# Patient Record
Sex: FEMALE | Race: ASIAN | NOT HISPANIC OR LATINO | Employment: UNEMPLOYED | URBAN - METROPOLITAN AREA
[De-identification: names, ages, dates, MRNs, and addresses within clinical notes are randomized per-mention and may not be internally consistent; named-entity substitution may affect disease eponyms.]

---

## 2020-04-01 ENCOUNTER — COMMUNICATION - HEALTHEAST (OUTPATIENT)
Dept: SCHEDULING | Facility: CLINIC | Age: 60
End: 2020-04-01

## 2021-03-12 ENCOUNTER — TRANSFERRED RECORDS (OUTPATIENT)
Dept: HEALTH INFORMATION MANAGEMENT | Facility: CLINIC | Age: 61
End: 2021-03-12

## 2021-03-18 ENCOUNTER — TRANSFERRED RECORDS (OUTPATIENT)
Dept: HEALTH INFORMATION MANAGEMENT | Facility: CLINIC | Age: 61
End: 2021-03-18

## 2021-03-20 ENCOUNTER — COMMUNICATION - HEALTHEAST (OUTPATIENT)
Dept: SCHEDULING | Facility: CLINIC | Age: 61
End: 2021-03-20

## 2021-03-23 LAB
ALT SERPL-CCNC: 24 U/L (ref 0–45)
AST SERPL-CCNC: 50 U/L (ref 0–40)

## 2021-03-25 ENCOUNTER — MEDICAL CORRESPONDENCE (OUTPATIENT)
Dept: HEALTH INFORMATION MANAGEMENT | Facility: CLINIC | Age: 61
End: 2021-03-25

## 2021-03-26 ENCOUNTER — TRANSFERRED RECORDS (OUTPATIENT)
Dept: HEALTH INFORMATION MANAGEMENT | Facility: CLINIC | Age: 61
End: 2021-03-26

## 2021-03-26 ENCOUNTER — HOSPITAL ENCOUNTER (INPATIENT)
Facility: CLINIC | Age: 61
LOS: 2 days | Discharge: ANOTHER HOSPITAL | DRG: 557 | End: 2021-03-28
Attending: PHYSICAL MEDICINE & REHABILITATION | Admitting: PHYSICAL MEDICINE & REHABILITATION
Payer: COMMERCIAL

## 2021-03-26 DIAGNOSIS — A41.9 SEPSIS, DUE TO UNSPECIFIED ORGANISM, UNSPECIFIED WHETHER ACUTE ORGAN DYSFUNCTION PRESENT (H): ICD-10-CM

## 2021-03-26 DIAGNOSIS — R53.81 DEBILITY: Primary | ICD-10-CM

## 2021-03-26 LAB
CREAT SERPL-MCNC: 3.87 MG/DL (ref 0.6–1.1)
GFR SERPL CREATININE-BSD FRML MDRD: 12 ML/MIN/1.73M2
GLUCOSE BLDC GLUCOMTR-MCNC: 144 MG/DL (ref 70–99)
GLUCOSE BLDC GLUCOMTR-MCNC: 149 MG/DL (ref 70–99)
GLUCOSE BLDC GLUCOMTR-MCNC: 155 MG/DL (ref 70–99)
GLUCOSE SERPL-MCNC: 103 MG/DL (ref 70–125)
GLUCOSE SERPL-MCNC: 170 MG/DL (ref 70–139)
POTASSIUM SERPL-SCNC: 3.6 MMOL/L (ref 3.5–5)

## 2021-03-26 PROCEDURE — 250N000013 HC RX MED GY IP 250 OP 250 PS 637: Performed by: PHYSICAL MEDICINE & REHABILITATION

## 2021-03-26 PROCEDURE — 250N000012 HC RX MED GY IP 250 OP 636 PS 637: Performed by: PHYSICIAN ASSISTANT

## 2021-03-26 PROCEDURE — 250N000009 HC RX 250: Performed by: PHYSICIAN ASSISTANT

## 2021-03-26 PROCEDURE — 128N000003 HC R&B REHAB

## 2021-03-26 PROCEDURE — 93010 ELECTROCARDIOGRAM REPORT: CPT | Performed by: INTERNAL MEDICINE

## 2021-03-26 PROCEDURE — 93005 ELECTROCARDIOGRAM TRACING: CPT

## 2021-03-26 PROCEDURE — 250N000013 HC RX MED GY IP 250 OP 250 PS 637: Performed by: PHYSICIAN ASSISTANT

## 2021-03-26 PROCEDURE — 999N001017 HC STATISTIC GLUCOSE BY METER IP

## 2021-03-26 PROCEDURE — 99223 1ST HOSP IP/OBS HIGH 75: CPT | Performed by: PHYSICAL MEDICINE & REHABILITATION

## 2021-03-26 RX ORDER — SODIUM BICARBONATE 650 MG/1
650 TABLET ORAL 2 TIMES DAILY
Status: ON HOLD | COMMUNITY
End: 2021-04-07

## 2021-03-26 RX ORDER — SENNOSIDES A AND B 8.6 MG/1
1 TABLET, FILM COATED ORAL DAILY
Status: ON HOLD | COMMUNITY
End: 2021-03-28

## 2021-03-26 RX ORDER — ACETAMINOPHEN 325 MG/1
325-650 TABLET ORAL EVERY 4 HOURS PRN
Status: ON HOLD | COMMUNITY
End: 2021-03-28

## 2021-03-26 RX ORDER — POLYETHYLENE GLYCOL 3350 17 G/17G
17 POWDER, FOR SOLUTION ORAL DAILY
Status: DISCONTINUED | OUTPATIENT
Start: 2021-03-27 | End: 2021-03-28 | Stop reason: HOSPADM

## 2021-03-26 RX ORDER — SCOLOPAMINE TRANSDERMAL SYSTEM 1 MG/1
1 PATCH, EXTENDED RELEASE TRANSDERMAL
Status: DISCONTINUED | OUTPATIENT
Start: 2021-03-26 | End: 2021-03-28

## 2021-03-26 RX ORDER — LANOLIN ALCOHOL/MO/W.PET/CERES
3 CREAM (GRAM) TOPICAL
Status: DISCONTINUED | OUTPATIENT
Start: 2021-03-26 | End: 2021-03-28 | Stop reason: HOSPADM

## 2021-03-26 RX ORDER — BISACODYL 10 MG
10 SUPPOSITORY, RECTAL RECTAL DAILY PRN
Status: DISCONTINUED | OUTPATIENT
Start: 2021-03-26 | End: 2021-03-28 | Stop reason: HOSPADM

## 2021-03-26 RX ORDER — PROCHLORPERAZINE MALEATE 5 MG
5 TABLET ORAL EVERY 8 HOURS PRN
Status: DISCONTINUED | OUTPATIENT
Start: 2021-03-26 | End: 2021-03-28 | Stop reason: HOSPADM

## 2021-03-26 RX ORDER — POLYETHYLENE GLYCOL 3350 17 G/17G
17 POWDER, FOR SOLUTION ORAL DAILY PRN
Status: DISCONTINUED | OUTPATIENT
Start: 2021-03-26 | End: 2021-03-28 | Stop reason: HOSPADM

## 2021-03-26 RX ORDER — FERROUS SULFATE 325(65) MG
325 TABLET ORAL
Status: ON HOLD | COMMUNITY
End: 2021-03-28

## 2021-03-26 RX ORDER — POLYETHYLENE GLYCOL 3350 17 G/17G
1 POWDER, FOR SOLUTION ORAL DAILY
Status: ON HOLD | COMMUNITY
End: 2021-03-28

## 2021-03-26 RX ORDER — MICONAZOLE NITRATE 20 MG/G
CREAM TOPICAL 2 TIMES DAILY
Status: ON HOLD | COMMUNITY
End: 2021-03-28

## 2021-03-26 RX ORDER — CALCIUM POLYCARBOPHIL 625 MG 625 MG/1
625 TABLET ORAL 2 TIMES DAILY
Status: DISCONTINUED | OUTPATIENT
Start: 2021-03-26 | End: 2021-03-28 | Stop reason: HOSPADM

## 2021-03-26 RX ORDER — SODIUM BICARBONATE 650 MG/1
650 TABLET ORAL 2 TIMES DAILY
Status: DISCONTINUED | OUTPATIENT
Start: 2021-03-26 | End: 2021-03-28 | Stop reason: HOSPADM

## 2021-03-26 RX ORDER — CALCIUM POLYCARBOPHIL 625 MG 625 MG/1
2 TABLET ORAL DAILY
COMMUNITY

## 2021-03-26 RX ORDER — ASPIRIN 81 MG/1
81 TABLET ORAL DAILY
Status: DISCONTINUED | OUTPATIENT
Start: 2021-03-27 | End: 2021-03-28 | Stop reason: HOSPADM

## 2021-03-26 RX ORDER — DEXTROSE MONOHYDRATE 25 G/50ML
25-50 INJECTION, SOLUTION INTRAVENOUS
Status: DISCONTINUED | OUTPATIENT
Start: 2021-03-26 | End: 2021-03-28 | Stop reason: HOSPADM

## 2021-03-26 RX ORDER — FERROUS SULFATE 325(65) MG
325 TABLET ORAL DAILY
Status: DISCONTINUED | OUTPATIENT
Start: 2021-03-27 | End: 2021-03-28

## 2021-03-26 RX ORDER — MULTIVITAMIN,THERAPEUTIC
1 TABLET ORAL DAILY
Status: ON HOLD | COMMUNITY
End: 2021-03-28

## 2021-03-26 RX ORDER — ASPIRIN 81 MG/1
81 TABLET, CHEWABLE ORAL DAILY
COMMUNITY

## 2021-03-26 RX ORDER — POTASSIUM CHLORIDE 750 MG/1
10 TABLET, EXTENDED RELEASE ORAL DAILY
Status: DISCONTINUED | OUTPATIENT
Start: 2021-03-27 | End: 2021-03-26

## 2021-03-26 RX ORDER — METOPROLOL TARTRATE 25 MG/1
12.5 TABLET, FILM COATED ORAL 2 TIMES DAILY
Status: ON HOLD | COMMUNITY
End: 2021-04-07

## 2021-03-26 RX ORDER — NICOTINE POLACRILEX 4 MG
15-30 LOZENGE BUCCAL
Status: DISCONTINUED | OUTPATIENT
Start: 2021-03-26 | End: 2021-03-28 | Stop reason: HOSPADM

## 2021-03-26 RX ORDER — VIT B COMP NO.3/FOLIC/C/BIOTIN 1 MG-60 MG
1 TABLET ORAL DAILY
Status: ON HOLD | COMMUNITY
End: 2021-04-07

## 2021-03-26 RX ORDER — OMEPRAZOLE 40 MG/1
40 CAPSULE, DELAYED RELEASE ORAL 2 TIMES DAILY
Status: ON HOLD | COMMUNITY
End: 2021-04-07

## 2021-03-26 RX ORDER — MAGNESIUM OXIDE 400 MG/1
400 TABLET ORAL 2 TIMES DAILY
Status: ON HOLD | COMMUNITY
End: 2021-04-07

## 2021-03-26 RX ORDER — VIT B COMP NO.3/FOLIC/C/BIOTIN 1 MG-60 MG
1 TABLET ORAL DAILY
Status: DISCONTINUED | OUTPATIENT
Start: 2021-03-27 | End: 2021-03-28 | Stop reason: HOSPADM

## 2021-03-26 RX ORDER — ACETAMINOPHEN 325 MG/1
650 TABLET ORAL EVERY 6 HOURS PRN
Status: DISCONTINUED | OUTPATIENT
Start: 2021-03-26 | End: 2021-03-28 | Stop reason: HOSPADM

## 2021-03-26 RX ORDER — MAGNESIUM OXIDE 400 MG/1
400 TABLET ORAL 2 TIMES DAILY
Status: DISCONTINUED | OUTPATIENT
Start: 2021-03-26 | End: 2021-03-28 | Stop reason: HOSPADM

## 2021-03-26 RX ORDER — AMOXICILLIN 250 MG
1 CAPSULE ORAL AT BEDTIME
Status: DISCONTINUED | OUTPATIENT
Start: 2021-03-26 | End: 2021-03-28 | Stop reason: HOSPADM

## 2021-03-26 RX ADMIN — SCOPALAMINE 1 PATCH: 1 PATCH, EXTENDED RELEASE TRANSDERMAL at 17:06

## 2021-03-26 RX ADMIN — CALCIUM POLYCARBOPHIL 625 MG: 625 TABLET, FILM COATED ORAL at 20:53

## 2021-03-26 RX ADMIN — SODIUM BICARBONATE 650 MG TABLET 650 MG: at 20:53

## 2021-03-26 RX ADMIN — TICAGRELOR 90 MG: 90 TABLET ORAL at 20:53

## 2021-03-26 RX ADMIN — INSULIN ASPART 1 UNITS: 100 INJECTION, SOLUTION INTRAVENOUS; SUBCUTANEOUS at 17:57

## 2021-03-26 RX ADMIN — MAGNESIUM OXIDE TAB 400 MG (241.3 MG ELEMENTAL MG) 400 MG: 400 (241.3 MG) TAB at 20:53

## 2021-03-26 RX ADMIN — OMEPRAZOLE 20 MG: 20 CAPSULE, DELAYED RELEASE ORAL at 16:17

## 2021-03-26 RX ADMIN — Medication 12.5 MG: at 20:53

## 2021-03-26 ASSESSMENT — MIFFLIN-ST. JEOR: SCORE: 1025.38

## 2021-03-26 NOTE — H&P
[facility]   Acute Rehabilitation Unit  Admission History and Physical    CHIEF COMPLAINT   Generalized weakness.    HISTORY OF PRESENT ILLNESS  Breana Boudreaux is a 61 year old Hmong speaking woman with past medical history including but not limited to: diabetes mellitus type II, chronic kidney disease stage III, RLS, and coronary artery disease with PCI to LAD 1/26/21 and presented to ED 2/1 with NSTEMI s/p coronary angiogram with 3 stents placed on 2/3/21. She was discharged home  2/4/21 on aspirin, brilinta and crestor 40 mg daily.      Patient presented to [facility] on 2/21/21 with dyspnea. Workup revealed acute kidney injury, lactic acidosis, transaminitis and rhabdomyolysis. There was initial concern for septic shock and patient was initially on empiric antibiotics but this was ruled out and antibiotics were stopped. Patient's acute kidney injury felt to be secondary to rhabdomyolysis that appeared to have been caused by statin. Patient was emergently started on hemodialysis for acute kidney injury and severe hyperkalemia. Patient remained on  hemodialysis during hospital stay. Patient's lactic acidosis felt to be secondary to acute kidney injury and metformin.     Patient also was found to have gastrointestinal bleeding. EGD 2/22/21 was unremarkable but patient did have blood and clot seen on flexible sigmoidoscopy 2/22/21, underwent tagged RBC scan however,  no site of bleeding was identified. May received PRBC and   platelets for thrombocytopenia; felt to be secondary to  critical illness, antibiotics, and improved.  Patient was transferred to LTAC on 3/12/21     During LTAC stay patient  completed a course of fluconazole for fungal urinary tract infection. Patient continued to have nausea and vomiting felt to be multifactorial. She last underwent dialysis 3/13/21.  Creatinine remains elevated but is slowly falling. Rhabdomyolysis has resolved. Blood  pressure medications were titrated due to hypotension.  Both magnesium and potassium were replaced, with recommendation for follow up BMP and mag levels on 3/29/21.     During acute hospitalization, patient was seen and evaluated by PT and OT,  who collectively recommended that patient would benefit from ongoing therapies in the acute inpatient rehabilitation setting.  Noted to have impaired strength, impaired balance, and impaired activity tolerance.   In review of the therapy notes she is currently set up assist with grooming, mod-max assist with dressing, cga-min assist with transfers, ambulating up to 40 feet with cga.       She was seen resting in bed visit completed with [facility] [place] .  Reports generalized weakness, and ongoing nausea/ vomiting.  Reports this is ongoing since this hospitalization, felt to be related to renal failure though this is improved she feels nausea/vomiting has not improved.  No ab pain, no fevers, no dysuria, no sob, also denies dizziness.  She is hopeful intake and appetite will improve is motivated to improve independence and return home.        PAST MEDICAL HISTORY   Reviewed and updated in Epic.  No past medical history on file.  NSTEMI  CAD  Chronic back pain  RLS- previously on requip  DM type 2  HTN.    SURGICAL HISTORY  Reviewed and updated in Epic.  No past surgical history on file.    SOCIAL HISTORY  Reviewed and updated in Epic.  Marital Status: marred  Living situation: lives with spouse 1 cathy  Family support: supportive spouse and children  Vocational History: not working  Tobacco use: none  Alcohol use: none  Illicit drug use: none  Social History     Socioeconomic History     Marital status: Not on file     Spouse name: Not on file     Number of children: Not on file     Years of education: Not on file     Highest education level: Not on file   Occupational History     Not on file   Social Needs     Financial resource strain: Not on file     Food insecurity      "Worry: Not on file     Inability: Not on file     Transportation needs     Medical: Not on file     Non-medical: Not on file   Tobacco Use     Smoking status: Not on file   Substance and Sexual Activity     Alcohol use: Not on file     Drug use: Not on file     Sexual activity: Not on file   Lifestyle     Physical activity     Days per week: Not on file     Minutes per session: Not on file     Stress: Not on file   Relationships     Social connections     Talks on phone: Not on file     Gets together: Not on file     Attends Scientologist service: Not on file     Active member of club or organization: Not on file     Attends meetings of clubs or organizations: Not on file     Relationship status: Not on file     Intimate partner violence     Fear of current or ex partner: Not on file     Emotionally abused: Not on file     Physically abused: Not on file     Forced sexual activity: Not on file   Other Topics Concern     Not on file   Social History Narrative     Not on file       FAMILY HISTORY  Reviewed and updated in Epic.  No family history on file.      PRIOR FUNCTIONAL HISTORY   Pt was independent with all ADLs/IADLs, transfers, mobility and gait.      MEDICATIONS  Scheduled meds  No medications prior to admission.       ALLERGIES   Not on File      REVIEW OF SYSTEMS  A 10 point ROS was performed and negative unless otherwise noted in HPI.       PHYSICAL EXAM  VITAL SIGNS:  BP 98/62   Pulse 81   Temp 96.5  F (35.8  C) (Oral)   Resp 18   Ht 1.499 m (4' 11\")   Wt 55.5 kg (122 lb 4.8 oz)   SpO2 98%   BMI 24.70 kg/m    BMI:  There is no height or weight on file to calculate BMI.     General: awake alert   HEENT: mmm eomi  Pulmonary: non labored clear on room air  Cardiovascular: rrr  Abdominal: soft non tender non distended positive bowel sounds   Extremities: warm, well perfused, no edema in bilateral lower extremities, no tenderness in calves   MSK/neuro:   Mental Status:  alert and oriented   Cranial Nerves: " grossly normal    Sensory: Normal to light touch in bilateral upper and lower extremities    Strength: 4/5 in all muscle groups of the upper and lower extremities  Weakest proximally hf 4-/5   Reflexes: Present and symmetrical    Babinski reflex: downgoing bilaterally    Abnormal movements: None   Speech:per - clear coherent   Cognition:overall linear logical- some repetition needed for commands/ clarification   Gait: not tested  Skin: visualized skin intact.       LABS              Hgb A1C 9.2 2/21.     IMPRESSION/PLAN:  Breana Boudreaux is a 61 year old woman with CAD s/p recent stenting 2/3/21, DM type 2, HTN, RLS, who presented 2/21/21 with shortness of breath found to be in SHYANNE with lactic acidosis, rhabdomyolysis, with other electrolyte abnormalities was started on dialysis, hospital stay complicated by GI bleed, UTI, thrombocytopenia,  discharged to LTAC 3/12 last dialysis 3/13. Admitted to acute rehab 3/26/21.       Admission to acute inpatient rehab rhabdomyolysis  Impairment group code: 03.8      1. PT, OT 90 minutes of each on a daily basis, in addition to rehab nursing and close management of physiatrist.      2. Impairment of ADL's: Noted to have impaired strength, impaired activity tolerance, leading to decreased ability to independently complete ADL's.  Will benefit from ongoing OT with goal for MOD I with basic ADLs.     3. Impairment of mobility:  Noted to have impaired strength, impaired activity tolerance, and impaired balance leading to decreased mobility.  Will benefit from ongoing PT with goal for CHERYL with basic mobility.       4. Medical Conditions  Acute Kidney Injury  Lactic acidosis-   Rhabdomyolysis  Presented 2/23/21 with SHYANNE, lactic acidosis, rhabdo CK 80,000 requiring HD felt to be secondary to statin. Last HD 3/13  -trend weights, intake/output  -trend BMP 3/27, 3/29- restart potassium supplementation as indicated this was discontinued per LTAC provider.   -continue to  hold statin  -continue to hold metformin  -started on sodium bicarb 650 bid 3/26 per discharging facility    CAD- s/p PCI 2/3/21 on brilinta and asa.  -continue dual antiplatelet therapy  -metoprolol 12.5 mg bid dose reduced 3/26    HTN- with hypotension during LTAC stay with metoprolol dose reduced 3/26.   -monitor BP  -continue metoprolol 12.5 mg bid    Nausea/vomiting- reports ongoing since hospitalization 2/21 told was in setting of renal failure though has not improved per her report. Last QTc 3/16 486.    -ppi as above  -encourage intake  -prn compazine    QTc- mildly prolonged 486 3/16  -routine EKG eval Qtc-     Hypomagnesemia- continue mag oxide  -repeat Mag level 3/29    Recent GI bleed- with edg and flex sig 2/22/21 with resolution.   -monitor  -continue ppi bid    Anemia- in setting of critical illness, renal failure.  Hgb 8.4 3/25  -continue iron supplement daily  -trend hgb    Leucocytosis-  WBC 12.2 3/23  -trend 3/29  -monitor for signs/symptoms of infection    DM type 2- Hgb A1c 2/21/21 9.2% previously on metformin, lantus, metformin held in setting of recent lactic acidosis. bg overall stable on ssi, though intake reported as poor due to nausea/ vomiting  -check bg qid  -ssi- will taper off or add long acting pending bg levels during rehab stay.       5. Adjustment to disability:  Clinical psychology to eval and treat as indicated  6. FEN: regular- cardiac- though appetite poor and n/v  7. Bowel: reports loose- monitor hold bowel meds  8. Bladder: monitor as recovering renal failure  9. DVT Prophylaxis: mechanical  10. GI Prophylaxis: ppi bid  11. Code: full  12. Disposition: home  13. ELOS:  ~10 days.  14. Rehab prognosis:  good  15. Follow up Appointments on Discharge: pcp, cardiology,      discussed with Dr. Nieves , PM&R staff physician     Ivett Constantino PA-C  Rehab Service

## 2021-03-26 NOTE — PLAN OF CARE
FOCUS/GOAL  Medical management    ASSESSMENT, INTERVENTIONS AND CONTINUING PLAN FOR GOAL:  Patient arrived around 13:00 with EMTs. She transferred from  to bed  with CGA of 1 using gait belt only per report.  Patient is a Hmong speaker,  services are not available on the Ipad. Need to call them via phone. Per language services.  Patient  will  Need help to order meals. Today's lunch and dinner and tomorrow breakfast ordered. ordered.  Diabetic, BG was 149.  sliding scale not received, insulin not available and  Patient ate. Vitals taken, BP was 98/52. Skin is intact, blanchable redness noted on coccyx area.T/R every 2h, patient is able to independently change position ( reminder may be needed).  Picc line double lumen on R upper chest. Room orientation done, patient stated her goals. Reported  LBM 3/26.   Patient c/o of nausea at arrival, admitting MD informed. Will continue with POC.

## 2021-03-27 ENCOUNTER — APPOINTMENT (OUTPATIENT)
Dept: GENERAL RADIOLOGY | Facility: CLINIC | Age: 61
DRG: 557 | End: 2021-03-27
Attending: PHYSICAL MEDICINE & REHABILITATION
Payer: COMMERCIAL

## 2021-03-27 LAB
ANION GAP SERPL CALCULATED.3IONS-SCNC: 11 MMOL/L (ref 3–14)
BASOPHILS # BLD AUTO: 0.1 10E9/L (ref 0–0.2)
BASOPHILS NFR BLD AUTO: 0.9 %
BUN SERPL-MCNC: 28 MG/DL (ref 7–30)
CALCIUM SERPL-MCNC: 8.2 MG/DL (ref 8.5–10.1)
CHLORIDE SERPL-SCNC: 107 MMOL/L (ref 94–109)
CO2 SERPL-SCNC: 21 MMOL/L (ref 20–32)
CREAT SERPL-MCNC: 3.67 MG/DL (ref 0.52–1.04)
DIFFERENTIAL METHOD BLD: ABNORMAL
EOSINOPHIL # BLD AUTO: 0.4 10E9/L (ref 0–0.7)
EOSINOPHIL NFR BLD AUTO: 4.2 %
ERYTHROCYTE [DISTWIDTH] IN BLOOD BY AUTOMATED COUNT: 19.5 % (ref 10–15)
GFR SERPL CREATININE-BSD FRML MDRD: 13 ML/MIN/{1.73_M2}
GLUCOSE BLDC GLUCOMTR-MCNC: 103 MG/DL (ref 70–99)
GLUCOSE BLDC GLUCOMTR-MCNC: 126 MG/DL (ref 70–99)
GLUCOSE BLDC GLUCOMTR-MCNC: 146 MG/DL (ref 70–99)
GLUCOSE BLDC GLUCOMTR-MCNC: 149 MG/DL (ref 70–99)
GLUCOSE SERPL-MCNC: 108 MG/DL (ref 70–99)
HCT VFR BLD AUTO: 30.5 % (ref 35–47)
HGB BLD-MCNC: 9.6 G/DL (ref 11.7–15.7)
IMM GRANULOCYTES # BLD: 0 10E9/L (ref 0–0.4)
IMM GRANULOCYTES NFR BLD: 0.3 %
LYMPHOCYTES # BLD AUTO: 3.8 10E9/L (ref 0.8–5.3)
LYMPHOCYTES NFR BLD AUTO: 37.2 %
MAGNESIUM SERPL-MCNC: 2 MG/DL (ref 1.6–2.3)
MCH RBC QN AUTO: 30.8 PG (ref 26.5–33)
MCHC RBC AUTO-ENTMCNC: 31.5 G/DL (ref 31.5–36.5)
MCV RBC AUTO: 98 FL (ref 78–100)
MONOCYTES # BLD AUTO: 0.9 10E9/L (ref 0–1.3)
MONOCYTES NFR BLD AUTO: 8.9 %
NEUTROPHILS # BLD AUTO: 5 10E9/L (ref 1.6–8.3)
NEUTROPHILS NFR BLD AUTO: 48.5 %
NRBC # BLD AUTO: 0 10*3/UL
NRBC BLD AUTO-RTO: 0 /100
PLATELET # BLD AUTO: 382 10E9/L (ref 150–450)
POTASSIUM SERPL-SCNC: 3.6 MMOL/L (ref 3.4–5.3)
RBC # BLD AUTO: 3.12 10E12/L (ref 3.8–5.2)
SODIUM SERPL-SCNC: 139 MMOL/L (ref 133–144)
WBC # BLD AUTO: 10.2 10E9/L (ref 4–11)

## 2021-03-27 PROCEDURE — 97116 GAIT TRAINING THERAPY: CPT | Mod: GP

## 2021-03-27 PROCEDURE — 97165 OT EVAL LOW COMPLEX 30 MIN: CPT | Mod: GO | Performed by: STUDENT IN AN ORGANIZED HEALTH CARE EDUCATION/TRAINING PROGRAM

## 2021-03-27 PROCEDURE — 250N000013 HC RX MED GY IP 250 OP 250 PS 637: Performed by: PHYSICAL MEDICINE & REHABILITATION

## 2021-03-27 PROCEDURE — 85025 COMPLETE CBC W/AUTO DIFF WBC: CPT | Performed by: PHYSICIAN ASSISTANT

## 2021-03-27 PROCEDURE — 97162 PT EVAL MOD COMPLEX 30 MIN: CPT | Mod: GP

## 2021-03-27 PROCEDURE — 36415 COLL VENOUS BLD VENIPUNCTURE: CPT | Performed by: PHYSICIAN ASSISTANT

## 2021-03-27 PROCEDURE — 97535 SELF CARE MNGMENT TRAINING: CPT | Mod: GO | Performed by: STUDENT IN AN ORGANIZED HEALTH CARE EDUCATION/TRAINING PROGRAM

## 2021-03-27 PROCEDURE — 94640 AIRWAY INHALATION TREATMENT: CPT

## 2021-03-27 PROCEDURE — 83735 ASSAY OF MAGNESIUM: CPT | Performed by: PHYSICIAN ASSISTANT

## 2021-03-27 PROCEDURE — 999N000157 HC STATISTIC RCP TIME EA 10 MIN

## 2021-03-27 PROCEDURE — 71046 X-RAY EXAM CHEST 2 VIEWS: CPT | Mod: 26 | Performed by: RADIOLOGY

## 2021-03-27 PROCEDURE — 97530 THERAPEUTIC ACTIVITIES: CPT | Mod: GP

## 2021-03-27 PROCEDURE — 250N000011 HC RX IP 250 OP 636: Performed by: STUDENT IN AN ORGANIZED HEALTH CARE EDUCATION/TRAINING PROGRAM

## 2021-03-27 PROCEDURE — 999N001017 HC STATISTIC GLUCOSE BY METER IP

## 2021-03-27 PROCEDURE — 71046 X-RAY EXAM CHEST 2 VIEWS: CPT

## 2021-03-27 PROCEDURE — 250N000009 HC RX 250: Performed by: PHYSICAL MEDICINE & REHABILITATION

## 2021-03-27 PROCEDURE — 250N000013 HC RX MED GY IP 250 OP 250 PS 637: Performed by: PHYSICIAN ASSISTANT

## 2021-03-27 PROCEDURE — 80048 BASIC METABOLIC PNL TOTAL CA: CPT | Performed by: PHYSICIAN ASSISTANT

## 2021-03-27 PROCEDURE — 99233 SBSQ HOSP IP/OBS HIGH 50: CPT | Mod: GC | Performed by: PHYSICAL MEDICINE & REHABILITATION

## 2021-03-27 PROCEDURE — 128N000003 HC R&B REHAB

## 2021-03-27 PROCEDURE — 97530 THERAPEUTIC ACTIVITIES: CPT | Mod: GO | Performed by: STUDENT IN AN ORGANIZED HEALTH CARE EDUCATION/TRAINING PROGRAM

## 2021-03-27 RX ORDER — HEPARIN SODIUM,PORCINE 10 UNIT/ML
5-10 VIAL (ML) INTRAVENOUS
Status: DISCONTINUED | OUTPATIENT
Start: 2021-03-27 | End: 2021-03-28 | Stop reason: HOSPADM

## 2021-03-27 RX ORDER — HEPARIN SODIUM,PORCINE 10 UNIT/ML
5-10 VIAL (ML) INTRAVENOUS EVERY 24 HOURS
Status: DISCONTINUED | OUTPATIENT
Start: 2021-03-27 | End: 2021-03-28 | Stop reason: HOSPADM

## 2021-03-27 RX ORDER — IPRATROPIUM BROMIDE AND ALBUTEROL SULFATE 2.5; .5 MG/3ML; MG/3ML
3 SOLUTION RESPIRATORY (INHALATION) 2 TIMES DAILY
Status: DISCONTINUED | OUTPATIENT
Start: 2021-03-27 | End: 2021-03-28

## 2021-03-27 RX ADMIN — Medication 12.5 MG: at 21:39

## 2021-03-27 RX ADMIN — TICAGRELOR 90 MG: 90 TABLET ORAL at 08:20

## 2021-03-27 RX ADMIN — PROCHLORPERAZINE MALEATE 5 MG: 5 TABLET ORAL at 15:31

## 2021-03-27 RX ADMIN — IPRATROPIUM BROMIDE AND ALBUTEROL SULFATE 3 ML: .5; 3 SOLUTION RESPIRATORY (INHALATION) at 12:53

## 2021-03-27 RX ADMIN — POLYETHYLENE GLYCOL 3350 17 G: 17 POWDER, FOR SOLUTION ORAL at 08:20

## 2021-03-27 RX ADMIN — HEPARIN, PORCINE (PF) 10 UNIT/ML INTRAVENOUS SYRINGE 10 ML: at 15:32

## 2021-03-27 RX ADMIN — TICAGRELOR 90 MG: 90 TABLET ORAL at 21:38

## 2021-03-27 RX ADMIN — Medication 1 TABLET: at 08:20

## 2021-03-27 RX ADMIN — OMEPRAZOLE 20 MG: 20 CAPSULE, DELAYED RELEASE ORAL at 06:35

## 2021-03-27 RX ADMIN — CALCIUM POLYCARBOPHIL 625 MG: 625 TABLET, FILM COATED ORAL at 08:19

## 2021-03-27 RX ADMIN — Medication 12.5 MG: at 08:19

## 2021-03-27 RX ADMIN — OMEPRAZOLE 20 MG: 20 CAPSULE, DELAYED RELEASE ORAL at 16:34

## 2021-03-27 RX ADMIN — MAGNESIUM OXIDE TAB 400 MG (241.3 MG ELEMENTAL MG) 400 MG: 400 (241.3 MG) TAB at 21:39

## 2021-03-27 RX ADMIN — DOCUSATE SODIUM 50 MG AND SENNOSIDES 8.6 MG 1 TABLET: 8.6; 5 TABLET, FILM COATED ORAL at 21:38

## 2021-03-27 RX ADMIN — CALCIUM POLYCARBOPHIL 625 MG: 625 TABLET, FILM COATED ORAL at 21:39

## 2021-03-27 RX ADMIN — SODIUM BICARBONATE 650 MG TABLET 650 MG: at 21:39

## 2021-03-27 RX ADMIN — ASPIRIN 81 MG: 81 TABLET, DELAYED RELEASE ORAL at 08:20

## 2021-03-27 RX ADMIN — SODIUM BICARBONATE 650 MG TABLET 650 MG: at 08:20

## 2021-03-27 RX ADMIN — MAGNESIUM OXIDE TAB 400 MG (241.3 MG ELEMENTAL MG) 400 MG: 400 (241.3 MG) TAB at 08:19

## 2021-03-27 RX ADMIN — FERROUS SULFATE TAB 325 MG (65 MG ELEMENTAL FE) 325 MG: 325 (65 FE) TAB at 08:20

## 2021-03-27 ASSESSMENT — MIFFLIN-ST. JEOR: SCORE: 1017.21

## 2021-03-27 NOTE — PLAN OF CARE
FOCUS/GOAL  Medical management    ASSESSMENT, INTERVENTIONS AND CONTINUING PLAN FOR GOAL:    Pt is alert and oriented. Hmong speaking but able to understand and speak simple english. Denied pain, sob and numbness/tingling. A1 with the walker and gait belt in transfers. Min A1 in bed mobility in turning side to side. Continent of bladder. PVR at 34ml. Had an inc. episode of bowel in the evening shift. LBM 3/26. Ambulates to the bathroom for toileting. Has a PICC line on the right upper chest with Cdi dresisng. Did not remove PICC line for pt has abnormal lab results. Will await further instructions. Appears sleeping during rounds.

## 2021-03-27 NOTE — PLAN OF CARE
FOCUS/GOAL  Medical management    ASSESSMENT, INTERVENTIONS AND CONTINUING PLAN FOR GOAL:  VSS. Patient c/o of SOB and cough. Vitals were still within normal range. Lungs sound clear, weight slightly down. Provider updated. New order received. CXR, labs and nebulizer done.  Received 2L of O2 for few hours per her request and doctor's verbal orders for comfort. ( O2 sat was 99%). Declined lunch, BG was 146 sliding scale not administered.  Patient stated feeling better after neb administration.  Denied pain.  Orthostatic BP done, Provider informed in person. No new order received. Received compazine at the end ;of the shift per her request,result TBD. Currently on 2L o2 per her request via NC, Provider informed. Will continue to monitor.

## 2021-03-27 NOTE — PROGRESS NOTES
03/27/21 1000   Quick Adds   Type of Visit Initial PT Evaluation   Living Environment   People in home spouse;child(daron), adult   Current Living Arrangements house   Living Environment Comments [address], one level living. Son works from home-available 24/7   Self-Care   Usual Activity Tolerance good   Current Activity Tolerance fair   Regular Exercise Yes   Activity/Exercise Type strength training;walking   Exercise Amount/Frequency 3-5 times/wk   Activity/Exercise/Self-Care Comment PLOF: I with functional mobility; SPC.    Disability/Function   Patient's preferred means of communication ;verbal   Fall history within last six months no   General Information   Onset of Illness/Injury or Date of Surgery 02/21/21   Referring Physician Shelia Nieves MD   Patient/Family Therapy Goals Statement (PT) To go home   Pertinent History of Current Problem (include personal factors and/or comorbidities that impact the POC) NSTEMI 2/1; now s/p angio with 3 stents placed and pt Dc'd home, presented back 2/21 with dyspnea; Workup revealed acute kidney injury, lactic acidosis, transaminitis and rhabdomyolysis-appeared to be caused by statin; complicated hospital stay, see H&P; LTACH stay prior to admit here.    Existing Precautions/Restrictions fall   Cognition   Orientation Status (Cognition) oriented x 4   Pain Assessment   Patient Currently in Pain No   Posture    Posture Forward head position;Protracted shoulders   Range of Motion (ROM)   ROM Comment B LE ROM WFL   Strength   Strength Comments B LE strength at least 3+/5 based on observation of functional movement, Definite functional weakness and premature fatigue.    ARC Assessment Only   Acute Rehab Functional Assessment See IP Rehab Daily Documentation Flowsheet for Functional Mobility/ADL Assessment   Balance   Balance Comments Not formally assessed, increased postural sway   Sensory Examination   Sensory Perception patient reports no sensory changes    Muscle Tone   Muscle Tone no deficits were identified   Clinical Impression   Criteria for Skilled Therapeutic Intervention yes, treatment indicated   PT Diagnosis (PT) Decreased I with funcitonal mobility   Influenced by the following impairments Decreased strength, balance and activity tolerance, possible motion sensitivity and nausea   Functional limitations due to impairments difficulty with bed mobility, transfers and gait.    Clinical Presentation Evolving/Changing   Clinical Presentation Rationale strength, balance, nausea; complicated hospital stay   Clinical Decision Making (Complexity) moderate complexity   Therapy Frequency (PT) Daily   Predicted Duration of Therapy Intervention (days/wks) 2 weeks   Planned Therapy Interventions (PT) balance training;bed mobility training;gait training;home exercise program;motor coordination training;neuromuscular re-education;patient/family education;ROM (range of motion);stair training;strengthening;transfer training;stretching   Clinical Impression Comments PT is a 61 year old female who presents to PT with decreased I with functional mobility after a prolong hospitalization related to SHYANNE and rhabdo after recent NSTEMI. Prior to admit pt was I with intermittent use of SPC. Pt now requires up to modA for functional mobility. Pt will benefit from skilled PT intervention in order to improve listed deficits.    Total Evaluation Time   Total Evaluation Time (Minutes) 20

## 2021-03-27 NOTE — PROGRESS NOTES
"  [facility]   Acute Rehabilitation Unit  Daily progress note    INTERVAL HISTORY  Breana Boudreaux was seen and examined at bedside with help of . Patient reporting symptoms of SOB especially with deep inspiration. Denies CP. No fever. No abdominal pain. Vitals stable. LBM 3/26. PVRs under 100 x2. Curbsided IM about SOB symptoms. Patient does not appear to be in distress. CXR obtained (no baldemar pulmonary edema, but re-demonstrated cardiomegaly as reported on previous CXR 3/3). Ordered DuoNebs. Labs acutally improving in terms of Cr, Hgb, and WBC. Appreciate SLP assistance with swallow eval/upper airway assessment.    Current Status:  ADLs: MAX A for LB dressing, mod A for toileting, Set up for UB cares seated. CGA for transfers using walker  IADLs:not tested  Vision/Cognition: WFL  Pt now requires up to modA for functional mobility.     MEDICATIONS  Scheduled meds    aspirin  81 mg Oral Daily     calcium polycarbophil  625 mg Oral BID     ferrous sulfate  325 mg Oral Daily     heparin lock flush  5-10 mL Intracatheter Q24H     insulin aspart  1-7 Units Subcutaneous TID AC     insulin aspart  1-5 Units Subcutaneous At Bedtime     ipratropium - albuterol 0.5 mg/2.5 mg/3 mL  3 mL Nebulization BID     magnesium oxide  400 mg Oral BID     metoprolol tartrate  12.5 mg Oral BID     multivitamin RENAL  1 tablet Oral Daily     omeprazole  20 mg Oral BID AC     polyethylene glycol  17 g Oral Daily     scopolamine  1 patch Transdermal Q72H    And     scopolamine   Transdermal Q8H     senna-docusate  1 tablet Oral At Bedtime     sodium bicarbonate  650 mg Oral BID     ticagrelor  90 mg Oral BID       PRN meds:  acetaminophen, bisacodyl, glucose **OR** dextrose **OR** glucagon, heparin lock flush, melatonin, polyethylene glycol, prochlorperazine, sodium chloride (PF)      PHYSICAL EXAM  /79   Pulse 106   Temp 97.1  F (36.2  C) (Oral)   Resp 18   Ht 1.499 m (4' 11\")   " Wt 54.7 kg (120 lb 8 oz)   SpO2 99%   BMI 24.34 kg/m    Gen: NAD, pleasant and cooperative   HEENT: NCAT, EOMI, no nystagmus, AURORA  Cardio: 2+ radail pulse, well perfused  Pulm: Dry cough, lungs clear to auscultation. Saturating well on RA.  Abd: benign  Ext: WWP, no edema in BLE, no tenderness in calves  Neuro/MSK: 4/5 in c5-t1 and l2-s1 myotomes, CN 2-12 intact, AAOx3.      LABS  Recent Results (from the past 240 hour(s))   COVID-19 VIRUS (CORONAVIRUS) BY PCR - EXTERNAL RESULT    Collection Time: 03/19/21  6:43 PM   Result Value Ref Range    COVID-19 Virus by PCR (External Result) Negative Negative, Invalid   Glucose by meter    Collection Time: 03/26/21  1:13 PM   Result Value Ref Range    Glucose 149 (H) 70 - 99 mg/dL   EKG 12-lead, complete    Collection Time: 03/26/21  3:37 PM   Result Value Ref Range    Interpretation ECG Click View Image link to view waveform and result    Glucose by meter    Collection Time: 03/26/21  4:50 PM   Result Value Ref Range    Glucose 144 (H) 70 - 99 mg/dL   Glucose by meter    Collection Time: 03/26/21  8:50 PM   Result Value Ref Range    Glucose 155 (H) 70 - 99 mg/dL   Basic metabolic panel    Collection Time: 03/27/21  5:43 AM   Result Value Ref Range    Sodium 139 133 - 144 mmol/L    Potassium 3.6 3.4 - 5.3 mmol/L    Chloride 107 94 - 109 mmol/L    Carbon Dioxide 21 20 - 32 mmol/L    Anion Gap 11 3 - 14 mmol/L    Glucose 108 (H) 70 - 99 mg/dL    Urea Nitrogen 28 7 - 30 mg/dL    Creatinine 3.67 (H) 0.52 - 1.04 mg/dL    GFR Estimate 13 (L) >60 mL/min/[1.73_m2]    GFR Estimate If Black 15 (L) >60 mL/min/[1.73_m2]    Calcium 8.2 (L) 8.5 - 10.1 mg/dL   Magnesium    Collection Time: 03/27/21  5:43 AM   Result Value Ref Range    Magnesium 2.0 1.6 - 2.3 mg/dL   CBC with platelets differential    Collection Time: 03/27/21  5:43 AM   Result Value Ref Range    WBC 10.2 4.0 - 11.0 10e9/L    RBC Count 3.12 (L) 3.8 - 5.2 10e12/L    Hemoglobin 9.6 (L) 11.7 - 15.7 g/dL    Hematocrit 30.5  (L) 35.0 - 47.0 %    MCV 98 78 - 100 fl    MCH 30.8 26.5 - 33.0 pg    MCHC 31.5 31.5 - 36.5 g/dL    RDW 19.5 (H) 10.0 - 15.0 %    Platelet Count 382 150 - 450 10e9/L    Diff Method Automated Method     % Neutrophils 48.5 %    % Lymphocytes 37.2 %    % Monocytes 8.9 %    % Eosinophils 4.2 %    % Basophils 0.9 %    % Immature Granulocytes 0.3 %    Nucleated RBCs 0 0 /100    Absolute Neutrophil 5.0 1.6 - 8.3 10e9/L    Absolute Lymphocytes 3.8 0.8 - 5.3 10e9/L    Absolute Monocytes 0.9 0.0 - 1.3 10e9/L    Absolute Eosinophils 0.4 0.0 - 0.7 10e9/L    Absolute Basophils 0.1 0.0 - 0.2 10e9/L    Abs Immature Granulocytes 0.0 0 - 0.4 10e9/L    Absolute Nucleated RBC 0.0    Glucose by meter    Collection Time: 03/27/21  7:29 AM   Result Value Ref Range    Glucose 103 (H) 70 - 99 mg/dL   Glucose by meter    Collection Time: 03/27/21 12:35 PM   Result Value Ref Range    Glucose 146 (H) 70 - 99 mg/dL   Glucose by meter    Collection Time: 03/27/21  4:57 PM   Result Value Ref Range    Glucose 149 (H) 70 - 99 mg/dL   Glucose by meter    Collection Time: 03/27/21  9:29 PM   Result Value Ref Range    Glucose 126 (H) 70 - 99 mg/dL       CXR 3/27/2021  Findings:   Right IJ central venous catheter tip in the high right atrium. Heart  enlarged. Pulmonary vasculature not engorged. Minimal bibasilar  streaky opacity suggesting atelectasis.                                                                 Impression: Cardiomegaly without overt edema. Minimal subsegmental  atelectasis both lower lobes.    CXR 3/3/2021  IMPRESSION: Right dual-lumen central venous catheter and right IJ central venous catheter tip near the atrial caval junction. Cardiac enlargement again seen. Pulmonary vascularity within normal limits. Lungs clear. No change.    ASSESSMENT AND PLAN  Breana Boudreaux is a 61 year old woman with CAD s/p recent stenting 2/3/21, DM type 2, HTN, RLS, who presented 2/21/21 with shortness of breath found to be in SHYANNE with lactic  acidosis, rhabdomyolysis, with other electrolyte abnormalities was started on dialysis, hospital stay complicated by GI bleed, UTI, thrombocytopenia,  discharged to LTAC 3/12 last dialysis 3/13. Admitted to acute rehab 3/26/21.         Admission to acute inpatient rehab rhabdomyolysis  Impairment group code: 03.8       --Vitals stable. O2 sats good on RA. Unclear etiology of SOB. CXR unrevealing except for atelectasis. IS ordered. Ordered orthostatics (negative). Lungs sound clear. Afebrile.  --Weight is down today, so in light of all other findings, will hold off on diuresis consideration. But given SHYANNE history will monitor fluid status and symptoms. May formally involve IM if breathing difficulty persists.  --DuoNeb ordered.  --SLP to evaluate swallow.  --Labs improving (Cr down, WBC down, Hgb up). Mag and K wnl.  --No nausea reported this AM.  --Continue ongoing medical management.  --Continue therapies and plan of care.    Patient seen and discussed with Dr. oMntoya, staff physician.  Silvio Gonzalez MD  PGY-4, PM&R  [facility]        I, Joslyn Montoya, saw this patient with my resident Dr. Gonzalez and agree with his findings and plan of care as documented in his note.     I personally reviewed the chart (vitals signs, medications, and labs).     My key findings and negron manegement decisions made by me:   Saw her two times with the complaint of SOB and cough. Reviewed the chart carefully and discussed her case with Dr. Khanna. Work-up has been unremarkable and the etiology is unclear. Likely upper airway vs volume overload due to CKD. Will monitor and consult hospitalist team if her symptoms remain unchanged by tomorrow.     Eval completed and ELOS is 10-14 days.       I spent a total of 35 minutes face-to-face and managing the care of the patient. Over 50% of my time on the unit was spent counseling the patient and coordinating care. See note for details.

## 2021-03-27 NOTE — PLAN OF CARE
FOCUS/GOAL  Bowel management, Bladder management, and Safety management    ASSESSMENT, INTERVENTIONS AND CONTINUING PLAN FOR GOAL:  Pt was A/O, able to use call light and make needs known to staff, needs an  at times, speaks basic English. Denies pain, SOB, chest pain nor dizziness. A1 walker with transfers. On regular, low fat diet, thin liquids, takes medicines whole. Cont of BL, incontinent of BM x 1, LBM-3/26/21, PVR-74. PICC line in place, dressing CDI. BGs monitored, VSS. Will continue with current POC.

## 2021-03-27 NOTE — PROGRESS NOTES
"   21 1024   Living Arrangements   People in home child(daron), adult;spouse   CM/SW Discharge Planning   Anticipated Discharge Disposition (CM/SW) Home;Home Care   Final Resources   Equipment Currently Used at Home grab bar, tub/shower;shower chair    Social Work: Initial Assessment with Discharge Plan    Patient Name: Breana Mota \"May\" Janusz  : 1960  Age: 61 year old  MRN: [id number]  Completed assessment with: patient,   Admitted to ARU: 3/26/21    Presenting Information   Date of SW assessment: 3/27/21  Health Care Directive: none  Primary Health Care Agent: next-of-kin would be surrogate decision-maker if necessary  Secondary Health Care Agent: n/a  Living Situation: lives with  and 2 adult sons (Amos & Chris) in house  Previous Functional Status: independent prior to surgery/hospitalization in January   Patient and family understanding of hospitalization: yes  Cultural/Language/Spiritual Considerations: Hmong-speaking (), from [facility]  Reason for admission: 61-year-old Hmong speaking woman who presents to the acute inpatient rehabilitation unit for muscle weakness due to rhabdomyolysis and SHYANNE due to statins.  She has a history of NSTEMI in January with stent placements at which time she was placed on the statin medication.  Hospital course has been complicated by GI bleeding without an acute source identified, thrombocytopenia, and the need for temporary dialysis due to her SHYANNE.  She was discharged to an LTACH on 3/12/2021 and now subsequently transferred to the acute inpatient rehabilitation unit.      Provider Information   Primary Care Physician: Edwardo Garcia    Mental Health/Chemical Dependency:   Diagnosis: no diagnosis  Alcohol/Tobacco/Narcotics: no problems  Support/Services in Place: none  Services Needed/Recommended: health psychologist available if interested during stay  Sexuality/Intimacy: hnot discussed; history of conflict in marriage " ( sees other women)    Support System  Marital Status: ; -Briana (works full-time)  Family support: son-Chris (works full-time)  Son-Amos (works from home)  Other support available: daughter-Ara    Community Resources  Current in home services: [facility]-home nurse & PT (saw several visits in Feb 2021)  Previous services: none    Financial/Employment/Education  Employment Status: disabled; unable to work most of 2020 but has been on short-term disability  Income Source: 's wages  Education: some school  Financial Concerns:  supports her financially  Insurance: [employer] Open Access    Discharge Plan   Patient and family discharge goal: return home with family & home care/therapy services  Provided education on discharge plan: familiar with home care services  Patient agreeable to discharge plan: to be determined  Provided education and attained signature for Medicare IM and IRF Patient Rights and Privacy Information provided to patient : n/a  Provided patient with [facility] Resources: n/a  Barriers to discharge: none identified    Discharge Recommendations   Disposition: return home with family & home care/therapy services  Transportation Needs: family can provide  Name of Transportation Company and Phone: n/a    Additional comments   D:  See H&P for full medical background.  I:  Met with patient to complete assessment and social work consult.  A:  Patient is doing okay, feels tired.  Supportive family who is involved.    P:  SW will follow and assist as needed.    Please invite to Care Conference:  Chris (son)  Amos (son)  Briana ()      SERGEI Garrett  Weekend   Phone: [phone]  Pager: [phone]

## 2021-03-27 NOTE — PLAN OF CARE
Problem: Rehabilitation (IRF) Plan of Care  Goal: Optimal Comfort and Wellbeing  Outcome:  Patent c/o of poor sleep, Provider informed . Expecting change in medication.  He also c/o of constipation. LBM 3/26 Patient is receiving Miralax daily. Provider informed no order received at this point.

## 2021-03-27 NOTE — PLAN OF CARE
Discharge Planner Post-Acute Rehab OT:     Discharge Plan: Home with assist and HH OT    Precautions: fall    Current Status:  ADLs: MAX A for LB dressing, mod A for toileting, Set up for UB cares seated. CGA for transfers using walker  IADLs:not tested  Vision/Cognition: WFL    Assessment: Eval completed, pt is below baseline and will benefit from skilled OT. Use commode with pt in the bathroom since the regular toilet is too high for her. Pt has poor standing tolerance due to fatigue and back pain. ELOS is 10-14 days pending progress. Pt has assist at home as needed.     Other Barriers to Discharge (DME, Family Training, etc): level of assist, DME and family training to be done before discharge.

## 2021-03-27 NOTE — PROGRESS NOTES
"   03/27/21 0635   Quick Adds   Type of Visit Initial Occupational Therapy Evaluation       Present yes   Language Hmong   Living Environment   People in home spouse;child(daron), adult   Current Living Arrangements house   Living Environment Comments 1 DAYLIN, tub with Gb and shower chair,   Self-Care   Usual Activity Tolerance good   Equipment Currently Used at Home shower chair;grab bar, tub/shower   Activity/Exercise/Self-Care Comment PLOF was IND with self cares and IADL.  Pt was in ST disability due to back pain. . Pt likes to cook and garden   Disability/Function   Change in Functional Status Since Onset of Current Illness/Injury yes   General Information   Onset of Illness/Injury or Date of Surgery 03/26/21   Referring Physician Ivett BLAIR   Patient/Family Therapy Goal Statement (OT) to usually keeps very busy and would like to keep herself busy again   Additional Occupational Profile Info/Pertinent History of Current Problem Per chart \" 61-year-old Hmong speaking woman who presents to the acute inpatient rehabilitation unit for muscle weakness due to rhabdomyolysis and SHYANNE due to statins.  She has a history of NSTEMI in January with stent placements at which time she was placed on the statin medication.  Hospital course has been complicated by GI bleeding without an acute source identified, thrombocytopenia, and the need for temporary dialysis due to her SHYANNE.\"   Existing Precautions/Restrictions fall   Limitations/Impairments other (see comments)  (endurance, strength)   Cognitive Status Examination   Cognitive Status Comments WFL   Visual Perception   Impact of Vision Impairment on Function (Vision) WFL   Pain Assessment   Patient Currently in Pain No   Range of Motion Comprehensive   Comment, General Range of Motion UE WFL   Strength Comprehensive (MMT)   Comment, General Manual Muscle Testing (MMT) Assessment Deconditioned overall    Coordination   Coordination Comments WFL   ARC " Assessment Only   Acute Rehab Functional Assessment See IP Rehab Daily Documentation Flowsheet for Functional Mobility/ADL Assessment   Clinical Impression   Criteria for Skilled Therapeutic Interventions Met (OT) yes   OT Diagnosis ADL and mobility deficit   OT Problem List-Impairments impacting ADL activity tolerance impaired;balance;mobility;strength   ADL comments/analysis ADL and mobility deficit   Assessment of Occupational Performance 5 or more Performance Deficits   Identified Performance Deficits ADL and mobility deficit   Planned Therapy Interventions (OT) ADL retraining;IADL retraining;strengthening;transfer training;progressive activity/exercise   Clinical Decision Making Complexity (OT) low complexity   Therapy Frequency (OT) Daily   Predicted Duration of Therapy 10 days   Anticipated Equipment Needs Upon Discharge (OT)   (TBD)   Risk & Benefits of therapy have been explained evaluation/treatment results reviewed;care plan/treatment goals reviewed   Comment-Clinical Impression Pt is below baseline due to weakness and decreased endurance. Pt will benefit from skille dOT to regain indepenence.    Total Evaluation Time (Minutes)   Total Evaluation Time (Minutes) 15

## 2021-03-27 NOTE — PROGRESS NOTES
Discharge Planner Post-Acute Rehab PT:     Dx: NSTEMI, SHYANNE with rhabdo    Discharge Plan: home with family assist (pt reports son [facility] and would be available 24/7)    Precautions: Fall  Hmong interpretor      Current Status:  Bed Mobility: modA supine to sit; SBA sit to supine, maxA to reposition in bed  Transfer: CGA from std height, Alona from low surface  Gait: 80' with FWW, increased trunk sway, proximal weakness, WC follow.   Stairs: 2 with close CGA, significant fatigue, B rails.   Balance: not formally assess, fall risk due to slow gait speed and proximal weakness.     Assessment:  Pt is a 61 year old female who presents to PT with decreased I with functional mobility after a prolong hospitalization related to SHYANNE and rhabdo after recent NSTEMI. Prior to admit pt was I with intermittent use of SPC. Pt now requires up to modA for functional mobility. Pt will benefit from skilled PT intervention in order to improve listed deficits.       Other Barriers to Discharge (DME, Family Training, etc): likely need for FWW vs 4ww, family training

## 2021-03-28 ENCOUNTER — APPOINTMENT (OUTPATIENT)
Dept: GENERAL RADIOLOGY | Facility: CLINIC | Age: 61
DRG: 557 | End: 2021-03-28
Attending: NURSE PRACTITIONER
Payer: COMMERCIAL

## 2021-03-28 ENCOUNTER — HOSPITAL ENCOUNTER (INPATIENT)
Facility: CLINIC | Age: 61
LOS: 2 days | Discharge: NURSING, REHAB OR LONG-TERM CARE | DRG: 872 | End: 2021-03-30
Attending: EMERGENCY MEDICINE | Admitting: INTERNAL MEDICINE
Payer: COMMERCIAL

## 2021-03-28 ENCOUNTER — APPOINTMENT (OUTPATIENT)
Dept: GENERAL RADIOLOGY | Facility: CLINIC | Age: 61
DRG: 872 | End: 2021-03-28
Attending: EMERGENCY MEDICINE
Payer: COMMERCIAL

## 2021-03-28 VITALS
BODY MASS INDEX: 24.39 KG/M2 | OXYGEN SATURATION: 98 % | HEIGHT: 59 IN | SYSTOLIC BLOOD PRESSURE: 124 MMHG | WEIGHT: 121 LBS | HEART RATE: 115 BPM | TEMPERATURE: 98.7 F | DIASTOLIC BLOOD PRESSURE: 75 MMHG | RESPIRATION RATE: 34 BRPM

## 2021-03-28 DIAGNOSIS — R06.02 SHORTNESS OF BREATH: ICD-10-CM

## 2021-03-28 DIAGNOSIS — Z11.52 ENCOUNTER FOR SCREENING LABORATORY TESTING FOR SEVERE ACUTE RESPIRATORY SYNDROME CORONAVIRUS 2 (SARS-COV-2): ICD-10-CM

## 2021-03-28 DIAGNOSIS — N30.00 ACUTE CYSTITIS WITHOUT HEMATURIA: Primary | ICD-10-CM

## 2021-03-28 PROBLEM — E87.20 LACTIC ACIDEMIA: Status: ACTIVE | Noted: 2021-03-28

## 2021-03-28 LAB
ALBUMIN SERPL-MCNC: 2.6 G/DL (ref 3.4–5)
ALBUMIN UR-MCNC: 30 MG/DL
ALP SERPL-CCNC: 127 U/L (ref 40–150)
ALT SERPL W P-5'-P-CCNC: 25 U/L (ref 0–50)
ANION GAP SERPL CALCULATED.3IONS-SCNC: 16 MMOL/L (ref 3–14)
APPEARANCE UR: ABNORMAL
AST SERPL W P-5'-P-CCNC: 45 U/L (ref 0–45)
BACTERIA #/AREA URNS HPF: ABNORMAL /HPF
BILIRUB DIRECT SERPL-MCNC: 0.2 MG/DL (ref 0–0.2)
BILIRUB SERPL-MCNC: 0.5 MG/DL (ref 0.2–1.3)
BILIRUB UR QL STRIP: NEGATIVE
BUN SERPL-MCNC: 28 MG/DL (ref 7–30)
CALCIUM SERPL-MCNC: 8.2 MG/DL (ref 8.5–10.1)
CHLORIDE SERPL-SCNC: 102 MMOL/L (ref 94–109)
CO2 SERPL-SCNC: 18 MMOL/L (ref 20–32)
COLOR UR AUTO: YELLOW
CREAT SERPL-MCNC: 3.55 MG/DL (ref 0.52–1.04)
ERYTHROCYTE [DISTWIDTH] IN BLOOD BY AUTOMATED COUNT: 19.9 % (ref 10–15)
FLUAV RNA RESP QL NAA+PROBE: NEGATIVE
FLUBV RNA RESP QL NAA+PROBE: NEGATIVE
GFR SERPL CREATININE-BSD FRML MDRD: 13 ML/MIN/{1.73_M2}
GLUCOSE BLDC GLUCOMTR-MCNC: 107 MG/DL (ref 70–99)
GLUCOSE BLDC GLUCOMTR-MCNC: 116 MG/DL (ref 70–99)
GLUCOSE BLDC GLUCOMTR-MCNC: 170 MG/DL (ref 70–99)
GLUCOSE SERPL-MCNC: 172 MG/DL (ref 70–99)
GLUCOSE UR STRIP-MCNC: NEGATIVE MG/DL
HCT VFR BLD AUTO: 29.9 % (ref 35–47)
HGB BLD-MCNC: 9.2 G/DL (ref 11.7–15.7)
HGB UR QL STRIP: ABNORMAL
INTERPRETATION ECG - MUSE: NORMAL
KETONES UR STRIP-MCNC: NEGATIVE MG/DL
LABORATORY COMMENT REPORT: NORMAL
LACTATE BLD-SCNC: 4.5 MMOL/L (ref 0.7–2)
LACTATE BLD-SCNC: 5 MMOL/L (ref 0.7–2)
LACTATE BLD-SCNC: 5.4 MMOL/L (ref 0.7–2)
LEUKOCYTE ESTERASE UR QL STRIP: ABNORMAL
MCH RBC QN AUTO: 30.6 PG (ref 26.5–33)
MCHC RBC AUTO-ENTMCNC: 30.8 G/DL (ref 31.5–36.5)
MCV RBC AUTO: 99 FL (ref 78–100)
MUCOUS THREADS #/AREA URNS LPF: PRESENT /LPF
NITRATE UR QL: NEGATIVE
PH UR STRIP: 6 PH (ref 5–7)
PLATELET # BLD AUTO: 388 10E9/L (ref 150–450)
POTASSIUM SERPL-SCNC: 3.7 MMOL/L (ref 3.4–5.3)
PROCALCITONIN SERPL-MCNC: 0.06 NG/ML
PROT SERPL-MCNC: 7.3 G/DL (ref 6.8–8.8)
RBC # BLD AUTO: 3.01 10E12/L (ref 3.8–5.2)
RBC #/AREA URNS AUTO: 1 /HPF (ref 0–2)
RSV RNA SPEC QL NAA+PROBE: NEGATIVE
SARS-COV-2 RNA RESP QL NAA+PROBE: NEGATIVE
SODIUM SERPL-SCNC: 136 MMOL/L (ref 133–144)
SOURCE: ABNORMAL
SP GR UR STRIP: 1.01 (ref 1–1.03)
SPECIMEN SOURCE: NORMAL
SQUAMOUS #/AREA URNS AUTO: 6 /HPF (ref 0–1)
TRANS CELLS #/AREA URNS HPF: <1 /HPF (ref 0–1)
TROPONIN I SERPL-MCNC: <0.015 UG/L (ref 0–0.04)
UROBILINOGEN UR STRIP-MCNC: NORMAL MG/DL (ref 0–2)
WBC # BLD AUTO: 12.4 10E9/L (ref 4–11)
WBC #/AREA URNS AUTO: 70 /HPF (ref 0–5)
WBC CLUMPS #/AREA URNS HPF: PRESENT /HPF

## 2021-03-28 PROCEDURE — 97535 SELF CARE MNGMENT TRAINING: CPT | Mod: GO | Performed by: STUDENT IN AN ORGANIZED HEALTH CARE EDUCATION/TRAINING PROGRAM

## 2021-03-28 PROCEDURE — 81001 URINALYSIS AUTO W/SCOPE: CPT | Performed by: NURSE PRACTITIONER

## 2021-03-28 PROCEDURE — 87186 SC STD MICRODIL/AGAR DIL: CPT | Performed by: PHYSICAL MEDICINE & REHABILITATION

## 2021-03-28 PROCEDURE — 71045 X-RAY EXAM CHEST 1 VIEW: CPT | Mod: 26 | Performed by: RADIOLOGY

## 2021-03-28 PROCEDURE — 36415 COLL VENOUS BLD VENIPUNCTURE: CPT | Performed by: NURSE PRACTITIONER

## 2021-03-28 PROCEDURE — 120N000002 HC R&B MED SURG/OB UMMC

## 2021-03-28 PROCEDURE — 83605 ASSAY OF LACTIC ACID: CPT | Performed by: EMERGENCY MEDICINE

## 2021-03-28 PROCEDURE — 84145 PROCALCITONIN (PCT): CPT | Performed by: NURSE PRACTITIONER

## 2021-03-28 PROCEDURE — 97116 GAIT TRAINING THERAPY: CPT | Mod: GP

## 2021-03-28 PROCEDURE — 999N001017 HC STATISTIC GLUCOSE BY METER IP

## 2021-03-28 PROCEDURE — 258N000003 HC RX IP 258 OP 636: Performed by: NURSE PRACTITIONER

## 2021-03-28 PROCEDURE — 99233 SBSQ HOSP IP/OBS HIGH 50: CPT | Mod: GC | Performed by: PHYSICAL MEDICINE & REHABILITATION

## 2021-03-28 PROCEDURE — 83605 ASSAY OF LACTIC ACID: CPT | Performed by: NURSE PRACTITIONER

## 2021-03-28 PROCEDURE — 87086 URINE CULTURE/COLONY COUNT: CPT | Performed by: PHYSICAL MEDICINE & REHABILITATION

## 2021-03-28 PROCEDURE — 99285 EMERGENCY DEPT VISIT HI MDM: CPT | Mod: 25 | Performed by: EMERGENCY MEDICINE

## 2021-03-28 PROCEDURE — 94640 AIRWAY INHALATION TREATMENT: CPT | Mod: 76

## 2021-03-28 PROCEDURE — 250N000013 HC RX MED GY IP 250 OP 250 PS 637: Performed by: PHYSICIAN ASSISTANT

## 2021-03-28 PROCEDURE — 97110 THERAPEUTIC EXERCISES: CPT | Mod: GP

## 2021-03-28 PROCEDURE — 82248 BILIRUBIN DIRECT: CPT | Performed by: NURSE PRACTITIONER

## 2021-03-28 PROCEDURE — 93010 ELECTROCARDIOGRAM REPORT: CPT | Performed by: EMERGENCY MEDICINE

## 2021-03-28 PROCEDURE — 87636 SARSCOV2 & INF A&B AMP PRB: CPT | Performed by: EMERGENCY MEDICINE

## 2021-03-28 PROCEDURE — 258N000003 HC RX IP 258 OP 636: Performed by: EMERGENCY MEDICINE

## 2021-03-28 PROCEDURE — 96365 THER/PROPH/DIAG IV INF INIT: CPT | Performed by: EMERGENCY MEDICINE

## 2021-03-28 PROCEDURE — 999N000157 HC STATISTIC RCP TIME EA 10 MIN

## 2021-03-28 PROCEDURE — 94640 AIRWAY INHALATION TREATMENT: CPT

## 2021-03-28 PROCEDURE — 80053 COMPREHEN METABOLIC PANEL: CPT | Performed by: NURSE PRACTITIONER

## 2021-03-28 PROCEDURE — 99207 PR CONSULT E&M CHANGED TO INITIAL LEVEL: CPT | Performed by: INTERNAL MEDICINE

## 2021-03-28 PROCEDURE — 96375 TX/PRO/DX INJ NEW DRUG ADDON: CPT | Performed by: EMERGENCY MEDICINE

## 2021-03-28 PROCEDURE — 97530 THERAPEUTIC ACTIVITIES: CPT | Mod: GP

## 2021-03-28 PROCEDURE — 250N000009 HC RX 250: Performed by: INTERNAL MEDICINE

## 2021-03-28 PROCEDURE — 93005 ELECTROCARDIOGRAM TRACING: CPT | Performed by: EMERGENCY MEDICINE

## 2021-03-28 PROCEDURE — 250N000013 HC RX MED GY IP 250 OP 250 PS 637: Performed by: INTERNAL MEDICINE

## 2021-03-28 PROCEDURE — 83605 ASSAY OF LACTIC ACID: CPT | Performed by: PHYSICAL MEDICINE & REHABILITATION

## 2021-03-28 PROCEDURE — 84484 ASSAY OF TROPONIN QUANT: CPT | Performed by: NURSE PRACTITIONER

## 2021-03-28 PROCEDURE — 96361 HYDRATE IV INFUSION ADD-ON: CPT | Performed by: EMERGENCY MEDICINE

## 2021-03-28 PROCEDURE — 96368 THER/DIAG CONCURRENT INF: CPT | Performed by: EMERGENCY MEDICINE

## 2021-03-28 PROCEDURE — 71045 X-RAY EXAM CHEST 1 VIEW: CPT

## 2021-03-28 PROCEDURE — 250N000011 HC RX IP 250 OP 636: Performed by: EMERGENCY MEDICINE

## 2021-03-28 PROCEDURE — 87088 URINE BACTERIA CULTURE: CPT | Performed by: PHYSICAL MEDICINE & REHABILITATION

## 2021-03-28 PROCEDURE — 250N000011 HC RX IP 250 OP 636: Performed by: STUDENT IN AN ORGANIZED HEALTH CARE EDUCATION/TRAINING PROGRAM

## 2021-03-28 PROCEDURE — 87040 BLOOD CULTURE FOR BACTERIA: CPT | Performed by: NURSE PRACTITIONER

## 2021-03-28 PROCEDURE — 71045 X-RAY EXAM CHEST 1 VIEW: CPT | Mod: 77

## 2021-03-28 PROCEDURE — 36415 COLL VENOUS BLD VENIPUNCTURE: CPT | Performed by: PHYSICAL MEDICINE & REHABILITATION

## 2021-03-28 PROCEDURE — 250N000013 HC RX MED GY IP 250 OP 250 PS 637: Performed by: PHYSICAL MEDICINE & REHABILITATION

## 2021-03-28 PROCEDURE — 85027 COMPLETE CBC AUTOMATED: CPT | Performed by: NURSE PRACTITIONER

## 2021-03-28 PROCEDURE — 99223 1ST HOSP IP/OBS HIGH 75: CPT | Performed by: INTERNAL MEDICINE

## 2021-03-28 PROCEDURE — C9803 HOPD COVID-19 SPEC COLLECT: HCPCS | Performed by: EMERGENCY MEDICINE

## 2021-03-28 PROCEDURE — 250N000009 HC RX 250: Performed by: PHYSICAL MEDICINE & REHABILITATION

## 2021-03-28 PROCEDURE — 92610 EVALUATE SWALLOWING FUNCTION: CPT | Mod: GN | Performed by: SPEECH-LANGUAGE PATHOLOGIST

## 2021-03-28 RX ORDER — BISACODYL 10 MG
10 SUPPOSITORY, RECTAL RECTAL DAILY PRN
Status: ON HOLD | DISCHARGE
Start: 2021-03-28 | End: 2021-04-07

## 2021-03-28 RX ORDER — SODIUM CHLORIDE, SODIUM LACTATE, POTASSIUM CHLORIDE, CALCIUM CHLORIDE 600; 310; 30; 20 MG/100ML; MG/100ML; MG/100ML; MG/100ML
INJECTION, SOLUTION INTRAVENOUS CONTINUOUS
Status: DISCONTINUED | OUTPATIENT
Start: 2021-03-28 | End: 2021-03-28 | Stop reason: HOSPADM

## 2021-03-28 RX ORDER — SODIUM CHLORIDE, SODIUM LACTATE, POTASSIUM CHLORIDE, CALCIUM CHLORIDE 600; 310; 30; 20 MG/100ML; MG/100ML; MG/100ML; MG/100ML
INJECTION, SOLUTION INTRAVENOUS ONCE
Status: COMPLETED | OUTPATIENT
Start: 2021-03-28 | End: 2021-03-28

## 2021-03-28 RX ORDER — CEFEPIME HYDROCHLORIDE 1 G/1
1 INJECTION, POWDER, FOR SOLUTION INTRAMUSCULAR; INTRAVENOUS EVERY 24 HOURS
Status: ON HOLD | DISCHARGE
Start: 2021-03-28 | End: 2021-03-29

## 2021-03-28 RX ORDER — PROCHLORPERAZINE MALEATE 5 MG
5 TABLET ORAL EVERY 8 HOURS PRN
Status: ON HOLD | DISCHARGE
Start: 2021-03-28 | End: 2021-04-07

## 2021-03-28 RX ORDER — SODIUM CHLORIDE, SODIUM LACTATE, POTASSIUM CHLORIDE, CALCIUM CHLORIDE 600; 310; 30; 20 MG/100ML; MG/100ML; MG/100ML; MG/100ML
INJECTION, SOLUTION INTRAVENOUS
Status: DISCONTINUED
Start: 2021-03-28 | End: 2021-03-28 | Stop reason: HOSPADM

## 2021-03-28 RX ORDER — GUAIFENESIN/DEXTROMETHORPHAN 100-10MG/5
5 SYRUP ORAL EVERY 4 HOURS PRN
Status: DISCONTINUED | OUTPATIENT
Start: 2021-03-28 | End: 2021-03-28 | Stop reason: HOSPADM

## 2021-03-28 RX ORDER — AMOXICILLIN 250 MG
1 CAPSULE ORAL AT BEDTIME
Status: ON HOLD | DISCHARGE
Start: 2021-03-28 | End: 2021-04-07

## 2021-03-28 RX ORDER — POLYETHYLENE GLYCOL 3350 17 G/17G
1 POWDER, FOR SOLUTION ORAL DAILY PRN
DISCHARGE
Start: 2021-03-28

## 2021-03-28 RX ORDER — VANCOMYCIN HYDROCHLORIDE 1 G/200ML
1000 INJECTION, SOLUTION INTRAVENOUS ONCE
Status: DISCONTINUED | OUTPATIENT
Start: 2021-03-28 | End: 2021-03-28 | Stop reason: HOSPADM

## 2021-03-28 RX ORDER — IPRATROPIUM BROMIDE AND ALBUTEROL SULFATE 2.5; .5 MG/3ML; MG/3ML
3 SOLUTION RESPIRATORY (INHALATION)
Status: DISCONTINUED | OUTPATIENT
Start: 2021-03-28 | End: 2021-03-28 | Stop reason: HOSPADM

## 2021-03-28 RX ORDER — ACETAMINOPHEN 325 MG/1
650 TABLET ORAL EVERY 6 HOURS PRN
DISCHARGE
Start: 2021-03-28

## 2021-03-28 RX ORDER — CEFEPIME HYDROCHLORIDE 1 G/1
1 INJECTION, POWDER, FOR SOLUTION INTRAMUSCULAR; INTRAVENOUS EVERY 24 HOURS
Status: DISCONTINUED | OUTPATIENT
Start: 2021-03-28 | End: 2021-03-28 | Stop reason: HOSPADM

## 2021-03-28 RX ORDER — IPRATROPIUM BROMIDE AND ALBUTEROL SULFATE 2.5; .5 MG/3ML; MG/3ML
3 SOLUTION RESPIRATORY (INHALATION) 4 TIMES DAILY
Status: ON HOLD | DISCHARGE
Start: 2021-03-28 | End: 2021-04-07

## 2021-03-28 RX ORDER — VANCOMYCIN HYDROCHLORIDE 1 G/200ML
1000 INJECTION, SOLUTION INTRAVENOUS ONCE
Qty: 200 ML | Refills: 0 | Status: ON HOLD | DISCHARGE
Start: 2021-03-28 | End: 2021-03-29

## 2021-03-28 RX ORDER — ONDANSETRON 2 MG/ML
4 INJECTION INTRAMUSCULAR; INTRAVENOUS EVERY 30 MIN PRN
Status: DISCONTINUED | OUTPATIENT
Start: 2021-03-28 | End: 2021-03-29

## 2021-03-28 RX ORDER — LANOLIN ALCOHOL/MO/W.PET/CERES
3 CREAM (GRAM) TOPICAL
DISCHARGE
Start: 2021-03-28

## 2021-03-28 RX ADMIN — GUAIFENESIN AND DEXTROMETHORPHAN 5 ML: 100; 10 SYRUP ORAL at 15:28

## 2021-03-28 RX ADMIN — MAGNESIUM OXIDE TAB 400 MG (241.3 MG ELEMENTAL MG) 400 MG: 400 (241.3 MG) TAB at 09:53

## 2021-03-28 RX ADMIN — IPRATROPIUM BROMIDE AND ALBUTEROL SULFATE 3 ML: .5; 3 SOLUTION RESPIRATORY (INHALATION) at 13:26

## 2021-03-28 RX ADMIN — OMEPRAZOLE 40 MG: 20 CAPSULE, DELAYED RELEASE ORAL at 16:44

## 2021-03-28 RX ADMIN — FERROUS SULFATE TAB 325 MG (65 MG ELEMENTAL FE) 325 MG: 325 (65 FE) TAB at 09:53

## 2021-03-28 RX ADMIN — Medication 1 TABLET: at 09:53

## 2021-03-28 RX ADMIN — SODIUM CHLORIDE, POTASSIUM CHLORIDE, SODIUM LACTATE AND CALCIUM CHLORIDE: 600; 310; 30; 20 INJECTION, SOLUTION INTRAVENOUS at 21:35

## 2021-03-28 RX ADMIN — ONDANSETRON 4 MG: 2 INJECTION INTRAMUSCULAR; INTRAVENOUS at 22:12

## 2021-03-28 RX ADMIN — SODIUM CHLORIDE, POTASSIUM CHLORIDE, SODIUM LACTATE AND CALCIUM CHLORIDE 500 ML: 600; 310; 30; 20 INJECTION, SOLUTION INTRAVENOUS at 17:59

## 2021-03-28 RX ADMIN — CALCIUM POLYCARBOPHIL 625 MG: 625 TABLET, FILM COATED ORAL at 09:53

## 2021-03-28 RX ADMIN — IPRATROPIUM BROMIDE AND ALBUTEROL SULFATE 3 ML: .5; 3 SOLUTION RESPIRATORY (INHALATION) at 09:21

## 2021-03-28 RX ADMIN — IPRATROPIUM BROMIDE AND ALBUTEROL SULFATE 3 ML: .5; 3 SOLUTION RESPIRATORY (INHALATION) at 17:26

## 2021-03-28 RX ADMIN — SODIUM CHLORIDE, POTASSIUM CHLORIDE, SODIUM LACTATE AND CALCIUM CHLORIDE 1000 ML: 600; 310; 30; 20 INJECTION, SOLUTION INTRAVENOUS at 20:21

## 2021-03-28 RX ADMIN — POLYETHYLENE GLYCOL 3350 17 G: 17 POWDER, FOR SOLUTION ORAL at 09:54

## 2021-03-28 RX ADMIN — Medication 12.5 MG: at 09:52

## 2021-03-28 RX ADMIN — TICAGRELOR 90 MG: 90 TABLET ORAL at 09:53

## 2021-03-28 RX ADMIN — SODIUM BICARBONATE 650 MG TABLET 650 MG: at 09:53

## 2021-03-28 RX ADMIN — OMEPRAZOLE 20 MG: 20 CAPSULE, DELAYED RELEASE ORAL at 06:37

## 2021-03-28 RX ADMIN — INSULIN ASPART 1 UNITS: 100 INJECTION, SOLUTION INTRAVENOUS; SUBCUTANEOUS at 18:26

## 2021-03-28 RX ADMIN — HEPARIN, PORCINE (PF) 10 UNIT/ML INTRAVENOUS SYRINGE 10 ML: at 13:49

## 2021-03-28 RX ADMIN — THIAMINE HYDROCHLORIDE 500 MG: 100 INJECTION, SOLUTION INTRAMUSCULAR; INTRAVENOUS at 21:27

## 2021-03-28 RX ADMIN — ASPIRIN 81 MG: 81 TABLET, DELAYED RELEASE ORAL at 09:52

## 2021-03-28 SDOH — HEALTH STABILITY: MENTAL HEALTH: HOW OFTEN DO YOU HAVE A DRINK CONTAINING ALCOHOL?: NEVER

## 2021-03-28 ASSESSMENT — MIFFLIN-ST. JEOR
SCORE: 1019.48
SCORE: 1019.48

## 2021-03-28 NOTE — LETTER
Transition Communication Hand-off for Care Transitions to Next Level of Care Provider    Name: Breana Boudreaux  : 1960  MRN #: [id number]  Primary Care Provider: Edwardo Garcia     Primary Clinic: [facility] [address] [facility] [address]     Reason for Hospitalization:  Lactic acidemia [E87.2]  Admit Date/Time: 3/28/2021  7:54 PM  Discharge Date: 3/30/21         Reason for Communication Hand-off Referral: Care Transitions    Discharge Plan: Return to [address]  [place]  [address]  P: [phone]  F: [phone]    Discharge Needs Assessment:  Needs      Most Recent Value   Equipment Currently Used at Home  walker, rolling   # of Referrals Placed by [facility]  Post Acute Facilities        Follow-up specialty is recommended: Yes      Follow-up plan:  No future appointments.    BENSON Ness  7D Hematology/Oncology Social Worker  Phone: [phone]  Pager: [phone]  [email or web address]

## 2021-03-28 NOTE — CONSULTS
Consult Date:  03/28/2021      INTERNAL MEDICINE CONSULTATION      ASSESSMENT:     1.  Two-day history of worsening shortness of breath, frequent cough superimposed upon intermittent dyspnea.  This has occurred in the setting of recent coronary artery interventions in late 01/2021 and early 02/2021, as well as a prolonged recent hospitalization for acute kidney injury and lactic acidosis related to rhabdomyolysis.  The patient's current symptoms are clearly associated with frequent cough, symptoms suggestive of reflux and possible superimposed dysphagia.  She has had transient benefit from DuoNeb therapy.  In spite of her complaints of coughing and shortness of breath, O2 saturations have been maintained in the upper 90s on room air at rest and with activity.  Chest x-ray obtained yesterday revealed no evidence of congestive heart failure.  There were bibasilar streaky opacities, suggestive of atelectasis.  The patient did not have a history of asthma or COPD.  Per her account and per review of the record, there is not a clear history of dysphagia.  It appears that she has never been formally evaluated for esophageal disease, though she did have an EGD 2/22/21 that was stated to be nl.  I think it is highly unlikely that the patient's shortness of breath is related to congestive heart failure or coronary artery ischemia.  A pulmonary embolism appeared to be clinically unlikely as well in view of excellent O2 sats at rest, with activity and in the absence of chest pain. Phrenic nerve irritation (? Related to reflux or other reasons) could also cause the same clinical picture.  Chest CT in 01/2021 revealed no mediastinal abnormalities.  V/Q scan obtained to evaluate shortness of breath, in 02/02/2021 was normal.   2.  Acute kidney injury related to rhabdomyolysis with temporary need for hemodialysis, now stable.   3.  Coronary artery disease, status post coronary angiogram with stent placement 01/2021 and 02/2021.    3.  History of normal EGD 02/22/2021.  I am unable to find the formal report.  GI bleeding was felt to be from the lower GI tract.   4.  Fungal UTI, status post course of fluconazole.   5.  Diabetes mellitus type 2, previously on metformin and Lantus with recent discontinuation of metformin in the setting of lactic acidosis.  Blood glucoses have been controlled with sliding scale insulin coverage only for now.        RECOMMENDATIONS:  The patient's respiratory status will be monitored.  Speech Pathology evaluation is pending for later today.  I will review this assessment to determine the possible need for a video swallow with or without an esophagram.  Would also consider a CT scan of the chest and possibly neck  without contrast to rule out any neck or upper chest pathology.  ENT evaluation may also be helpful if the patient continues to have a cough as a primary symptom.  She will be started on more aggressive therapy for GERD including b.i.d. proton pump inhibitor therapy, elevation of the head of the bed.  Robitussin will be scheduled.  DuoNebs will be scheduled 4 times a day as they seemed to be helpful.  At this point, I do not think there is a need for further cardiac or pulmonary evaluation otherwise in the absence of chest pain, evidence of congestive heart failure, and with excellent O2 saturations on room air at rest and with activity.      The patient's other acute and chronic medical concerns appear stable on her current medication regimen.      Internal Medicine will follow this patient, primarily for her acute respiratory symptoms.      Thank you for having me see this patient.      DISCUSSION:  Breana Boudreaux is a very pleasant 61-year-old female who currently is hospitalized on the acute rehab unit under the care of Dr. Nieves.  I have been asked to see her by Dr. Nieves's service to assess patient's complaints of shortness of breath.  The patient's recent complex hospital courses are reviewed by me.  " She was transferred here from an LTAC following most recent hospitalization at [facility] in 02/2021 for acute kidney injury, lactic acidosis, and rhabdomyolysis, felt secondary to statin therapy.  She did require hemodialysis initially but has had stable renal function.  Her course was also complicated by GI bleeding with a reported normal EGD but with a blood clot found on flexible sigmoidoscopy.  This hospitalization comes on the heels of treatment for coronary artery disease at the end of 01/2021 and early 02/2021.  The patient had staged multiple coronary artery stents placed, most recently on 02/03/2021.      The patient's main physical complaint since admission to the acute rehab unit on 03/26/2021 has been that of coughing and shortness of breath.  The patient states and review of her chart confirmed that she has had intermittent shortness of breath even preceding her coronary artery intervention.  She has had evaluations as noted above including a CT of the chest and V/Q scans in 01/2021 and in 02/2021, which were unremarkable.  Most recent echocardiogram was performed on 02/22/2021 and revealed normal LV size with hyperdynamic systolic function.  A large pleural effusion was noted at that time as well.  The patient states that her breathing has worsened in the setting of increased coughing over the last 2-3 days.  The coughing occurs throughout the day and at night but seems to be more intense after she eats or drinks food or fluids.  At times she feels like food is \"not making its way down.\"  She does have frequent burping after eating and drinking and also notes hiccups at times after eating and drinking.  She has had infrequent episodes of emesis of what she describes as digested food.  She denies chest pain other than occasional chest tightness after a coughing spell.  She states that she has never had similar coughing issues in the past.  She denies a history of asthma, COPD, and gas never " smoked cigarettes.  She denies any recent episodes of a sensation of food or pills getting stuck in her throat or chest area.      PAST MEDICAL HISTORY:  As outlined above and is primarily remarkable for her recent cardiac concerns, as well as complications of acute renal failure, felt secondary to rhabdomyolysis related to statin therapy.  As above, her recent hospitalizations were also complicated by GI bleeding, felt secondary to lower GI source, transient thrombocytopenia, fungal UTI and diabetes mellitus.      MEDICATIONS:   1.  Aspirin enteric-coated 81 mg daily.   2.  FiberCon 625 mg twice daily.     3.  Medium insulin resistance sliding scale.     4.  DuoNeb twice daily scheduled.     5.  Magnesium oxide 400 mg twice daily.   6.  Metoprolol 12.5 mg twice daily.     7.  Aisha-Christina 1 tablet daily.   8.  Omeprazole 20 mg twice daily.   9.  MiraLax 17 grams daily.   10.  Scopolamine patch.     11.  Brilinta 90 mg twice daily.   12.  Sodium bicarbonate 650 mg twice daily.   13.  Tylenol on a p.r.n. basis.      ALLERGIES:  ACE INHIBITORS, WHICH CAUSED A COUGH.      FAMILY HISTORY:  Reviewed by me and is noncontributory.      SOCIAL HISTORY:  Reviewed by me in Epic.  The patient lives with her spouse.  As above, there is not a history of cigarette use.      PHYSICAL EXAMINATION:   GENERAL:  She is a pleasant female who is sitting in her room.  She was interviewed with an iPad .  She is fully oriented.  She appears to be in no acute distress, though she does have frequent coughing episodes, which are clearly quite anxiety producing for her.   VITAL SIGNS:  O2 saturations throughout the interview and examination were % on room air at rest.  The patient did receive nebulizer DuoNeb treatment during the course of my interview and examination and did have noticeable improvement though not complete resolution of coughing.  Respiratory rate is 14 and unlabored.   HEENT:  Pharynx is benign without exudate.    NECK:  Supple.  There is no adenopathy or thyromegaly.  There is no stridor.   LUNGS:  Clear with good breath sounds throughout.   CARDIAC:  Regular rhythm without murmurs.   ABDOMEN:  Soft, nontender, nondistended.   EXTREMITIES:  No edema.   NEUROLOGIC:  Nonfocal.      LABORATORY DATA:  On admission yesterday remarkable for creatinine 3.67.  Hemoglobin 9.6.  Blood glucoses since admission have been in the 100s.  Chest x-ray performed yesterday, , revealed cardiomegaly without pulmonary edema.  There was mild segmental atelectasis in both lower lobes.         REJI JENNINGS MD             D: 2021   T: 2021   MT:       Name:     YONATHAN UNDERWOOD   MRN:      [id number]        Account:       [id number]   :      1960           Consult Date:  2021      Document: [id number]       cc: Shelia Nieves MD

## 2021-03-28 NOTE — PROGRESS NOTES
Rapid Response Team Note    Assessment    Breana Boudreaux is a 61-year-old female who was been admitted to the acute rehab unit here at [facility] after a protracted course through February for acute kidney injury, lactic acidosis, rhabdomyolysis secondary to likely statin therapy. At that time, she did require hemodialysis initially but had recovered renally.  She now triggered the sepsis protocol secondary to mild tachycardia and increased respiratory rate.  Her lactic acid came back at 4.5 triggering a rapid response.    Of note she has documented lactic acidosis as a diagnosis however, she has no lactic acid resulted in her current chart nor in Care Everywhere.  Her current baseline lactic acid on file.  She does have documented LDH on file which were as high at ~700's and decreased into the low 500's    Upon exam and history, she has had ongoing shortness of breath and cough, but this has been present since January.  She has had an extensive work-up for her shortness of breath and cough.  She currently states that this has not changed recently and she complains of shortness of breath with eating and excessive talking.  She is otherwise in no current distress.  She does not appear dyspneic and her respiratory rate is now normalized.  She remains hemodynamically stable with the exception of a mild tachycardia which she has a known history of for which she takes metoprolol.    A rapid response was called on Breana Boudreaux due to SIRS/Sepsis trigger. This presentation is likely due to her SHYANNE and ongoing recovery of her rhabdomyolysis. She has no current signs of infection nor compartment syndrome    Plan      Addendum: -Repeat lactic acid at 5.4. WBC 12.4 (increased) UA grossly abnormal will large LE and increased WBC, nitrites neg. BC pending. UC pending. C/o N/V  -Will give additional 1 liter of fluid now (1.5 liters total) and start gentle continuous MIVF. Judious use of fluids given her SHYANNE   -Start  Vanco and cefepime  - Hospitalists paged regarding update, pt to transfer to inpatient bed. Hospitalist to facilitate transfer.      - Trend serial lactates.   - STAT BMP with hepatic panel   - STAT CBC   - Total CK  - 500 CC LR bolus now  - Strict I&O   - Chest XR   - Blood cultures and UA; lactic acidosis unlikely infectious in nature, although she was recently treated for a fungal UTI.   -  The Internal Medicine primary team was able to be reached and they are in agreement with the above plan.  -  Disposition: The patient will remain on the current unit. We will continue to monitor this patient closely.  -  Reassessment and plan follow-up will be performed by the primary team        Total Critical Care time spent by me, excluding procedures, was 45 minutes.  Jose Parry, JOSEPH  [facility] AMCOM Job Code [employer] #[id number]    Hospital Course   Brief Summary of events leading to rapid response:   Breana Boudreaux is a 61-year-old female who was been admitted to the acute rehab unit here at [facility] after a protracted course through February for acute kidney injury, lactic acidosis, rhabdomyolysis secondary to likely statin therapy. At that time, she did require hemodialysis initially but had recovered renally.  She now triggered the sepsis protocol secondary to mild tachycardia and increased respiratory rate.  Her lactic acid came back at 4.5 triggering a rapid response.    Admission Diagnosis:   Deconditioning   Lactic acidosis   SHYANNE   Rhabdomyolysis  GERD   Dyspnea        Physical Exam   Temp: 99  F (37.2  C) Temp  Min: 96.5  F (35.8  C)  Max: 99  F (37.2  C)  Resp: 30 Resp  Min: 18  Max: 30  SpO2: 97 % SpO2  Min: 97 %  Max: 100 %  Pulse: 104 Pulse  Min: 104  Max: 106    No data recorded  BP: 112/52 Systolic (24hrs), Av , Min:103 , Max:126   Diastolic (24hrs), Av, Min:52, Max:83     I/Os: I/O last 3 completed shifts:  In: 240 [P.O.:240]  Out: 200 [Urine:200]     Exam:   General: in no acute  distress  Mental Status: AAOx4.  No abnormal findings on exam     Significant Results and Procedures   Lactic Acid:   Recent Labs   Lab Test 03/28/21  1617   LACTS 4.5*     CBC:   Recent Labs   Lab Test 03/27/21  0543   WBC 10.2   HGB 9.6*   HCT 30.5*           Sepsis Evaluation   The patient is not known to have an infection.  NO EVIDENCE OF SEPSIS at this time.  Vital sign, physical exam, and lab findings are likely due to SHYANNE and rhabdomylosis .

## 2021-03-28 NOTE — PROGRESS NOTES
Discharge Planner Post-Acute Rehab PT:     Dx: NSTEMI, SHYANNE with rhabdo    Discharge Plan: home with family assist (pt reports son [facility] and would be available 24/7)    Precautions: Fall  Hmong interpretor-pt declines need 3/28/2021; may need for more complex education; however does well with conversation and basic functional instruction.       Current Status:  Bed Mobility: modA supine to sit; SBA sit to supine, maxA to reposition in bed  Transfer: CGA from std height, Alona from low surface  Gait: 60-80' with FWW, increased trunk sway, proximal weakness, WC follow.   Stairs: 2 with close CGA, significant fatigue, B rails.   Balance: not formally assess, fall risk due to slow gait speed and proximal weakness.     Assessment:  Pt continues being tachy, 105 bpm at rest today, to to 124 with activity, slow HR recovery. C/o fatigue and increased cough today.     Other Barriers to Discharge (DME, Family Training, etc): likely need for FWW vs 4ww, family training

## 2021-03-28 NOTE — PLAN OF CARE
Discharge Planner Post-Acute Rehab SLP:     Discharge Plan: TBD    Precautions: cardiac    Current Status:  Communication: language skills appear intact- speaks Hmong-  used  Cognition: not formally assessed but appears intact   Swallow: regular diet with thin liquids- appears to tolerate but ongoing dypsnea sensation - unclear etiology- had EGD 2/22/21- normal ; pt not points to further down behind chest wall/sternum sensation of fullness but not necessarily while she eats    Assessment: Completed clinical swallow eval per MD orders- pt presents with mostly functional oral pharyngeal swallow- for most of the meal ( and pt did  not eat whole meal- limited amounts)- pt was tolerating regular solids and sips of thin liquids by cup rim and by straw without overt aspiration s/s. O2 sats were maintained at % on room air. However 1x at the start of the meal- pt did have a coughing episode-- she stated it was not related to any sensation of  something being stuck in her throat but pointed again to lower down in the sternum. When pt coughed her O2 sats did dip to 95% but then recovered. Further pt had just had a neb treatment prior to the swallow eval. Discussed the findings with MD- and agreed  in order to r/o possible aspiration or esophageal dysphagia issues that a VFSS should be completed with an esophagram. Will plan to complete this to further assess. In there interim- recommend continue with current regular diet and thin liquids. Pt should be upright for all po, small single bites/sips, alternate solids and liquids, pace self- eat slowly. SLP to follow-up again at a meal     Other Barriers to Discharge (Family Training, etc): TBD

## 2021-03-28 NOTE — PROGRESS NOTES
"  [facility]   Acute Rehabilitation Unit  Daily progress note    INTERVAL HISTORY  Breana Boudreaux was seen and examined at bedside with help of . SOB continues. Mostly with deep breaths and after meals. No baldemar aspiration aspiration/choking, but SLP planning on seeing. Complains of chest tightness, but no chest pain or pressure. No HA, no vision change, no ABD pain, no SP pain, no new focal weakness. Im saw patient, and feels like symptoms less likely pure cardiac or pulmonary. Seems like could be related to GERD, less likely aspiration. SLP to see. BID PPI started. DuoNebs increased BID to QID as they are somewhat helpful.    Current Status:  ADLs: MAX A for LB dressing, mod A for toileting, Set up for UB cares seated. CGA for transfers using walker  IADLs:not tested  Vision/Cognition: WFL    MEDICATIONS  Scheduled meds    aspirin  81 mg Oral Daily     calcium polycarbophil  625 mg Oral BID     heparin lock flush  5-10 mL Intracatheter Q24H     insulin aspart  1-7 Units Subcutaneous TID AC     insulin aspart  1-5 Units Subcutaneous At Bedtime     ipratropium - albuterol 0.5 mg/2.5 mg/3 mL  3 mL Nebulization 4x daily     magnesium oxide  400 mg Oral BID     metoprolol tartrate  12.5 mg Oral BID     multivitamin RENAL  1 tablet Oral Daily     omeprazole  40 mg Oral BID AC     polyethylene glycol  17 g Oral Daily     scopolamine  1 patch Transdermal Q72H    And     scopolamine   Transdermal Q8H     senna-docusate  1 tablet Oral At Bedtime     sodium bicarbonate  650 mg Oral BID     ticagrelor  90 mg Oral BID       PRN meds:  acetaminophen, bisacodyl, glucose **OR** dextrose **OR** glucagon, guaiFENesin-dextromethorphan, heparin lock flush, melatonin, polyethylene glycol, prochlorperazine, sodium chloride (PF)      PHYSICAL EXAM  /80 (BP Location: Left arm)   Pulse 104   Temp 96.5  F (35.8  C) (Oral)   Resp 18   Ht 1.499 m (4' 11\")   Wt 54.9 kg (121 lb)   " SpO2 100%   BMI 24.44 kg/m    Gen: Seated in wheelchair, mildly uncomfortable due to SOB  HEENT: NCAT, EOMI, no nystagmus, AURORA  Cardio: 2+ radail pulse, DP, well perfused, tachycardia.  Pulm: Dry cough, lungs clear to auscultation. Saturating well on RA.  Abd: benign, no SP tenderness.  Ext: WWP, no edema in BLE, no tenderness in calves  Neuro/MSK: 4/5 in c5-t1 and l2-s1 myotomes, CN 2-12 intact, AAOx3.    LABS  Recent Results (from the past 240 hour(s))   COVID-19 VIRUS (CORONAVIRUS) BY PCR - EXTERNAL RESULT    Collection Time: 03/19/21  6:43 PM   Result Value Ref Range    COVID-19 Virus by PCR (External Result) Negative Negative, Invalid   Glucose by meter    Collection Time: 03/26/21  1:13 PM   Result Value Ref Range    Glucose 149 (H) 70 - 99 mg/dL   EKG 12-lead, complete    Collection Time: 03/26/21  3:37 PM   Result Value Ref Range    Interpretation ECG Click View Image link to view waveform and result    Glucose by meter    Collection Time: 03/26/21  4:50 PM   Result Value Ref Range    Glucose 144 (H) 70 - 99 mg/dL   Glucose by meter    Collection Time: 03/26/21  8:50 PM   Result Value Ref Range    Glucose 155 (H) 70 - 99 mg/dL   Basic metabolic panel    Collection Time: 03/27/21  5:43 AM   Result Value Ref Range    Sodium 139 133 - 144 mmol/L    Potassium 3.6 3.4 - 5.3 mmol/L    Chloride 107 94 - 109 mmol/L    Carbon Dioxide 21 20 - 32 mmol/L    Anion Gap 11 3 - 14 mmol/L    Glucose 108 (H) 70 - 99 mg/dL    Urea Nitrogen 28 7 - 30 mg/dL    Creatinine 3.67 (H) 0.52 - 1.04 mg/dL    GFR Estimate 13 (L) >60 mL/min/[1.73_m2]    GFR Estimate If Black 15 (L) >60 mL/min/[1.73_m2]    Calcium 8.2 (L) 8.5 - 10.1 mg/dL   Magnesium    Collection Time: 03/27/21  5:43 AM   Result Value Ref Range    Magnesium 2.0 1.6 - 2.3 mg/dL   CBC with platelets differential    Collection Time: 03/27/21  5:43 AM   Result Value Ref Range    WBC 10.2 4.0 - 11.0 10e9/L    RBC Count 3.12 (L) 3.8 - 5.2 10e12/L    Hemoglobin 9.6 (L) 11.7 -  15.7 g/dL    Hematocrit 30.5 (L) 35.0 - 47.0 %    MCV 98 78 - 100 fl    MCH 30.8 26.5 - 33.0 pg    MCHC 31.5 31.5 - 36.5 g/dL    RDW 19.5 (H) 10.0 - 15.0 %    Platelet Count 382 150 - 450 10e9/L    Diff Method Automated Method     % Neutrophils 48.5 %    % Lymphocytes 37.2 %    % Monocytes 8.9 %    % Eosinophils 4.2 %    % Basophils 0.9 %    % Immature Granulocytes 0.3 %    Nucleated RBCs 0 0 /100    Absolute Neutrophil 5.0 1.6 - 8.3 10e9/L    Absolute Lymphocytes 3.8 0.8 - 5.3 10e9/L    Absolute Monocytes 0.9 0.0 - 1.3 10e9/L    Absolute Eosinophils 0.4 0.0 - 0.7 10e9/L    Absolute Basophils 0.1 0.0 - 0.2 10e9/L    Abs Immature Granulocytes 0.0 0 - 0.4 10e9/L    Absolute Nucleated RBC 0.0    Glucose by meter    Collection Time: 03/27/21  7:29 AM   Result Value Ref Range    Glucose 103 (H) 70 - 99 mg/dL   Glucose by meter    Collection Time: 03/27/21 12:35 PM   Result Value Ref Range    Glucose 146 (H) 70 - 99 mg/dL   Glucose by meter    Collection Time: 03/27/21  4:57 PM   Result Value Ref Range    Glucose 149 (H) 70 - 99 mg/dL   Glucose by meter    Collection Time: 03/27/21  9:29 PM   Result Value Ref Range    Glucose 126 (H) 70 - 99 mg/dL   Glucose by meter    Collection Time: 03/28/21  7:14 AM   Result Value Ref Range    Glucose 107 (H) 70 - 99 mg/dL       CXR 3/27/2021  Findings:   Right IJ central venous catheter tip in the high right atrium. Heart  enlarged. Pulmonary vasculature not engorged. Minimal bibasilar  streaky opacity suggesting atelectasis.                                                                 Impression: Cardiomegaly without overt edema. Minimal subsegmental  atelectasis both lower lobes.    CXR 3/3/2021  IMPRESSION: Right dual-lumen central venous catheter and right IJ central venous catheter tip near the atrial caval junction. Cardiac enlargement again seen. Pulmonary vascularity within normal limits. Lungs clear. No change.    ASSESSMENT AND PLAN  Mayblia Xayangmellissa Boudreaux is a 61 year old  woman with CAD s/p recent stenting 2/3/21, DM type 2, HTN, RLS, who presented 2/21/21 with shortness of breath found to be in SHYANNE with lactic acidosis, rhabdomyolysis, with other electrolyte abnormalities was started on dialysis, hospital stay complicated by GI bleed, UTI, thrombocytopenia,  discharged to LTAC 3/12 last dialysis 3/13. Admitted to acute rehab 3/26/21.         Admission to acute inpatient rehab rhabdomyolysis  Impairment group code: 03.8       --Vitals stable. O2 sats good on RA. Unclear etiology of SOB. CXR unrevealing except for atelectasis. IS ordered. Ordered orthostatics (negative). Lungs sound clear. Afebrile.   --Weight stable. No edema in legs.  --DuoNeb increased to QID. Patient felt better after AM DuoNeb.  --SLP to evaluate swallow.  --No nausea reported this AM.  --IM saw patient. Cough possibly related to GERD. BID PPI started. SLP to see patient today to help evaluate for dysphagia. IM feels less likely purely cardiac or pulmonary cause of cough in light of work-up thus far, but will monitor.  --SLP to do VFSS and esophagram.  --Later in shift she triggered sepsis protocol and lactate 4.5. Rapid called. Eval completed by RRT, and felt patient is stable (stayed on unit). However, sepsis work-up initiated. Lactate to be trended. Felt that in setting of known history of rhabdomyolysis, h/o lactate elevation, current lactate elevation likely more related to known problems than current SOB and dry cough.  --Repeat CXR done. UA with +LE, -Nitrite. No UTI s/s. UC pending. CBC, CK, and CMP sent by RRT. Blood cultures x2. WBC count 12.4 (10.2 on 3/27 and was 12.2 on 3/23). Of note, patient has PICC/CVC in place from OSH (was getting HD). May consider removal and culture tip if clinical picture becomes more infectious.  --Upon further review of meds, scopolamine started for nausea on admission. Given her CAD history, dry cough, and mild tachycardia will remove patch for now and monitor. Patient  has PRN compazine for nausea.  --RRT gave 500 ml bolus NS.   -F/U lactate 5.4. RRT and hospitalist evaluated and felt patient should be transferred to acute medical floor for work-up of SOB, lactate elevation in setting of h/o SHYANNE and rhabdo with new leukocytosis and question of sepsis. Additional 1L NS running and vancomycin and cefepime ordered by evaluating RRT.    Patient seen and discussed with Dr. Montoya, staff physician.  Silvio Gonzalez MD  PGY-4, PM&R  [facility]          IJoslyn, saw this patient with my resident Dr. Gonzalez and agree with his findings and plan of care as documented in his note.     I personally reviewed the chart (vitals signs, medications, and labs).     My key findings and negron manegement decisions made by me:   See my attestation in her discharge summary.     I spent a total of 40 minutes face-to-face and managing the care of the patient. Over 50% of my time on the unit was spent counseling the patient and coordinating care. See note for details.

## 2021-03-28 NOTE — PLAN OF CARE
Discharge Planner Post-Acute Rehab OT:      Discharge Plan: Home with assist and HH OT     Precautions: fall     Current Status:  ADLs: MAX A for LB dressing, mod A for toileting, Set up for UB cares seated. CGA for transfers using walker  IADLs:not tested  Vision/Cognition: WFL     Assessment: Shower assessment  completed, MIN A for transfer to tub bench. MIN A for bathing seated. MAX A for LB dressing. Pt sat for sinkside ADL due to poor standing tolerance and back pain. Pt c/o back pain with sitting as well. Pt also still coughing and with dyspnea on exertion. MD aware. -10      Other Barriers to Discharge (DME, Family Training, etc): level of assist, DME and family training to be done before discharge.

## 2021-03-28 NOTE — PLAN OF CARE
FOCUS/GOAL  Bowel management, Bladder management, and Safety management    ASSESSMENT, INTERVENTIONS AND CONTINUING PLAN FOR GOAL  Pt was A/O, able to use call light and make needs known to staff, needs an  at times, speaks basic English. On O2 inhalation at 1L/m at HS via NC for SOB. Denies chest pain, slight dizziness noted with prolonged standing, relieved with sitting or lying down per report. A1 walker with transfers. On regular, low fat diet, thin liquids, takes medicines whole, ate less than 25% of meal, sliding scale insulin not given, encouraged to eat. Cont of BL, no BM this shift, LBM-3/26/21. PICC line in place, dressing CDI. BGs monitored, VSS. Chg bathe completed. Will continue with current POC.

## 2021-03-28 NOTE — PLAN OF CARE
FOCUS/GOAL  Medical management    ASSESSMENT, INTERVENTIONS AND CONTINUING PLAN FOR GOAL:    Pt is alert and oriented. Hmong speaking but understands simple english. Able to communicate needs. Received with O2 at 1LPM via nc satting at 100%. Dyspnea on exertion noted. Mod A1 in transfers with the gait belt and walker. Pt seems to be weaker tonight with transfers, slowed movement, no changes in neuros. Needing cues with transfers. Denies dizziness during transfer and pain. Continent of bowel and bladder. Pt at one time called and told this writer that she just had a bm on the pad, however upon inspection pt did not have any bm at all. Was able to call again for assistance to the bathroom and had a BM after an hour. Will continue POC.

## 2021-03-28 NOTE — DISCHARGE SUMMARY
[facility]   Acute Rehabilitation Unit  Discharge summary     Date of Admission: 3/26/2021  Date of Discharge: 3/28/2021  Disposition: [place] [facility]  Primary Care Physician: Edwardo Garcia  Attending physician: Joslyn Montoya MD  Other significant physician provider(s): Dr. Salgado, Dr. Khanna, Dr. Nieves    discharge diagnosis    Admission to acute inpatient rehab rhabdomyolysis  Impairment group code: 03.8    1. Impaired functional mobility and ADLs  2. Lactic acidosis / sepsis  3. Tachypnea, tachycardia and dyspnea   4. Leukocytosis   5. Nausea/vomiting  6. Acute Kidney Injury  7. Rhabdomyolysis  8. CAD  9. HTN  10. Prolonged QTc  11. Hypomagnesemia  12. Recent GI bleed  13. Anemia  14. DM type 2        brief summary  Breana Boudreaux is a 61 year old woman with CAD s/p recent stenting 2/3/21, DM type 2, HTN, RLS, who presented 2/21/21 with shortness of breath found to be in SHYANNE with lactic acidosis, rhabdomyolysis, with other electrolyte abnormalities was started on dialysis, hospital stay complicated by GI bleed, UTI, thrombocytopenia,  discharged to LTAC 3/12 last dialysis 3/13. Admitted to acute rehab 3/26/21.       rehabilitaiton course  -ADLs: MAX A for LB dressing, mod A for toileting, Set up for UB cares seated. CGA for transfers using walker  -Bed Mobility: modA supine to sit; SBA sit to supine, maxA to reposition in bed  -Transfer: CGA from std height, Alona from low surface  -Gait: 60-80' with FWW, increased trunk sway, proximal weakness, WC follow.   -Stairs: 2 with close CGA, significant fatigue, B rails.   -Balance: not formally assess, fall risk due to slow gait speed and proximal weakness.   -Vision/Cognition: WFL  -Swallow: regular diet with thin liquids- appears to tolerate but ongoing dypsnea sensation - unclear etiology- had EGD 2/22/21- normal ; pt not points to further down behind chest wall/sternum sensation of fullness but not necessarily while she eats.  Planned for VFSS and esophagram for frequent coughing on 3/29/2021 prior to transfer off unit.      mEDICAL COURSE  Acute Kidney Injury  Lactic acidosis  Rhabdomyolysis  Concern for sepsis on 3/28  Presented 2/23/21 with SHYANNE, lactic acidosis, rhabdo CK 80,000 requiring HD felt to be secondary to statin. Last HD 3/13.   Interval events:   The patient's weight was stable during the hospital stay.  However on March 27 the patient developed worsening dry cough, and shortness of breath.  There was no sputum production or hemoptysis.  The patient never had a true fever.  The highest temperature was 99  F.  She requested oxygen via nasal cannula for comfort.  However she would saturate well on room air in the high 90s.  The patient's heart rate got increasingly tachycardic from Saturday, March 27 into Sunday, March 28.  A scopolamine patch was stopped to see if this was causing the tachycardia, but this persisted.  The patient had increasing respiratory rate in the evening of Sunday, March 28.  She triggered the sepsis protocol with an elevated heart rate and tachypnea.  Lactate at that time was 4.5 and repeat lactate 2 hours was 5.4.  A full infectious work-up was initiated at that time with a CBC, CMP, CXR, CK, blood cultures x2, urinalysis with reflex to urine culture.  Significant findings were a leukocytosis of 12.4 (which was elevated from the previous day at 10.2), positive UA and increasing lactate with increasing patient shortness of breath and tachypnea/tachycardia with the absence of other signs of infection.  Cultures are still pending.  A CK was not drawn yet.  Discussion with the rapid response team, internal medicine hospitalist and attending physiatrist decided that transfer to the [facility] emergency department for ongoing evaluation of lactic acidosis in the setting of shortness of breath, tachycardia and tachypnea was the best course of management.  The patient came from [facility]  to the ARU, thus transfer back to [facility] would be ideal.  Additionally the patient received 1.5 L of normal saline and plan to start IV vancomycin and cefepime prior to transfer.  The hospitalist involved in the case signed out the clinical situation to the [place] emergency department, and nursing staff on the ARU will arrange for ambulance transportation to the emergency department and give signout to the charge nurse there.  -trend weights, intake/output  -trend BMP   -trend CBC  -trend lactate   -Blood cultures x2 pending. Consider removing PICC/CVC to culture tip.  -Urine culture pending.  -continue to hold statin  -continue to hold metformin  -started on sodium bicarb 650 bid 3/26 per discharging facility  -Vanco/cefepime started 3/28  -CXR unrevelaing  -s/p 1.5 L NS.  -Consider EKG/Echo/CT chest in light of known CAD and setting of SOB. No CP currently, but h/o CAD. Not hypoxic but tachycardic so PE unlikely but should be considered.     CAD- s/p PCI 2/3/21 on brilinta and asa.  -continue dual antiplatelet therapy  -metoprolol 12.5 mg bid dose reduced 3/26     HTN- with hypotension during LTAC stay with metoprolol dose reduced 3/26.   -monitor BP  -continue metoprolol 12.5 mg bid     Nausea/vomiting- reports ongoing since hospitalization 2/21 told was in setting of renal failure though has not improved per her report.    -ppi as above  -encourage intake  -prn compazine  -Stopped scopolamine due to dry cough, tachycardia. Was not helping N/V. Scopolamine came off 3/28.     QTc- mildly prolonged 486 3/16  -Still prolonged. Avoid QT prolonging meds.     Hypomagnesemia- continue mag oxide  -Mag normal 3/28 at 2.     Recent GI bleed- with edg and flex sig 2/22/21 with resolution.   -monitor. Hgb stable.  -continue ppi bid     Anemia- in setting of critical illness, renal failure.  Hgb 9.2 on 3/28 (9.6 on 3/27).     Leucocytosis-  WBC 12.4 3/28. Elevated from 10.2 on 3/27.  -Blood cultures x2 drawn and  pending.  -UA +LE, Many bacteria, WBCs, -Nitrite. No s/s UTI. Trace blood.  -N/V  -CMP showed Cr. Of 3.55 (down from Cr 3.67 on 3/27). Low albumin. Anion gap 16. Lactate 4.5-->5.4 over 2 hours.   -Hgb 9.2 (9.6 3/27).  -Afebrile. Normotensive. RR in 30s-40s. Patient uncomfortable. Tachycardic.  -CXR     DM type 2- Hgb A1c 2/21/21 9.2% previously on metformin, lantus, metformin held in setting of recent lactic acidosis. bg overall stable on ssi, though intake reported as poor due to nausea/ vomiting  -bg qid  -ssi     FEN: regular- cardiac- though appetite poor and n/v.   Bowel: Continent  Bladder: Continent  DVT Prophylaxis: mechanical  GI Prophylaxis: ppi bid  Code: Full Code  Disposition: [facility] ED. Ideally transfer to [facility] after evaluation in ED as she was admitted to ARU from [facility].    dISCHARGE MEDICATIONS  Cannot display discharge medications since this patient is expected but has not yet arrived.        DISCHARGE INSTRUCTIONS AND FOLLOW UP  Discharge Procedure Orders   Reason for your hospital stay   Order Comments: Sepsis   Tachypnea, dyspnea and cough   SHYANNE   Recent rhabdomyolysis     Intake and output   Order Comments: Every shift     Activity - Up with nursing assistance     Order Specific Question Answer Comments   Is discharge order? Yes      Fall precautions     Advance Diet as Tolerated   Order Comments: Follow this diet upon discharge:       Snacks/Supplements Adult: Nepro Oral Supplement; Between Meals      Room Service      Combination Diet Regular Diet Adult; Low Fat Diet     Order Specific Question Answer Comments   Is discharge order? Yes           physical examination    Most recent Vital Signs:   Vitals:    03/28/21 1728 03/28/21 1820 03/28/21 1843 03/28/21 1904   BP:  (!) 143/88 123/84 124/75   BP Location:  Left arm Left arm Left arm   Pulse:  126 117 115   Resp:  (!) 40 (!) 32 (!) 34   Temp:  96.6  F (35.9  C) 97.7  F (36.5  C) 98.7  F (37.1  C)   TempSrc:  Oral Oral Oral    SpO2: 97% 99% 98% 98%   Weight:       Height:           Gen: Seated in bed, uncomfortable due to SOB/tachypnea, anxious.  HEENT: NCAT, EOMI, no nystagmus, AURORA  Cardio: 2+ radail pulse, DP, well perfused, tachycardia.  Pulm: Dry cough, tachypnic  Abd: benign, no SP tenderness.  Ext: WWP, no edema in BLE, no tenderness in calves  Neuro/MSK: 4/5 in c5-t1 and l2-s1 myotomes, CN 2-12 intact, AAOx3.      Patient discussed with Dr. Montoya, staff physician.  Silvio Gonzalez MD  PGY-4, PM&R  [facility]    Physician Attestation   I, Joslyn Montoya, saw and evaluated this patient prior to discharge.  I discussed the patient with the resident/fellow and agree with plan of care as documented in the note.      I personally reviewed vital signs, medications, labs, imaging and notes in the chart .    May was seen on Saturday and complained of cough and SOB. Her vitals were stable but she asked for supplemental O2 for comfort. BMP showed unchanged and abnormal renal function. CBC showed stable anemia. Her case was discussed with hospitalist team; ordered DUONEB and CXR which was unremarkable. Her symptoms continued on Sunday and were slightly worse. Her vitals remained stable and O2 sat wnl on room air. Placed hospitalist consult and reviewed her case with Dr. Khanna. Please see his note and the above note for details of the plan.     Later in the evening she triggered sepsis protocol and LA was 4.5. RRT was called; please see their note for more details. Repeat labs showed higher LA and leukocytosis. RRT and hospitalist team recommended to transfer patient to ED for more evaluation and management of possible sepsis. Reviewed the plan with Dr. Salgado and he called ED team for sign out. Patient was discharged from ARU, transferred to ED vial ambulance and will eventually be placed in the acute care hospital.     Her rehab needs should be evaluated again after she is medically stable. She only completed evaluation day on  ARU before she was discharged.     Joslyn Montoya MD  Date of Service (when I saw the patient): 03/28/21

## 2021-03-28 NOTE — PHARMACY-MEDICATION REGIMEN REVIEW
Pharmacy Medication Regimen Review  Breana Boudreaux is a 61 year old female who is currently in the Acute Rehab Unit.    Assessment: All medications have an appropriate indications, durations and no unnecessary use was found    Plan:   Continue current medications.    Attending provider will be sent this note for review.  If there are any emergent issues noted above, pharmacist will contact provider directly by phone.      Pharmacy will periodically review the resident's medication regimen for any PRN medications not administered in > 72 hours and discontinue them. The pharmacist will discuss gradual dose reductions of psychopharmacologic medications with interdisciplinary team on a regular basis.    Please contact pharmacy if the above does not answer specific medication questions/concerns.    Background:  A pharmacist has reviewed all medications and pertinent medical history today.  Medications were reviewed for appropriate use and any irregularities found are listed with recommendations.      Current Facility-Administered Medications:      acetaminophen (TYLENOL) tablet 650 mg, 650 mg, Oral, Q6H PRN, Ivett Constantino PA     aspirin EC tablet 81 mg, 81 mg, Oral, Daily, Ivett Constantino PA, 81 mg at 03/27/21 0820     bisacodyl (DULCOLAX) Suppository 10 mg, 10 mg, Rectal, Daily PRN, Ivett Constantino PA     calcium polycarbophil (FIBERCON) tablet 625 mg, 625 mg, Oral, BID, Ivett Constantino PA, 625 mg at 03/27/21 2139     glucose gel 15-30 g, 15-30 g, Oral, Q15 Min PRN **OR** dextrose 50 % injection 25-50 mL, 25-50 mL, Intravenous, Q15 Min PRN **OR** glucagon injection 1 mg, 1 mg, Subcutaneous, Q15 Min PRN, Ivett Constantino PA     ferrous sulfate (FEROSUL) tablet 325 mg, 325 mg, Oral, Daily, Ivett Constantino PA, 325 mg at 03/27/21 0820     heparin lock flush 10 UNIT/ML injection 5-10 mL, 5-10 mL, Intracatheter, Q24H, Jose L Gonzalez MD, 10 mL at 03/27/21 1532     heparin lock flush 10  UNIT/ML injection 5-10 mL, 5-10 mL, Intracatheter, Q1H PRN, Jose L Gonzalez MD     insulin aspart (NovoLOG) injection (RAPID ACTING), 1-7 Units, Subcutaneous, TID Dougie HAYES Lindsay B, PA, 1 Units at 03/26/21 1757     insulin aspart (NovoLOG) injection (RAPID ACTING), 1-5 Units, Subcutaneous, At Bedtime, Ivett Constantino PA     ipratropium - albuterol 0.5 mg/2.5 mg/3 mL (DUONEB) neb solution 3 mL, 3 mL, Nebulization, BID, Joslyn Montoya MD, 3 mL at 03/28/21 0921     magnesium oxide (MAG-OX) tablet 400 mg, 400 mg, Oral, BID, Ivett Constantino PA, 400 mg at 03/27/21 2139     melatonin tablet 3 mg, 3 mg, Oral, At Bedtime PRN, Ivett Constantino PA     metoprolol tartrate (LOPRESSOR) half-tab 12.5 mg, 12.5 mg, Oral, BID, Shelia Nieves MD, 12.5 mg at 03/27/21 2139     multivitamin RENAL (RENAVITE RX/NEPHROVITE) tablet 1 tablet, 1 tablet, Oral, Daily, Ivett Constantino PA, 1 tablet at 03/27/21 0820     omeprazole (priLOSEC) CR capsule 20 mg, 20 mg, Oral, BID AC, Ivett Constantino PA, 20 mg at 03/28/21 0637     polyethylene glycol (MIRALAX) Packet 17 g, 17 g, Oral, Daily, Ivett Constantino PA, 17 g at 03/27/21 0820     polyethylene glycol (MIRALAX) Packet 17 g, 17 g, Oral, Daily PRN, Ivett Constantino PA     prochlorperazine (COMPAZINE) tablet 5 mg, 5 mg, Oral, Q8H PRN, Ivett Constantino PA, 5 mg at 03/27/21 1531     scopolamine (TRANSDERM) 72 hr patch 1 patch, 1 patch, Transdermal, Q72H, 1 patch at 03/26/21 1706 **AND** scopolamine (TRANSDERM-SCOP) Patch in Place, , Transdermal, Q8H, Ivett Constantino PA     senna-docusate (SENOKOT-S/PERICOLACE) 8.6-50 MG per tablet 1 tablet, 1 tablet, Oral, At Bedtime, Ivett Constantino PA, 1 tablet at 03/27/21 2138     sodium bicarbonate tablet 650 mg, 650 mg, Oral, BID, Ivett Constantino PA, 650 mg at 03/27/21 2139     sodium chloride (PF) 0.9% PF flush 10-20 mL, 10-20 mL, Intracatheter, q1 min prn, Jose L Gonzalez MD     ticagrelor  (BRILINTA) tablet 90 mg, 90 mg, Oral, BID, Ivett Constantino PA, 90 mg at 03/27/21 2132  No current outpatient prescriptions on file.   PMH: diabetes mellitus type II, chronic kidney disease stage III, RLS, and coronary artery disease with PCI to LAD 1/26/21 and presented to ED 2/1 with NSTEMI s/p coronary angiogram with 3 stents placed on 2/3/21    Darian Jensen PharmD, BCPS  Ascom *[id number]

## 2021-03-28 NOTE — PROGRESS NOTES
03/28/21 1410   General Information   Onset of Illness/Injury or Date of Surgery 02/21/21   Referring Physician Dr Joslyn Montoya MD   Pertinent History of Current Problem Breana Boudreaux is a 61 year old Hmong speaking woman with past medical history including but not limited to: diabetes mellitus type II, chronic kidney disease stage III, RLS, and coronary artery disease with PCI to LAD 1/26/21 and presented to ED 2/1 with NSTEMI s/p coronary angiogram with 3 stents placed on 2/3/21. She was discharged home  2/4/21 on aspirin, brilinta and crestor 40 mg daily. . Patient presented to [facility] on 2/21/21 with dyspnea. Workup revealed acute kidney injury, lactic acidosis, transaminitis and rhabdomyolysis. There was initial concern for septic shock and patient was initially on empiric antibiotics but this was ruled out and antibiotics were stopped. Patient's acute kidney injury felt to be secondary to rhabdomyolysis that appeared to have been caused by statin. Patient was emergently started on hemodialysis for acute kidney injury and severe hyperkalemia. Patient remained on  hemodialysis during hospital stay. Patient's lactic acidosis felt to be secondary to acute kidney injury and metformin. Pt is referred for swallow eval as has had dyspnea over past couple of days- some s/s consistent with possible GERD but MD wants to r/o aspiration as possible cause   General Observations pleasant and cooperative- speaks Hmong-- uses    Past History of Dysphagia ? some esophageal dysphagai    Disability/Function   Patient's preferred means of communication ;oral    Type of Evaluation   Type of Evaluation Swallow Evaluation   Oral Motor   Oral Musculature anomalies present  (mild left labial droop)   Dentition (Oral Motor)   Dentition (Oral Motor) natural dentition;adequate dentition   Facial Symmetry (Oral Motor)   Facial Symmetry (Oral Motor) left side impairment  (mild left sided  droop)   Lip Function (Oral Motor)   Lip Range of Motion (Oral Motor) retraction impairment  (mild left side)   Tongue Function (Oral Motor)   Tongue ROM (Oral Motor) WNL   Jaw Function (Oral Motor)   Jaw Function (Oral Motor) WNL   Cough/Swallow/Gag Reflex (Oral Motor)   Volitional Throat Clear/Cough (Oral Motor) WNL   Volitional Swallow (Oral Motor) WNL   Vocal Quality/Secretion Management (Oral Motor)   Vocal Quality (Oral Motor) WNL   General Swallowing Observations   Current Diet/Method of Nutritional Intake (General Swallowing Observations, NIS) thin liquids;regular diet   Swallowing Evaluation Clinical swallow evaluation   Clinical Swallow Evaluation   Feeding Assistance set up only required   Additional evaluation(s) completed today No   Clinical Swallow Evaluation Textures Trialed Thin Liquids;Puree Textures;Solid Foods   Clinical Swallow Eval: Thin Liquid Texture Trial   Mode of Presentation, Thin Liquids cup;straw;self-fed   Volume of Liquid or Food Presented small sips by cup   Oral Phase of Swallow WFL   Pharyngeal Phase of Swallow intact   Diagnostic Statement No s/s of aspiration with thin liquids by cup ri or straw   Clinical Swallow Evaluation: Puree Solid Texture Trial   Mode of Presentation, Puree spoon;self-fed   Volume of Puree Presented small bites of puree   Oral Phase, Puree WFL   Pharyngeal Phase, Puree intact   Diagnostic Statement No s/s of aspiration or pharyngeal retention with pureed solids   Clinical Swallow Evaluation: Solid Food Texture Trial   Mode of Presentation, Solid self-fed   Volume of Solid Food Presented small bites of regular solids   Oral Phase, Solid WFL   Pharyngeal Phase, Solid coughing/choking   Successful Strategies Trialed During Procedure, Solid hard swallow;other (see comments)  (alternate solids and liquids)   Diagnostic Statement Pt generally tolerated regular solids throughout short meal and did not report pharyngeal retention but pt did state that it feels  behindher chest wall and sternum that there is a fullness. She did have a coughin epsidode early on in the meal but it did not appear to be related to po intake but difficult to fully r/o. symptoms more consisten with esophageal issues but pt had a normal EGD in Feb 2021   Esophageal Phase of Swallow   Patient reports or presents with symptoms of esophageal dysphagia Yes   Esophageal comments Some symptoms pointing to possible esophageal issues- but had normal EGD 2/22/21   Swallowing Recommendations   Diet Consistency Recommendations thin liquids;regular diet   Supervision Level for Intake patient independent   Mode of Delivery Recommendations bolus size, small   Swallowing Maneuver Recommendations alternate food and liquid intake;effortful (hard) swallow   Recommended Feeding/Eating Techniques (Swallow Eval) maintain upright sitting position for eating   Medication Administration Recommendations, Swallowing (SLP) take whole with water as tolerated   Comment, Swallowing Recommendations functional oral pharyngeal swallow but ? of esophageal dysphagia    General Therapy Interventions   Planned Therapy Interventions Dysphagia Treatment   Dysphagia treatment Instruction of safe swallow strategies   SLP Therapy Assessment/Plan   Criteria for Skilled Therapeutic Interventions Met (SLP Eval) yes;treatment indicated   SLP Diagnosis functional oral pharyngeal dysphagia but ? of esophageal dysphagia   Rehab Potential (SLP Eval) good, to achieve stated therapy goals   Therapy Frequency (SLP Eval) daily   Predicted Duration of Therapy Intervention (SLP Eval) 5 days or less   Comment, Therapy Assessment/Plan (SLP) Completed clinical swallow eval per MD orders- pt presents with mostly functional oral pharyngeal swallow- for most of the meal ( and pt did  not eat whole meal- limited amounts)- pt was tolerating regular solids and sips of thin liquids by cup rim and by straw without overt aspiration s/s. O2 sats were maintained at  % on room air. However 1x at the start of the meal- pt did have a coughing episode-- she stated it was not related to any sensation of  something being stuck in her throat but pointed again to lower down in the sternum. When pt coughed her O2 sats did dip to 95% but then recovered. Further pt had just had a neb treatment prior to the swallow eval. Discussed the findings with MD- and agreed  in order to r/o possible aspiration or esophageal dysphagia issues that a VFSS should be completed with an esophagram. Will plan to complete this to further assess. In there interim- recommend continue with current regular diet and thin liquids. Pt should be upright for all po, small single bites/sips, alternate solids and liquids, pace self- eat slowly. SLP to follow-up again at a meal    Therapy Plan Review/Discharge Plan (SLP)   Therapy Plan Review (SLP) evaluation/treatment results reviewed;care plan/treatment goals reviewed;risks/benefits reviewed;current/potential barriers reviewed;participants voiced agreement with care plan;participants included;patient   Demonstrates Need for Referral to Another Service (SLP) occupational therapist;physical therapist    Total Evaluation Time   Total Evaluation Time (Minutes) 30

## 2021-03-28 NOTE — PROGRESS NOTES
Cross cover note:    Rapid response called on this patient for lactic acidosis 4.5.  Patient seen and evaluated briefly.  Eventually taken over by ICU NP Jose.    See Jose Parry, JOSEPH for details of the rapid response.     Patient follow-up lactic acid 5.4, leukocytosis increased to 12.4, UA concerning for UTI, also reportedly patient with nausea and vomiting, increased tachypnea, tachycardia.  Noted detailed infectious work-up initiated.  Ordered total 1.5 L fluid bolus by the ICU NP along with empiric IV vancomycin and cefepime for possible severe sepsis.    Discussed with Dr. Khanna, hospitalist following the patient and also ARU provider Dr. Montoya.  Decision was made to discharge patient preferably to the [place] ED, given complex medical history, recent hospital course.  Called patient placement, Dr. Banuelos from [place] ED, brief hand off given.     ARU team to complete discharge orders, discharge summary.  Charge nurse updated about the plan.      Agapito Salgado MD  [facility]  Contact information available via [facility] Paging/Directory

## 2021-03-28 NOTE — PLAN OF CARE
FOCUS/GOAL  Medical management    ASSESSMENT, INTERVENTIONS AND CONTINUING PLAN FOR GOAL:  BG before breakfast 107 and 126 before lunch. No sliding scale given. Patient ate twice within 2 hours. She ate home lunch and then her meal from the here. She was educated regarding possible correction needed when eating. Diabetic education will be beneficial for her. YOANA consult requested. She was intermittently  on 1L o2 this shift o2 sat range of  %. C/o of SOB and dyspnea on exertion noted. Provider aware of the situation. She is coughing, new order received for guaifenesin. Med given 1x this shift. Denied pain. Will continue with POC.

## 2021-03-29 LAB
ANION GAP SERPL CALCULATED.3IONS-SCNC: 10 MMOL/L (ref 3–14)
BASE DEFICIT BLDV-SCNC: 1.5 MMOL/L
BASOPHILS # BLD AUTO: 0.1 10E9/L (ref 0–0.2)
BASOPHILS NFR BLD AUTO: 0.9 %
BUN SERPL-MCNC: 25 MG/DL (ref 7–30)
CALCIUM SERPL-MCNC: 8 MG/DL (ref 8.5–10.1)
CHLORIDE SERPL-SCNC: 105 MMOL/L (ref 94–109)
CO2 SERPL-SCNC: 21 MMOL/L (ref 20–32)
CREAT SERPL-MCNC: 3.35 MG/DL (ref 0.52–1.04)
DIFFERENTIAL METHOD BLD: ABNORMAL
EOSINOPHIL # BLD AUTO: 0.5 10E9/L (ref 0–0.7)
EOSINOPHIL NFR BLD AUTO: 4.9 %
ERYTHROCYTE [DISTWIDTH] IN BLOOD BY AUTOMATED COUNT: 19.5 % (ref 10–15)
GFR SERPL CREATININE-BSD FRML MDRD: 14 ML/MIN/{1.73_M2}
GLUCOSE BLDC GLUCOMTR-MCNC: 102 MG/DL (ref 70–99)
GLUCOSE BLDC GLUCOMTR-MCNC: 109 MG/DL (ref 70–99)
GLUCOSE BLDC GLUCOMTR-MCNC: 128 MG/DL (ref 70–99)
GLUCOSE BLDC GLUCOMTR-MCNC: 149 MG/DL (ref 70–99)
GLUCOSE BLDC GLUCOMTR-MCNC: 90 MG/DL (ref 70–99)
GLUCOSE BLDC GLUCOMTR-MCNC: 94 MG/DL (ref 70–99)
GLUCOSE SERPL-MCNC: 94 MG/DL (ref 70–99)
HBA1C MFR BLD: 5.5 % (ref 0–5.6)
HCO3 BLDV-SCNC: 23 MMOL/L (ref 21–28)
HCT VFR BLD AUTO: 24.3 % (ref 35–47)
HGB BLD-MCNC: 7.5 G/DL (ref 11.7–15.7)
IMM GRANULOCYTES # BLD: 0 10E9/L (ref 0–0.4)
IMM GRANULOCYTES NFR BLD: 0.3 %
INTERPRETATION ECG - MUSE: NORMAL
LACTATE BLD-SCNC: 2.8 MMOL/L (ref 0.7–2)
LYMPHOCYTES # BLD AUTO: 3.1 10E9/L (ref 0.8–5.3)
LYMPHOCYTES NFR BLD AUTO: 31.6 %
MAGNESIUM SERPL-MCNC: 2.1 MG/DL (ref 1.6–2.3)
MCH RBC QN AUTO: 31.4 PG (ref 26.5–33)
MCHC RBC AUTO-ENTMCNC: 30.9 G/DL (ref 31.5–36.5)
MCV RBC AUTO: 102 FL (ref 78–100)
MONOCYTES # BLD AUTO: 0.9 10E9/L (ref 0–1.3)
MONOCYTES NFR BLD AUTO: 8.9 %
NEUTROPHILS # BLD AUTO: 5.2 10E9/L (ref 1.6–8.3)
NEUTROPHILS NFR BLD AUTO: 53.4 %
NRBC # BLD AUTO: 0 10*3/UL
NRBC BLD AUTO-RTO: 0 /100
O2/TOTAL GAS SETTING VFR VENT: ABNORMAL %
PCO2 BLDV: 35 MM HG (ref 40–50)
PH BLDV: 7.42 PH (ref 7.32–7.43)
PLATELET # BLD AUTO: 335 10E9/L (ref 150–450)
PO2 BLDV: 32 MM HG (ref 25–47)
POTASSIUM SERPL-SCNC: 3.5 MMOL/L (ref 3.4–5.3)
RBC # BLD AUTO: 2.39 10E12/L (ref 3.8–5.2)
SODIUM SERPL-SCNC: 137 MMOL/L (ref 133–144)
WBC # BLD AUTO: 9.7 10E9/L (ref 4–11)

## 2021-03-29 PROCEDURE — 82803 BLOOD GASES ANY COMBINATION: CPT | Performed by: STUDENT IN AN ORGANIZED HEALTH CARE EDUCATION/TRAINING PROGRAM

## 2021-03-29 PROCEDURE — 83036 HEMOGLOBIN GLYCOSYLATED A1C: CPT | Performed by: STUDENT IN AN ORGANIZED HEALTH CARE EDUCATION/TRAINING PROGRAM

## 2021-03-29 PROCEDURE — 83735 ASSAY OF MAGNESIUM: CPT | Performed by: STUDENT IN AN ORGANIZED HEALTH CARE EDUCATION/TRAINING PROGRAM

## 2021-03-29 PROCEDURE — 120N000002 HC R&B MED SURG/OB UMMC

## 2021-03-29 PROCEDURE — 94640 AIRWAY INHALATION TREATMENT: CPT | Mod: 76

## 2021-03-29 PROCEDURE — 250N000011 HC RX IP 250 OP 636: Performed by: STUDENT IN AN ORGANIZED HEALTH CARE EDUCATION/TRAINING PROGRAM

## 2021-03-29 PROCEDURE — 250N000013 HC RX MED GY IP 250 OP 250 PS 637: Performed by: STUDENT IN AN ORGANIZED HEALTH CARE EDUCATION/TRAINING PROGRAM

## 2021-03-29 PROCEDURE — 36415 COLL VENOUS BLD VENIPUNCTURE: CPT | Performed by: STUDENT IN AN ORGANIZED HEALTH CARE EDUCATION/TRAINING PROGRAM

## 2021-03-29 PROCEDURE — 94640 AIRWAY INHALATION TREATMENT: CPT

## 2021-03-29 PROCEDURE — 250N000012 HC RX MED GY IP 250 OP 636 PS 637: Performed by: STUDENT IN AN ORGANIZED HEALTH CARE EDUCATION/TRAINING PROGRAM

## 2021-03-29 PROCEDURE — 250N000009 HC RX 250: Performed by: STUDENT IN AN ORGANIZED HEALTH CARE EDUCATION/TRAINING PROGRAM

## 2021-03-29 PROCEDURE — 85025 COMPLETE CBC W/AUTO DIFF WBC: CPT | Performed by: STUDENT IN AN ORGANIZED HEALTH CARE EDUCATION/TRAINING PROGRAM

## 2021-03-29 PROCEDURE — 83605 ASSAY OF LACTIC ACID: CPT | Performed by: STUDENT IN AN ORGANIZED HEALTH CARE EDUCATION/TRAINING PROGRAM

## 2021-03-29 PROCEDURE — 999N001017 HC STATISTIC GLUCOSE BY METER IP

## 2021-03-29 PROCEDURE — 80048 BASIC METABOLIC PNL TOTAL CA: CPT | Performed by: STUDENT IN AN ORGANIZED HEALTH CARE EDUCATION/TRAINING PROGRAM

## 2021-03-29 PROCEDURE — 999N000157 HC STATISTIC RCP TIME EA 10 MIN

## 2021-03-29 PROCEDURE — 99223 1ST HOSP IP/OBS HIGH 75: CPT | Mod: AI | Performed by: INTERNAL MEDICINE

## 2021-03-29 RX ORDER — POLYETHYLENE GLYCOL 3350 17 G/17G
17 POWDER, FOR SOLUTION ORAL DAILY PRN
Status: DISCONTINUED | OUTPATIENT
Start: 2021-03-29 | End: 2021-03-30 | Stop reason: HOSPADM

## 2021-03-29 RX ORDER — ONDANSETRON 2 MG/ML
4 INJECTION INTRAMUSCULAR; INTRAVENOUS EVERY 6 HOURS PRN
Status: DISCONTINUED | OUTPATIENT
Start: 2021-03-29 | End: 2021-03-29

## 2021-03-29 RX ORDER — ASPIRIN 81 MG/1
81 TABLET, CHEWABLE ORAL DAILY
Status: DISCONTINUED | OUTPATIENT
Start: 2021-03-29 | End: 2021-03-30 | Stop reason: HOSPADM

## 2021-03-29 RX ORDER — LIDOCAINE 40 MG/G
CREAM TOPICAL
Status: DISCONTINUED | OUTPATIENT
Start: 2021-03-29 | End: 2021-03-30 | Stop reason: HOSPADM

## 2021-03-29 RX ORDER — PIPERACILLIN SODIUM, TAZOBACTAM SODIUM 2; .25 G/10ML; G/10ML
2.25 INJECTION, POWDER, LYOPHILIZED, FOR SOLUTION INTRAVENOUS EVERY 6 HOURS
Status: DISCONTINUED | OUTPATIENT
Start: 2021-03-29 | End: 2021-03-30 | Stop reason: HOSPADM

## 2021-03-29 RX ORDER — GUAIFENESIN/DEXTROMETHORPHAN 100-10MG/5
5 SYRUP ORAL EVERY 4 HOURS PRN
Status: DISCONTINUED | OUTPATIENT
Start: 2021-03-29 | End: 2021-03-30 | Stop reason: HOSPADM

## 2021-03-29 RX ORDER — PROCHLORPERAZINE MALEATE 10 MG
10 TABLET ORAL EVERY 6 HOURS PRN
Status: DISCONTINUED | OUTPATIENT
Start: 2021-03-29 | End: 2021-03-30 | Stop reason: HOSPADM

## 2021-03-29 RX ORDER — CALCIUM POLYCARBOPHIL 625 MG 625 MG/1
1250 TABLET ORAL DAILY
Status: DISCONTINUED | OUTPATIENT
Start: 2021-03-29 | End: 2021-03-30 | Stop reason: HOSPADM

## 2021-03-29 RX ORDER — PROCHLORPERAZINE 25 MG
25 SUPPOSITORY, RECTAL RECTAL EVERY 12 HOURS PRN
Status: DISCONTINUED | OUTPATIENT
Start: 2021-03-29 | End: 2021-03-30 | Stop reason: HOSPADM

## 2021-03-29 RX ORDER — VIT B COMP NO.3/FOLIC/C/BIOTIN 1 MG-60 MG
1 TABLET ORAL DAILY
Status: DISCONTINUED | OUTPATIENT
Start: 2021-03-29 | End: 2021-03-30 | Stop reason: HOSPADM

## 2021-03-29 RX ORDER — FERROUS SULFATE 325(65) MG
325 TABLET ORAL
Status: ON HOLD | COMMUNITY
End: 2021-04-07

## 2021-03-29 RX ORDER — IPRATROPIUM BROMIDE AND ALBUTEROL SULFATE 2.5; .5 MG/3ML; MG/3ML
3 SOLUTION RESPIRATORY (INHALATION) 4 TIMES DAILY
Status: DISCONTINUED | OUTPATIENT
Start: 2021-03-29 | End: 2021-03-30

## 2021-03-29 RX ORDER — FERROUS SULFATE 325(65) MG
325 TABLET ORAL
Status: DISCONTINUED | OUTPATIENT
Start: 2021-03-30 | End: 2021-03-30 | Stop reason: HOSPADM

## 2021-03-29 RX ORDER — ONDANSETRON 4 MG/1
4 TABLET, ORALLY DISINTEGRATING ORAL EVERY 6 HOURS PRN
Status: DISCONTINUED | OUTPATIENT
Start: 2021-03-29 | End: 2021-03-29

## 2021-03-29 RX ORDER — AMOXICILLIN 250 MG
1 CAPSULE ORAL 2 TIMES DAILY PRN
Status: DISCONTINUED | OUTPATIENT
Start: 2021-03-29 | End: 2021-03-30 | Stop reason: HOSPADM

## 2021-03-29 RX ORDER — NICOTINE POLACRILEX 4 MG
15-30 LOZENGE BUCCAL
Status: DISCONTINUED | OUTPATIENT
Start: 2021-03-29 | End: 2021-03-30 | Stop reason: HOSPADM

## 2021-03-29 RX ORDER — GUAIFENESIN/DEXTROMETHORPHAN 100-10MG/5
5 SYRUP ORAL EVERY 4 HOURS PRN
Status: ON HOLD | COMMUNITY
End: 2021-04-07

## 2021-03-29 RX ORDER — MAGNESIUM OXIDE 400 MG/1
400 TABLET ORAL 2 TIMES DAILY
Status: DISCONTINUED | OUTPATIENT
Start: 2021-03-29 | End: 2021-03-30 | Stop reason: HOSPADM

## 2021-03-29 RX ORDER — ACETAMINOPHEN 325 MG/1
650 TABLET ORAL EVERY 4 HOURS PRN
Status: DISCONTINUED | OUTPATIENT
Start: 2021-03-29 | End: 2021-03-30 | Stop reason: HOSPADM

## 2021-03-29 RX ORDER — DEXTROSE MONOHYDRATE 25 G/50ML
25-50 INJECTION, SOLUTION INTRAVENOUS
Status: DISCONTINUED | OUTPATIENT
Start: 2021-03-29 | End: 2021-03-30 | Stop reason: HOSPADM

## 2021-03-29 RX ORDER — AMOXICILLIN 250 MG
2 CAPSULE ORAL 2 TIMES DAILY PRN
Status: DISCONTINUED | OUTPATIENT
Start: 2021-03-29 | End: 2021-03-30 | Stop reason: HOSPADM

## 2021-03-29 RX ORDER — SODIUM BICARBONATE 325 MG/1
650 TABLET ORAL 2 TIMES DAILY
Status: DISCONTINUED | OUTPATIENT
Start: 2021-03-29 | End: 2021-03-30 | Stop reason: HOSPADM

## 2021-03-29 RX ADMIN — ASPIRIN 81 MG CHEWABLE TABLET 81 MG: 81 TABLET CHEWABLE at 09:00

## 2021-03-29 RX ADMIN — OMEPRAZOLE 20 MG: 20 CAPSULE, DELAYED RELEASE ORAL at 16:27

## 2021-03-29 RX ADMIN — SODIUM BICARBONATE 650 MG TABLET 650 MG: at 20:56

## 2021-03-29 RX ADMIN — OMEPRAZOLE 20 MG: 20 CAPSULE, DELAYED RELEASE ORAL at 09:00

## 2021-03-29 RX ADMIN — Medication 12.5 MG: at 09:00

## 2021-03-29 RX ADMIN — SODIUM BICARBONATE 650 MG TABLET 650 MG: at 08:59

## 2021-03-29 RX ADMIN — INSULIN ASPART 1 UNITS: 100 INJECTION, SOLUTION INTRAVENOUS; SUBCUTANEOUS at 16:26

## 2021-03-29 RX ADMIN — IPRATROPIUM BROMIDE AND ALBUTEROL SULFATE 3 ML: .5; 3 SOLUTION RESPIRATORY (INHALATION) at 15:50

## 2021-03-29 RX ADMIN — CALCIUM POLYCARBOPHIL 1250 MG: 625 TABLET, FILM COATED ORAL at 09:47

## 2021-03-29 RX ADMIN — PIPERACILLIN SODIUM AND TAZOBACTAM SODIUM 2.25 G: 2; .25 INJECTION, POWDER, LYOPHILIZED, FOR SOLUTION INTRAVENOUS at 02:44

## 2021-03-29 RX ADMIN — Medication 1 MG: at 20:56

## 2021-03-29 RX ADMIN — PIPERACILLIN SODIUM AND TAZOBACTAM SODIUM 2.25 G: 2; .25 INJECTION, POWDER, LYOPHILIZED, FOR SOLUTION INTRAVENOUS at 09:03

## 2021-03-29 RX ADMIN — TICAGRELOR 90 MG: 90 TABLET ORAL at 08:59

## 2021-03-29 RX ADMIN — PIPERACILLIN SODIUM AND TAZOBACTAM SODIUM 2.25 G: 2; .25 INJECTION, POWDER, LYOPHILIZED, FOR SOLUTION INTRAVENOUS at 20:58

## 2021-03-29 RX ADMIN — PIPERACILLIN SODIUM AND TAZOBACTAM SODIUM 2.25 G: 2; .25 INJECTION, POWDER, LYOPHILIZED, FOR SOLUTION INTRAVENOUS at 14:18

## 2021-03-29 RX ADMIN — IPRATROPIUM BROMIDE AND ALBUTEROL SULFATE 3 ML: .5; 3 SOLUTION RESPIRATORY (INHALATION) at 08:46

## 2021-03-29 RX ADMIN — Medication 12.5 MG: at 20:56

## 2021-03-29 RX ADMIN — PROCHLORPERAZINE MALEATE 10 MG: 10 TABLET ORAL at 17:37

## 2021-03-29 RX ADMIN — B-COMPLEX W/ C & FOLIC ACID TAB 1 MG 1 TABLET: 1 TAB at 09:00

## 2021-03-29 RX ADMIN — MAGNESIUM OXIDE 400 MG: 400 TABLET ORAL at 20:56

## 2021-03-29 RX ADMIN — IPRATROPIUM BROMIDE AND ALBUTEROL SULFATE 3 ML: .5; 3 SOLUTION RESPIRATORY (INHALATION) at 12:19

## 2021-03-29 RX ADMIN — MAGNESIUM OXIDE 400 MG: 400 TABLET ORAL at 08:59

## 2021-03-29 RX ADMIN — TICAGRELOR 90 MG: 90 TABLET ORAL at 20:56

## 2021-03-29 ASSESSMENT — ACTIVITIES OF DAILY LIVING (ADL)
ADLS_ACUITY_SCORE: 18
ADLS_ACUITY_SCORE: 20
ADLS_ACUITY_SCORE: 21

## 2021-03-29 ASSESSMENT — MIFFLIN-ST. JEOR
SCORE: 1038.63
SCORE: 1031.63

## 2021-03-29 NOTE — PLAN OF CARE
Neuro: A&Ox4. Hmong speaking.   Cardiac: ST, 100-110s. VSS.   Respiratory: Sating >90 on RA. 1L for comfort. Frequent cough. Tachypnea, 26-30.     GI/: Adequate urine output. No reported BM.   Diet/appetite: Tolerating regular diet.   Activity:  Assist of 1, up to bedside commode.   Pain: At acceptable level on current regimen.   Skin: No new deficits noted. Blanchable redness on sacrum.   LDA's: PICC x1 SL, x1 TKO + abx.     Plan: Continue with POC. Notify primary team with changes.

## 2021-03-29 NOTE — PROGRESS NOTES
At start of shift, pt was lying in bed, denies pain, SOB, chest pain nor dizziness. At  Around 1553, pt triggered lactic acid protocol of which the result was 4.5, hospital team updated and pt was examined by hospitalist and CNP. Pt claimed that she is SOB, thus, O2 inhalation was resatrted at 2L/min. New orders were received, UA and lab exams were done and providers updated of results. No orders made for UA result, just waiting for UC result to come. Lactated ringers 500ml started as bolus. VS monitored and respirations were increased and lactic acid increased to 5.4 upon recheck. Pt was nauseated and vomited once with saliva. Hospital team again updated by Millie QUAN. An order for transfer was obtained. Pt and family were informed for the reason for transfer.

## 2021-03-29 NOTE — ED NOTES
Bed: ED21  Expected date:   Expected time:   Means of arrival:   Comments:  [facility] pt  Being sent here for triggering sepsis protocol  Lactic acid 4.5, and 5.4  Double lumen PICC in place  Last COVID 3/19 neg

## 2021-03-29 NOTE — ED TRIAGE NOTES
Patient BIBA from [place]. Patient states not feeling well at facility and complaina of cough, shortness of breath, and chest pain. Patient had a lactic acid drawn at other facility which wwas 5.4

## 2021-03-29 NOTE — PLAN OF CARE
Speech Language Therapy Discharge Summary    Reason for therapy discharge:    Change in medical status.    Progress towards therapy goal(s). See goals on Care Plan in [facility] electronic health record for goal details.  Goals partially met.  Barriers to achieving goals:   discharge from facility.    Therapy recommendation(s):    SLP: pt discharged to hospital, pt seen for swallowing evaluation (MD ordered VFSS/esophagram this date, but cancelled 2/2 discharge to hospital) further swallowing evaluation/tx pending medical status

## 2021-03-29 NOTE — PLAN OF CARE
Admission          3/29/2021  2:00AM  -----------------------------------------------------------  Reason for admission: Lactic acidemia   Primary team notified of pt arrival.  Admitted from: Acute rehab  Via: stretcher  Accompanied by: ED RN  Belongings: Placed in closet  Admission Profile: complete  Teaching: orientation to unit and call light- call light within reach, call don't fall, use of console, meal times, when to call for the RN, and enforced importance of safety   Access: PICC  Telemetry:Placed on pt  Ht./Wt.: complete  Code Status verified on armband: yes  2 RN Skin Assessment Completed By: Darya HANNA  Med Rec completed: in progress  Bed surface reassessed with algorithm and charted: yes  New bed surface ordered: no    Pt status:  VSS.   Temp:  [96.5  F (35.8  C)-99  F (37.2  C)] 98  F (36.7  C)  Pulse:  [101-126] 101  Resp:  [18-40] 28  BP: (107-143)/(52-92) 127/78  SpO2:  [97 %-100 %] 100 %

## 2021-03-29 NOTE — PHARMACY-ADMISSION MEDICATION HISTORY
Admission medication history interview status for the 3/28/2021 admission is complete. See Epic admission navigator for allergy information, pharmacy, prior to admission medications and immunization status.     Medication history interview sources:  [facility]    Changes made to PTA medication list (reason)  Added: ferrous sulfate, robitussin DM  Deleted: vanco/cefepime  Changed: added sliding scale to insulin orders    Additional medication history information (including reliability of information, actions taken by pharmacist):None      Prior to Admission medications    Medication Sig Last Dose Taking? Auth Provider   aspirin (ASA) 81 MG chewable tablet Take 81 mg by mouth daily 3/28/2021 at 0952 Yes Reported, Patient   calcium polycarbophil (FIBERCON) 625 MG tablet Take 2 tablets by mouth daily 3/28/2021 at 0953 Yes Reported, Patient   ferrous sulfate (FEROSUL) 325 (65 Fe) MG tablet Take 325 mg by mouth daily (with breakfast) 3/28/2021 at 0953 Yes Unknown, Entered By History   guaiFENesin-dextromethorphan (ROBITUSSIN DM) 100-10 MG/5ML syrup Take 5 mLs by mouth every 4 hours as needed for cough 3/28/2021 at 1528 Yes Unknown, Entered By History   insulin aspart (NOVOLOG PEN) 100 UNIT/ML pen Inject 1-7 Units Subcutaneous 3 times daily (before meals)  Patient taking differently: Inject 1-7 Units Subcutaneous 3 times daily (before meals) Do Not give Correction Insulin if Pre-Meal BG less than 140.   For Pre-Meal  - 189 give 1 unit.   For Pre-Meal  - 239 give 2 units.   For Pre-Meal  - 289 give 3 units.   For Pre-Meal  - 339 give 4 units.   For Pre-Meal - 399 give 5 units.   For Pre-Meal -449 give 6 units  For Pre-Meal BG greater than or equal to 450 give 7 units.   To be given with prandial insulin, and based on pre-meal blood glucose.    Notify provider if glucose greater than or equal to 350 mg/dL after administration of correction dose.  If given at mealtime, administer  within 30 minutes of start of meal 3/28/2021 at 1826 Yes Joslyn Montoya MD   ipratropium - albuterol 0.5 mg/2.5 mg/3 mL (DUONEB) 0.5-2.5 (3) MG/3ML neb solution Take 1 vial (3 mLs) by nebulization 4 times daily 3/28/2021 at 1726 Yes Joslyn Montoya MD   magnesium oxide (MAG-OX) 400 MG tablet Take 400 mg by mouth 2 times daily 3/28/2021 at 0953 Yes Reported, Patient   metoprolol tartrate (LOPRESSOR) 25 MG tablet Take 12.5 mg by mouth 2 times daily 3/28/2021 at 0952 Yes Reported, Patient   multivitamin RENAL (RENAVITE RX/NEPHROVITE) 1 MG tablet Take 1 tablet by mouth daily 3/28/2021 at 0953 Yes Reported, Patient   omeprazole (PRILOSEC) 40 MG DR capsule Take 40 mg by mouth 2 times daily  3/28/2021 at 1644 Yes Reported, Patient   polyethylene glycol (MIRALAX) 17 g packet Take 17 g by mouth daily as needed for constipation 3/28/2021 at 0954 Yes Joslyn Montoya MD   prochlorperazine (COMPAZINE) 5 MG tablet Take 1 tablet (5 mg) by mouth every 8 hours as needed for nausea or vomiting Past Week at Unknown time Yes Joslny Montoya MD   senna-docusate (SENOKOT-S/PERICOLACE) 8.6-50 MG tablet Take 1 tablet by mouth At Bedtime Past Week at Unknown time Yes Joslyn Montoya MD   sodium bicarbonate 650 MG tablet Take 650 mg by mouth 2 times daily 3/28/2021 at 0953 Yes Reported, Patient   ticagrelor (BRILINTA) 90 MG tablet Take 90 mg by mouth 2 times daily 3/28/2021 at 0953 Yes Reported, Patient   acetaminophen (TYLENOL) 325 MG tablet Take 2 tablets (650 mg) by mouth every 6 hours as needed for mild pain Unknown at Unknown time  Joslyn Montoya MD   bisacodyl (DULCOLAX) 10 MG suppository Place 1 suppository (10 mg) rectally daily as needed for constipation Unknown at Unknown time  Joslyn Montoya MD   insulin aspart (NOVOLOG PEN) 100 UNIT/ML pen Inject 1-5 Units Subcutaneous At Bedtime  Patient taking differently: Inject 1-5 Units Subcutaneous At Bedtime Do Not give Bedtime Correction Insulin if BG less than  200.   For  - 249  give 1 units.   For  - 299 give 2 units.   For  - 349 give 3 units.   For  -399 give 4 units.   For BG greater than or equal to 400 give 5 units.  Notify provider if glucose greater than or equal to 350 mg/dL after administration of correction dose. Unknown at Unknown time  Joslyn Montoya MD   melatonin 3 MG tablet Take 1 tablet (3 mg) by mouth nightly as needed for sleep Unknown at Unknown time  Joslyn Montoya MD         Medication history completed by: Kelton Livingston RP on 3/29/2021 at 8:20 AM

## 2021-03-29 NOTE — ED NOTES
requests to be contacted if there are any changes with patient. His name is Briana Boudreaux and phone number: [phone]

## 2021-03-29 NOTE — PROGRESS NOTES
At 1935, pt left for ED at [facility] with EMS team and , IVF was disconnected prior to transfer.

## 2021-03-29 NOTE — H&P
[facility]    History and Physical - [employer] Night Service        Date of Admission:  3/28/2021    Assessment & Plan   61 year old female with history of recent NSTEMI s/p PCI with stent placement 2/3/2021, recent hospitalization 2/21-3/12/2021 for shock in setting of acute renal failure lactic acidosis, rhabdomyolysis, and electrolyte abnormalities, as well as CKDIII, DMII, HTN, HLD who was transferred from [facility] ARU for elevated lactate, tachypnea, tachycardia with concern for sepsis.    #Lactic Acidosis, acute on chronic  #Severe sepsis  #SHYANNE on CKD  #Leukocytosis  #Anion-gap Metabolic Acidosis  #Concern for healthcare-acquired pneumonia  Patient presenting from ARU with acute progressive cough productive of yellow sputum. Lactate of ~5.0 on admission (though is chronically elevated PTA with unclear baseline). Also with new/worsening tachycardia to 110s, tachypnea to upper 20s, new leukocytosis, CXR with increased interstitial and airspace opacities concerning for infection vs. Edema. Oxygen requirements appear to be at baseline, though patient is wearing O2 for comfort. Overall high clinical suspicion for pneumonia as driving etiology. Urinary source could also be playing a role given presence of WBC clumps, +LE on UA. Difficult to assess whether volume status is playing a role in clinical picture as well, however patient appears euvolemic. Hesitant to give additional fluids given history of volume overload. Favor treating primarily as HAP, trending labs/physical exam to better gauge if/when to give fluids. Patient is now s/p 1.5L NS at ARU. Patient was supposed to start Vanc/Zosyn at ARU, but it appears that this did not happen  - Zosyn 2.25mg Q6H (renally dosed)   - No IV fluids ordered, low threshold to start  - VBG, Lactate, BMP, CBC ordered for AM  - Sputum cx, blood cx, urine cx pending  - Continue sodium bicarb 650mg BID    ---------------------------Chronic  Problems---------------------------    #DMII  BGs trending 100s-170s, A1c 2/21 of 9.2%. Previously on metformin, now held in setting of lactic acidosis above.  - Low-dose sliding scale insulin  - Hgb A1c    #CAD  #HTN  S/p PCI 02/03/2021  - Continue PTA Brilinta 90mg BID, ASA81 daily  - Continue PTA metoprolol tartrate 12.5mg BID    #Recent GI bleed  S/p EGD and flex sig 2/22 now with resolution  - PTA omeprazole 20mg BID    #Hx of Prolonged QTc  Avoiding QT-prolonging meds as able        Diet: Low Saturated Fat Na <2400 mg  Fluids: PO, boluses PRN  DVT Prophylaxis: Pneumatic Compression Devices  De Catheter: not present  Code Status: Full Code         Disposition Plan   Expected discharge: 4 - 7 days, recommended to transitional care unit once adequate pain management/ tolerating PO medications, antibiotic plan established and SIRS/Sepsis treated.  Entered: Chip Fabian MD 03/29/2021, 5:32 AM       The patient's care was discussed with the Attending Physician, Dr. Dara Soni.    Chip Fabian MD  [facility]  Contact information available via [facility] Paging/Directory  Please see sign in/sign out for up to date coverage information  ______________________________________________________________________    Chief Complaint   Cough    History is obtained from the patient    History of Present Illness   Breana Boudreaux is a 61 year old female with history of recent NSTEMI s/p PCI with stent placement 2/3/2021, recent hospitalization 2/21-3/12/2021 for shock in setting of acute renal failure lactic acidosis, rhabdomyolysis, and electrolyte abnormalities, as well as CKDIII, DMII, HTN, HLD who was transferred from [facility] ARU for elevated lactate, tachypnea, tachycardia with concern for sepsis.    In brief, Ms. Boudreaux experienced progressive tachycardia and tachypnea while she was at the ARU, eventually triggering sepsis protocol 3/28. Lactate drawn,  elevated at 4.5. Further lab workup demonstrated new leukocytosis, positive UA, concern for sepsis. Decision was made to transfer patient to [place] for further workup. See excellent discharge summary dated 3/28/2021 for more details.    Today, Ms. Boudreaux's primary concern is persistent and progressive cough. She notes it is productive of yellow sputum. She endorses some chest pain associated with the cough. She also feels her breathing is somewhat worse as a result. She denies fevers/chills, abdominal pain.     Review of Systems    The 10 point Review of Systems is negative other than noted in the HPI or here.     Past Medical History    I have reviewed this patient's medical history and updated it with pertinent information if needed.   Past Medical History:   Diagnosis Date     Diabetes (H)      Hypertension       Past Surgical History   I have reviewed this patient's surgical history and updated it with pertinent information if needed.  Past Surgical History:   Procedure Laterality Date     CARDIAC SURGERY       Social History   I have reviewed this patient's social history and updated it with pertinent information if needed. Breana Boudreaux  reports that she has never smoked. She has never used smokeless tobacco. She reports that she does not drink alcohol or use drugs.    Family History   No significant family history    Prior to Admission Medications   Prior to Admission Medications   Prescriptions Last Dose Informant Patient Reported? Taking?   acetaminophen (TYLENOL) 325 MG tablet   No No   Sig: Take 2 tablets (650 mg) by mouth every 6 hours as needed for mild pain   aspirin (ASA) 81 MG chewable tablet   Yes No   Sig: Take 81 mg by mouth daily   bisacodyl (DULCOLAX) 10 MG suppository   No No   Sig: Place 1 suppository (10 mg) rectally daily as needed for constipation   calcium polycarbophil (FIBERCON) 625 MG tablet   Yes No   Sig: Take 2 tablets by mouth daily   ceFEPIme (MAXIPIME) 1 g SOLR vial   No No   Sig:  "Inject 1 g into the vein every 24 hours   insulin aspart (NOVOLOG PEN) 100 UNIT/ML pen   No No   Sig: Inject 1-7 Units Subcutaneous 3 times daily (before meals)   insulin aspart (NOVOLOG PEN) 100 UNIT/ML pen   No No   Sig: Inject 1-5 Units Subcutaneous At Bedtime   ipratropium - albuterol 0.5 mg/2.5 mg/3 mL (DUONEB) 0.5-2.5 (3) MG/3ML neb solution   No No   Sig: Take 1 vial (3 mLs) by nebulization 4 times daily   lactated ringers SOLN   No No   Sig: Inject 1,000 mLs into the vein once for 1 dose   magnesium oxide (MAG-OX) 400 MG tablet   Yes No   Sig: Take 400 mg by mouth 2 times daily   melatonin 3 MG tablet   No No   Sig: Take 1 tablet (3 mg) by mouth nightly as needed for sleep   metoprolol tartrate (LOPRESSOR) 25 MG tablet   Yes No   Sig: Take 12.5 mg by mouth 2 times daily   multivitamin RENAL (RENAVITE RX/NEPHROVITE) 1 MG tablet   Yes No   Sig: Take 1 tablet by mouth daily   omeprazole (PRILOSEC) 20 MG DR capsule   Yes No   Sig: Take 20 mg by mouth 2 times daily   polyethylene glycol (MIRALAX) 17 g packet   No No   Sig: Take 17 g by mouth daily as needed for constipation   prochlorperazine (COMPAZINE) 5 MG tablet   No No   Sig: Take 1 tablet (5 mg) by mouth every 8 hours as needed for nausea or vomiting   senna-docusate (SENOKOT-S/PERICOLACE) 8.6-50 MG tablet   No No   Sig: Take 1 tablet by mouth At Bedtime   sodium bicarbonate 650 MG tablet   Yes No   Sig: Take 650 mg by mouth 2 times daily   ticagrelor (BRILINTA) 90 MG tablet   Yes No   Sig: Take 90 mg by mouth 2 times daily   vancomycin (VANCOCIN) 1000 mg in dextrose 5% 200 mL PREMIX   No No   Sig: Inject 200 mLs (1,000 mg) into the vein once for 1 dose      Facility-Administered Medications: None     Allergies   Allergies   Allergen Reactions     Ace Inhibitors Cough       Physical Exam  Vital signs:  Temp: 98  F (36.7  C) Temp src: Oral BP: 127/78 Pulse: 101   Resp: 26 SpO2: 96 % O2 Device: None (Room air) Oxygen Delivery: 1 LPM Height: 149.9 cm (4' 11\") " "Weight: 54.9 kg (121 lb)  Estimated body mass index is 24.44 kg/m  as calculated from the following:    Height as of this encounter: 1.499 m (4' 11\").    Weight as of this encounter: 54.9 kg (121 lb).    General: Alert, conversational, no apparent distress  Head: Normocephalic, atraumatic  Eyes: EOM grossly intact, sclera anicteric, no conjunctivitis  ENT: Trachea midline  Cardiovascular: Normal S1 and S2 with regular rate and rhythm, no murmurs, no friction rub  Pulmonary: Lungs clear to auscultation across bilateral lung fields, no crackles, no wheezes  Abdomen: Soft, obese, nontender  Musculoskeletal: Grossly normal ROM of bilateral upper and lower extremities, no bony deformities  Neuro: Alert and oriented,speech is clear and fluent, CNII through XII grossly intact bilaterally   Psychiatric: Reactive affect, good personal hygiene, no visual or auditory hallucinations       Data   Data reviewed today: I reviewed all medications, new labs and imaging results over the last 24 hours.    Recent Labs   Lab 03/29/21  0455 03/28/21  1754 03/27/21  0543   WBC 9.7 12.4* 10.2   HGB 7.5* 9.2* 9.6*   * 99 98    388 382    136 139   POTASSIUM 3.5 3.7 3.6   CHLORIDE 105 102 107   CO2 21 18* 21   BUN 25 28 28   CR 3.35* 3.55* 3.67*   ANIONGAP 10 16* 11   LAURA 8.0* 8.2* 8.2*   GLC 94 172* 108*   ALBUMIN  --  2.6*  --    PROTTOTAL  --  7.3  --    BILITOTAL  --  0.5  --    ALKPHOS  --  127  --    ALT  --  25  --    AST  --  45  --    TROPI  --  <0.015  --      "

## 2021-03-29 NOTE — PLAN OF CARE
Occupational Therapy Discharge Summary    Reason for therapy discharge:    Discharged to Hospital    Progress towards therapy goal(s). See goals on Care Plan in [facility] electronic health record for goal details.  Goals not met.  Barriers to achieving goals:   discharge from facility.    Therapy recommendation(s):    Continued therapy is recommended.  Rationale/Recommendations:  Eval pt for therapy needs when medically stable. .

## 2021-03-29 NOTE — PHARMACY-VANCOMYCIN DOSING SERVICE
Pharmacy Vancomycin Initial Note  Date of Service 2021  Patient's  1960  61 year old, female    Indication: Sepsis    Current estimated CrCl = Estimated Creatinine Clearance: 14.4 mL/min (A) (based on SCr of 3.55 mg/dL (H)).    Creatinine for last 3 days  3/27/2021:  5:43 AM Creatinine 3.67 mg/dL  3/28/2021:  5:54 PM Creatinine 3.55 mg/dL    Recent Vancomycin Level(s) for last 3 days  No results found for requested labs within last 72 hours.      Vancomycin IV Administrations (past 72 hours)      No vancomycin orders with administrations in past 72 hours.                Nephrotoxins and other renal medications (From now, onward)    None          Contrast Orders - past 72 hours (72h ago, onward)    None                Plan:  1.  Intermittent vancomycin 1000 mg IV ONCE (18.2 mg/kg)   2.  Goal Trough Level: 15-20 mg/L   3.  Pharmacy will check trough levels as appropriate in 1-3 Days.    4. Serum creatinine levels will be ordered daily for the first week of therapy and at least twice weekly for subsequent weeks.    5. Markle method utilized to dose vancomycin therapy: Method 1 / method 2 barney Jensen, PharmD  PGY-1 Pharmacy Resident

## 2021-03-29 NOTE — PROGRESS NOTES
Pt transferred to 7D from 6B accompanied by her . Denies pain but c/o nausea. PO Compazine given. Dinner ordered. VSS.

## 2021-03-29 NOTE — CONSULTS
Care Management Follow Up    Length of Stay (days): 1    Expected Discharge Date: 03/31/21     Concerns to be Addressed: discharge planning     Patient plan of care discussed at interdisciplinary rounds: Yes    Anticipated Discharge Disposition: Acute Rehab  Anticipated Discharge Services: None  Anticipated Discharge DME: None    Patient/family educated on Medicare website which has current facility and service quality ratings:  N/A - Pt admitted from [facility] ARU  Education Provided on the Discharge Plan:  Discharge plan TBD  Patient/Family in Agreement with the Plan: Discharge plan TBD    Referrals Placed by CM/SW: Post Acute Facilities  Private pay costs discussed: Not applicable    Additional Information:  SW met with Pt and  at bedside to introduce self, SW role and discuss discharge planning. Pt/ requested that SW call son Amos to discuss with him. SW called and left  for son @ 1355; awaiting return call.    Pt is admitted from  ARU; it is anticipated that Pt would return at discharge.  ARU following Pt's chart for likely return when medically stable. Per medical team, possible discharge in 1-2 days.    Addendum @ 1500: SW received return call from Pt's son this afternoon. He confirms that family hopes for Pt to return to  ARU when she is medically stable for discharge. SW confirmed with him that  ARU will follow during her hospitalization and assess appropriateness to return when medically stable. He expressed understanding.    SW to continue to follow and assist with discharge plan as appropriate.     NIKKI Guzman, LICSW  6B Intermediate Care Unit   [facility]  Phone: [phone]  Pager: [phone]

## 2021-03-29 NOTE — ED PROVIDER NOTES
ED Provider Note  [facility]      History     Chief Complaint   Patient presents with     Nausea     The history is provided by the patient and medical records. A  was used (Official [employer] ).     Breana Boudreaux is a 61 year old female with a medical history signficant for CAD s/p percutaneous coronary intervention with stent placement (2/3/2021), CKD stage 3, type 2 diabetes mellitus, hypertension and hyperlipidemia.  Per review of patient's chart, patient was recently hospitalized at [facility] from 2/21/2021 to 3/12/2021 after presenting with shortness of breath.  At that time, patient was found to be in [place] with lactic acidosis, rhabdomyolysis and other electrolyte abnormalities.  Patient was started on hemodialysis with last dialysis being on 3/13/2021.  The patient's hospital course was complicated by GI bleed, UTI and thrombocytopenia.  Patient was transfused for acute blood loss anemia and received platelets for thrombocytopenia.  Patient was then transferred to [facility] from 3/12/2021 to 3/26/2021. Subsequently, the patient was admitted to acute rehab on 3/26/2021.    The patient presents to the Emergency Department today for evaluation of shortness of breath, cough and for abnormal laboratory studies that were done today at the acute rehab facility.  The patient states that since yesterday she has been feeling short of breath and has been coughing a lot.  Patient denies any fevers, chest pain or abdominal pain.  She does note that she feels like her heart is racing.  Patient notes that she did have some problems with nausea, but this has been improved the last couple of days.  Patient denies any recent episodes of vomiting.  The patient had laboratory studies done today at the acute rehab facility.  Patient was found to have a lactic acid of 5.4 and patient was sent here to the Emergency Department for  "further evaluation and management.  The patient here also noted that her calves have felt tight and have been cramping a lot, but she states that this has been an ongoing issue since before her last hospitalization.  She denies any changes in this calf tightness over the last few days.    Past Medical History  Past Medical History:   Diagnosis Date     Diabetes (H)      Hypertension      Past Surgical History:   Procedure Laterality Date     CARDIAC SURGERY       No current outpatient medications on file.    Allergies   Allergen Reactions     Ace Inhibitors Cough     Family History  History reviewed. No pertinent family history.  Social History   Social History     Tobacco Use     Smoking status: Never Smoker     Smokeless tobacco: Never Used   Substance Use Topics     Alcohol use: Never     Frequency: Never     Drug use: Never      Past medical history, past surgical history, medications, allergies, family history, and social history were reviewed with the patient. No additional pertinent items.       Review of Systems  A complete review of systems was performed with pertinent positives and negatives noted in the HPI, and all other systems negative.    Physical Exam   BP: 131/79  Pulse: 110  Temp: 97.7  F (36.5  C)  Resp: 30  Height: 149.9 cm (4' 11\")  Weight: 54.9 kg (121 lb)  SpO2: 100 %  Physical Exam  Vitals signs and nursing note reviewed.   Constitutional:       General: She is not in acute distress.     Appearance: She is well-developed. She is not ill-appearing, toxic-appearing or diaphoretic.      Comments: Comfortably resting, lying in bed, NAD, nondiaphoretic, lucid, fully conversant, no  respiratory distress, alert and oriented.     HENT:      Head: Normocephalic and atraumatic.      Mouth/Throat:      Mouth: Mucous membranes are moist.      Pharynx: No oropharyngeal exudate.   Eyes:      General: No scleral icterus.     Pupils: Pupils are equal, round, and reactive to light.   Neck:      " Musculoskeletal: Normal range of motion and neck supple.   Cardiovascular:      Rate and Rhythm: Tachycardia present.      Pulses: Normal pulses.      Heart sounds: Normal heart sounds.   Pulmonary:      Effort: Pulmonary effort is normal. No respiratory distress.      Breath sounds: Normal breath sounds.   Abdominal:      Palpations: Abdomen is soft.      Tenderness: There is no abdominal tenderness.   Musculoskeletal:         General: No tenderness.   Skin:     General: Skin is warm and dry.      Coloration: Skin is not pale.      Findings: No erythema or rash.   Neurological:      Mental Status: She is alert and oriented to person, place, and time.         ED Course      Procedures     8:55 PM  The patient was seen and examined by Eron Gonzalez MD in Room ED21.                EKG Interpretation:      Interpreted by Gregory Gonzalez MD  Time reviewed:   Symptoms at time of EKG: shortness of breath   Rhythm: junctional  Rate: Tachycardia  Axis: Normal  Ectopy: none  Conduction: normal  ST Segments/ T Waves: No ST-T wave changes  Q Waves: none  Comparison to prior: Unchanged    Clinical Impression: junctional rhythm                   Results for orders placed or performed during the hospital encounter of 03/30/21   Glucose by meter     Status: Abnormal   Result Value Ref Range    Glucose 123 (H) 70 - 99 mg/dL   Glucose by meter     Status: Abnormal   Result Value Ref Range    Glucose 125 (H) 70 - 99 mg/dL   Results for orders placed or performed during the hospital encounter of 03/28/21   XR Chest Port 1 View     Status: None    Narrative    EXAM: XR CHEST PORT 1 VW  3/28/2021 8:54 PM     HISTORY:  shortness of breath       COMPARISON:  Earlier same day chest x-ray, chest x-ray 3/27/2021    FINDINGS: AP radiograph of the chest. Right IJ central venous catheter  with tip overlying the right atrium.    Stable enlargement of the cardiomediastinal silhouette. Coronary  artery stents. No appreciable pneumothorax or  pleural effusion. Patchy  left retrocardiac airspace and interstitial opacities. The visualized  upper abdomen is unremarkable. The bones are stable.      Impression    IMPRESSION:  Increased left retrocardiac interstitial and airspace opacities likely  representing atelectasis versus infection or pulmonary edema.    I have personally reviewed the examination and initial interpretation  and I agree with the findings.    REJI VALLEJO MD   Lactic acid whole blood     Status: Abnormal   Result Value Ref Range    Lactic Acid 5.0 (HH) 0.7 - 2.0 mmol/L   Symptomatic Influenza A/B & SARS-CoV2 (COVID-19) Virus PCR Multiplex     Status: None    Specimen: Nasopharyngeal   Result Value Ref Range    Flu A/B & SARS-COV-2 PCR Source Nasopharyngeal     SARS-CoV-2 PCR Result NEGATIVE     Influenza A PCR Negative NEG^Negative    Influenza B PCR Negative NEG^Negative    Respiratory Syncytial Virus PCR Negative NEG^Negative    Flu A/B & SARS-CoV-2 PCR Comment (Note)    CBC with platelets differential     Status: Abnormal   Result Value Ref Range    WBC 9.7 4.0 - 11.0 10e9/L    RBC Count 2.39 (L) 3.8 - 5.2 10e12/L    Hemoglobin 7.5 (L) 11.7 - 15.7 g/dL    Hematocrit 24.3 (L) 35.0 - 47.0 %     (H) 78 - 100 fl    MCH 31.4 26.5 - 33.0 pg    MCHC 30.9 (L) 31.5 - 36.5 g/dL    RDW 19.5 (H) 10.0 - 15.0 %    Platelet Count 335 150 - 450 10e9/L    Diff Method Automated Method     % Neutrophils 53.4 %    % Lymphocytes 31.6 %    % Monocytes 8.9 %    % Eosinophils 4.9 %    % Basophils 0.9 %    % Immature Granulocytes 0.3 %    Nucleated RBCs 0 0 /100    Absolute Neutrophil 5.2 1.6 - 8.3 10e9/L    Absolute Lymphocytes 3.1 0.8 - 5.3 10e9/L    Absolute Monocytes 0.9 0.0 - 1.3 10e9/L    Absolute Eosinophils 0.5 0.0 - 0.7 10e9/L    Absolute Basophils 0.1 0.0 - 0.2 10e9/L    Abs Immature Granulocytes 0.0 0 - 0.4 10e9/L    Absolute Nucleated RBC 0.0    Basic metabolic panel     Status: Abnormal   Result Value Ref Range    Sodium 137 133 -  144 mmol/L    Potassium 3.5 3.4 - 5.3 mmol/L    Chloride 105 94 - 109 mmol/L    Carbon Dioxide 21 20 - 32 mmol/L    Anion Gap 10 3 - 14 mmol/L    Glucose 94 70 - 99 mg/dL    Urea Nitrogen 25 7 - 30 mg/dL    Creatinine 3.35 (H) 0.52 - 1.04 mg/dL    GFR Estimate 14 (L) >60 mL/min/[1.73_m2]    GFR Estimate If Black 16 (L) >60 mL/min/[1.73_m2]    Calcium 8.0 (L) 8.5 - 10.1 mg/dL   Blood gas venous     Status: Abnormal   Result Value Ref Range    Ph Venous 7.42 7.32 - 7.43 pH    PCO2 Venous 35 (L) 40 - 50 mm Hg    PO2 Venous 32 25 - 47 mm Hg    Bicarbonate Venous 23 21 - 28 mmol/L    Base Deficit Venous 1.5 mmol/L    FIO2 21%    Lactic acid whole blood     Status: Abnormal   Result Value Ref Range    Lactic Acid 2.8 (H) 0.7 - 2.0 mmol/L   Hemoglobin A1c     Status: None   Result Value Ref Range    Hemoglobin A1C 5.5 0 - 5.6 %   Glucose by meter     Status: None   Result Value Ref Range    Glucose 90 70 - 99 mg/dL   Glucose by meter     Status: None   Result Value Ref Range    Glucose 94 70 - 99 mg/dL   Magnesium     Status: None   Result Value Ref Range    Magnesium 2.1 1.6 - 2.3 mg/dL   Glucose by meter     Status: Abnormal   Result Value Ref Range    Glucose 102 (H) 70 - 99 mg/dL   Glucose by meter     Status: Abnormal   Result Value Ref Range    Glucose 149 (H) 70 - 99 mg/dL   Glucose by meter     Status: Abnormal   Result Value Ref Range    Glucose 109 (H) 70 - 99 mg/dL   Glucose by meter     Status: Abnormal   Result Value Ref Range    Glucose 128 (H) 70 - 99 mg/dL   Magnesium     Status: None   Result Value Ref Range    Magnesium 2.0 1.6 - 2.3 mg/dL   Comprehensive metabolic panel     Status: Abnormal   Result Value Ref Range    Sodium 141 133 - 144 mmol/L    Potassium 3.0 (L) 3.4 - 5.3 mmol/L    Chloride 108 94 - 109 mmol/L    Carbon Dioxide 23 20 - 32 mmol/L    Anion Gap 10 3 - 14 mmol/L    Glucose 76 70 - 99 mg/dL    Urea Nitrogen 21 7 - 30 mg/dL    Creatinine 3.20 (H) 0.52 - 1.04 mg/dL    GFR Estimate 15 (L)  >60 mL/min/[1.73_m2]    GFR Estimate If Black 17 (L) >60 mL/min/[1.73_m2]    Calcium 7.9 (L) 8.5 - 10.1 mg/dL    Bilirubin Total 0.6 0.2 - 1.3 mg/dL    Albumin 2.2 (L) 3.4 - 5.0 g/dL    Protein Total 6.5 (L) 6.8 - 8.8 g/dL    Alkaline Phosphatase 114 40 - 150 U/L    ALT 25 0 - 50 U/L    AST 50 (H) 0 - 45 U/L   CBC with platelets     Status: Abnormal   Result Value Ref Range    WBC 8.5 4.0 - 11.0 10e9/L    RBC Count 2.41 (L) 3.8 - 5.2 10e12/L    Hemoglobin 7.3 (L) 11.7 - 15.7 g/dL    Hematocrit 24.1 (L) 35.0 - 47.0 %     78 - 100 fl    MCH 30.3 26.5 - 33.0 pg    MCHC 30.3 (L) 31.5 - 36.5 g/dL    RDW 19.8 (H) 10.0 - 15.0 %    Platelet Count 335 150 - 450 10e9/L   Procalcitonin     Status: None   Result Value Ref Range    Procalcitonin 0.07 ng/ml   Lactic acid whole blood     Status: None   Result Value Ref Range    Lactic Acid 1.0 0.7 - 2.0 mmol/L   Glucose by meter     Status: None   Result Value Ref Range    Glucose 78 70 - 99 mg/dL   Potassium     Status: None   Result Value Ref Range    Potassium 3.7 3.4 - 5.3 mmol/L   Glucose by meter     Status: Abnormal   Result Value Ref Range    Glucose 108 (H) 70 - 99 mg/dL   EKG 12-lead, tracing only     Status: None   Result Value Ref Range    Interpretation ECG Click View Image link to view waveform and result    Care Management / Social Work IP Consult     Status: None ()    Liz Sotelo LICSW     3/29/2021  3:10 PM  Care Management Follow Up    Length of Stay (days): 1    Expected Discharge Date: 03/31/21     Concerns to be Addressed: discharge planning     Patient plan of care discussed at interdisciplinary rounds: Yes    Anticipated Discharge Disposition: Acute Rehab  Anticipated Discharge Services: None  Anticipated Discharge DME: None    Patient/family educated on Medicare website which has current   facility and service quality ratings:  N/A - Pt admitted from [facility]   ARU  Education Provided on the Discharge Plan:  Discharge plan  TBD  Patient/Family in Agreement with the Plan: Discharge plan TBD    Referrals Placed by CM/SW: Post Acute Facilities  Private pay costs discussed: Not applicable    Additional Information:  SW met with Pt and  at bedside to introduce self, SW role   and discuss discharge planning. Pt/ requested that SW call   son Amos to discuss with him. SW called and left  for son @   1355; awaiting return call.    Pt is admitted from [facility] ARU; it is anticipated that Pt would   return at discharge. [facility] ARU following Pt's chart for likely   return when medically stable. Per medical team, possible   discharge in 1-2 days.    Addendum @ 1500: SW received return call from Pt's son this   afternoon. He confirms that family hopes for Pt to return to    ARU when she is medically stable for discharge. SW confirmed with   him that  ARU will follow during her hospitalization and assess   appropriateness to return when medically stable. He expressed   understanding.    SW to continue to follow and assist with discharge plan as   appropriate.     NIKKI Guzman, [facility]  6B Intermediate Care Unit   [facility]  Phone: [phone]  Pager: [phone]       Medications   lactated ringers BOLUS 1,000 mL (0 mLs Intravenous Stopped 3/28/21 2130)   thiamine (B-1) 500 mg in sodium chloride 0.9 % 50 mL intermittent infusion (0 mg Intravenous Stopped 3/28/21 2200)   lactated ringers infusion ( Intravenous Rate/Dose Verify 3/28/21 2338)   potassium chloride ER (KLOR-CON M) CR tablet 40 mEq (40 mEq Oral Given 3/30/21 0819)        Assessments & Plan (with Medical Decision Making)   This is a 61-year-old female patient coming over to the emergency room with shortness of breath that started this evening with an elevated lactate level and leukocytosis with a UA concerning for possible urinary tract infection.  Patient was admitted to the rehab service on the [facility] and had a thorough work-up with labs  completed as well as starting vancomycin and cefepime.  The plan at this time is to continue IV fluids, antibiotics and admit to the medicine service.  I do not see any specific additional symptoms or work-up at this time suggesting other than a urinary tract infection.  Is unclear as to why she is having a cough and shortness of breath as her chest x-ray appears otherwise unremarkable.  Her Covid is negative.  Her lactic acidosis also is concerning as she has a history of elevated lactate levels which was previously thought to be associated with her Metformin.  Patient had a recent hospitalization for this prior to her being at rehab.  At this time she will be admitted to the medicine service for continued care management.    I have reviewed the nursing notes. I have reviewed the findings, diagnosis, plan and need for follow up with the patient.    Discharge Medication List as of 3/30/2021  2:57 PM      START taking these medications    Details   cefpodoxime (VANTIN) 200 MG tablet Take 1 tablet (200 mg) by mouth daily, Disp-4 tablet, R-0, Transitional             Final diagnoses:   Acute cystitis without hematuria       --  I, Neymar Mock, am serving as a trained medical scribe to document services personally performed by Gregory Gonzalez MD, based on the provider's statements to me.     IGregory MD, was physically present and have reviewed and verified the accuracy of this note documented by Neymar Mock.    Gregory Gonzalez MD  [facility] EMERGENCY DEPARTMENT  3/28/2021     Gregory Gonzalez MD  03/30/21 2338       Gregory Gonzalez MD  04/06/21 0028

## 2021-03-29 NOTE — PROGRESS NOTES
Transfer  Transferred to:7D   Via:wheelchair   Reason for transfer:Pt no longer appropriate for 6B- improved patient condition  Family: Aware of transfer  Belongings: Packed and sent with pt  Chart: Delivered with pt to next unit  Medications: Meds sent to new unit with pt  Report given to: LESLIE RN   Pt status:  Temp: 97.9  F (36.6  C) Temp src: Oral BP: 118/88 Pulse: 87   Resp: 18 SpO2: 97 % O2 Device: None (Room air)

## 2021-03-30 ENCOUNTER — HOSPITAL ENCOUNTER (INPATIENT)
Facility: CLINIC | Age: 61
LOS: 9 days | Discharge: HOME HEALTH | DRG: 558 | End: 2021-04-08
Attending: PHYSICAL MEDICINE & REHABILITATION | Admitting: PHYSICAL MEDICINE & REHABILITATION
Payer: COMMERCIAL

## 2021-03-30 VITALS
DIASTOLIC BLOOD PRESSURE: 67 MMHG | BODY MASS INDEX: 25.24 KG/M2 | OXYGEN SATURATION: 99 % | SYSTOLIC BLOOD PRESSURE: 108 MMHG | HEIGHT: 59 IN | TEMPERATURE: 97.7 F | RESPIRATION RATE: 20 BRPM | HEART RATE: 90 BPM | WEIGHT: 125.22 LBS

## 2021-03-30 DIAGNOSIS — N17.9 ACUTE RENAL FAILURE, UNSPECIFIED ACUTE RENAL FAILURE TYPE (H): ICD-10-CM

## 2021-03-30 DIAGNOSIS — E83.42 HYPOMAGNESEMIA: ICD-10-CM

## 2021-03-30 DIAGNOSIS — I25.119 CORONARY ARTERY DISEASE INVOLVING NATIVE HEART WITH ANGINA PECTORIS, UNSPECIFIED VESSEL OR LESION TYPE (H): Primary | ICD-10-CM

## 2021-03-30 DIAGNOSIS — K21.9 GASTROESOPHAGEAL REFLUX DISEASE WITHOUT ESOPHAGITIS: ICD-10-CM

## 2021-03-30 DIAGNOSIS — R53.81 DEBILITY: ICD-10-CM

## 2021-03-30 DIAGNOSIS — E87.6 HYPOKALEMIA: ICD-10-CM

## 2021-03-30 DIAGNOSIS — D64.9 ANEMIA, UNSPECIFIED TYPE: ICD-10-CM

## 2021-03-30 LAB
ALBUMIN SERPL-MCNC: 2.2 G/DL (ref 3.4–5)
ALP SERPL-CCNC: 114 U/L (ref 40–150)
ALT SERPL W P-5'-P-CCNC: 25 U/L (ref 0–50)
ANION GAP SERPL CALCULATED.3IONS-SCNC: 10 MMOL/L (ref 3–14)
AST SERPL W P-5'-P-CCNC: 50 U/L (ref 0–45)
BILIRUB SERPL-MCNC: 0.6 MG/DL (ref 0.2–1.3)
BUN SERPL-MCNC: 21 MG/DL (ref 7–30)
CALCIUM SERPL-MCNC: 7.9 MG/DL (ref 8.5–10.1)
CHLORIDE SERPL-SCNC: 108 MMOL/L (ref 94–109)
CO2 SERPL-SCNC: 23 MMOL/L (ref 20–32)
CREAT SERPL-MCNC: 3.2 MG/DL (ref 0.52–1.04)
ERYTHROCYTE [DISTWIDTH] IN BLOOD BY AUTOMATED COUNT: 19.8 % (ref 10–15)
GFR SERPL CREATININE-BSD FRML MDRD: 15 ML/MIN/{1.73_M2}
GLUCOSE BLDC GLUCOMTR-MCNC: 108 MG/DL (ref 70–99)
GLUCOSE BLDC GLUCOMTR-MCNC: 123 MG/DL (ref 70–99)
GLUCOSE BLDC GLUCOMTR-MCNC: 125 MG/DL (ref 70–99)
GLUCOSE BLDC GLUCOMTR-MCNC: 78 MG/DL (ref 70–99)
GLUCOSE SERPL-MCNC: 76 MG/DL (ref 70–99)
HCT VFR BLD AUTO: 24.1 % (ref 35–47)
HGB BLD-MCNC: 7.3 G/DL (ref 11.7–15.7)
LACTATE BLD-SCNC: 1 MMOL/L (ref 0.7–2)
MAGNESIUM SERPL-MCNC: 2 MG/DL (ref 1.6–2.3)
MCH RBC QN AUTO: 30.3 PG (ref 26.5–33)
MCHC RBC AUTO-ENTMCNC: 30.3 G/DL (ref 31.5–36.5)
MCV RBC AUTO: 100 FL (ref 78–100)
PLATELET # BLD AUTO: 335 10E9/L (ref 150–450)
POTASSIUM SERPL-SCNC: 3 MMOL/L (ref 3.4–5.3)
POTASSIUM SERPL-SCNC: 3.7 MMOL/L (ref 3.4–5.3)
PROCALCITONIN SERPL-MCNC: 0.07 NG/ML
PROT SERPL-MCNC: 6.5 G/DL (ref 6.8–8.8)
RBC # BLD AUTO: 2.41 10E12/L (ref 3.8–5.2)
SODIUM SERPL-SCNC: 141 MMOL/L (ref 133–144)
WBC # BLD AUTO: 8.5 10E9/L (ref 4–11)

## 2021-03-30 PROCEDURE — 128N000003 HC R&B REHAB

## 2021-03-30 PROCEDURE — 83735 ASSAY OF MAGNESIUM: CPT | Performed by: STUDENT IN AN ORGANIZED HEALTH CARE EDUCATION/TRAINING PROGRAM

## 2021-03-30 PROCEDURE — 999N000147 HC STATISTIC PT IP EVAL DEFER

## 2021-03-30 PROCEDURE — 250N000013 HC RX MED GY IP 250 OP 250 PS 637: Performed by: STUDENT IN AN ORGANIZED HEALTH CARE EDUCATION/TRAINING PROGRAM

## 2021-03-30 PROCEDURE — 250N000011 HC RX IP 250 OP 636: Performed by: STUDENT IN AN ORGANIZED HEALTH CARE EDUCATION/TRAINING PROGRAM

## 2021-03-30 PROCEDURE — 84145 PROCALCITONIN (PCT): CPT | Performed by: STUDENT IN AN ORGANIZED HEALTH CARE EDUCATION/TRAINING PROGRAM

## 2021-03-30 PROCEDURE — 999N001017 HC STATISTIC GLUCOSE BY METER IP

## 2021-03-30 PROCEDURE — 99239 HOSP IP/OBS DSCHRG MGMT >30: CPT | Mod: GC | Performed by: STUDENT IN AN ORGANIZED HEALTH CARE EDUCATION/TRAINING PROGRAM

## 2021-03-30 PROCEDURE — 36592 COLLECT BLOOD FROM PICC: CPT | Performed by: STUDENT IN AN ORGANIZED HEALTH CARE EDUCATION/TRAINING PROGRAM

## 2021-03-30 PROCEDURE — 80053 COMPREHEN METABOLIC PANEL: CPT | Performed by: STUDENT IN AN ORGANIZED HEALTH CARE EDUCATION/TRAINING PROGRAM

## 2021-03-30 PROCEDURE — 250N000009 HC RX 250: Performed by: STUDENT IN AN ORGANIZED HEALTH CARE EDUCATION/TRAINING PROGRAM

## 2021-03-30 PROCEDURE — 83605 ASSAY OF LACTIC ACID: CPT | Performed by: STUDENT IN AN ORGANIZED HEALTH CARE EDUCATION/TRAINING PROGRAM

## 2021-03-30 PROCEDURE — 85027 COMPLETE CBC AUTOMATED: CPT | Performed by: STUDENT IN AN ORGANIZED HEALTH CARE EDUCATION/TRAINING PROGRAM

## 2021-03-30 PROCEDURE — 999N000157 HC STATISTIC RCP TIME EA 10 MIN

## 2021-03-30 PROCEDURE — 250N000013 HC RX MED GY IP 250 OP 250 PS 637: Performed by: INTERNAL MEDICINE

## 2021-03-30 PROCEDURE — 36592 COLLECT BLOOD FROM PICC: CPT | Performed by: INTERNAL MEDICINE

## 2021-03-30 PROCEDURE — 84132 ASSAY OF SERUM POTASSIUM: CPT | Performed by: INTERNAL MEDICINE

## 2021-03-30 PROCEDURE — 94640 AIRWAY INHALATION TREATMENT: CPT

## 2021-03-30 RX ORDER — CEFPODOXIME PROXETIL 200 MG/1
200 TABLET, FILM COATED ORAL DAILY
Status: COMPLETED | OUTPATIENT
Start: 2021-03-31 | End: 2021-04-03

## 2021-03-30 RX ORDER — IPRATROPIUM BROMIDE AND ALBUTEROL SULFATE 2.5; .5 MG/3ML; MG/3ML
3 SOLUTION RESPIRATORY (INHALATION) EVERY 4 HOURS PRN
Status: DISCONTINUED | OUTPATIENT
Start: 2021-03-30 | End: 2021-03-30 | Stop reason: HOSPADM

## 2021-03-30 RX ORDER — ASPIRIN 81 MG/1
81 TABLET, CHEWABLE ORAL DAILY
Status: DISCONTINUED | OUTPATIENT
Start: 2021-03-31 | End: 2021-04-08 | Stop reason: HOSPADM

## 2021-03-30 RX ORDER — CEFPODOXIME PROXETIL 200 MG/1
200 TABLET, FILM COATED ORAL DAILY
Qty: 4 TABLET | Refills: 0 | Status: ON HOLD | DISCHARGE
Start: 2021-03-30 | End: 2021-04-07

## 2021-03-30 RX ORDER — MAGNESIUM OXIDE 400 MG/1
400 TABLET ORAL 2 TIMES DAILY
Status: DISCONTINUED | OUTPATIENT
Start: 2021-03-30 | End: 2021-04-08 | Stop reason: HOSPADM

## 2021-03-30 RX ORDER — FERROUS SULFATE 325(65) MG
325 TABLET ORAL
Status: DISCONTINUED | OUTPATIENT
Start: 2021-03-31 | End: 2021-04-08 | Stop reason: HOSPADM

## 2021-03-30 RX ORDER — CALCIUM POLYCARBOPHIL 625 MG 625 MG/1
1250 TABLET ORAL DAILY
Status: DISCONTINUED | OUTPATIENT
Start: 2021-03-31 | End: 2021-04-08 | Stop reason: HOSPADM

## 2021-03-30 RX ORDER — GUAIFENESIN/DEXTROMETHORPHAN 100-10MG/5
5 SYRUP ORAL EVERY 4 HOURS PRN
Status: DISCONTINUED | OUTPATIENT
Start: 2021-03-30 | End: 2021-04-07

## 2021-03-30 RX ORDER — IPRATROPIUM BROMIDE AND ALBUTEROL SULFATE 2.5; .5 MG/3ML; MG/3ML
3 SOLUTION RESPIRATORY (INHALATION) 4 TIMES DAILY PRN
Status: DISCONTINUED | OUTPATIENT
Start: 2021-03-30 | End: 2021-04-08 | Stop reason: HOSPADM

## 2021-03-30 RX ORDER — VIT B COMP NO.3/FOLIC/C/BIOTIN 1 MG-60 MG
1 TABLET ORAL DAILY
Status: DISCONTINUED | OUTPATIENT
Start: 2021-03-31 | End: 2021-04-08 | Stop reason: HOSPADM

## 2021-03-30 RX ORDER — NICOTINE POLACRILEX 4 MG
15-30 LOZENGE BUCCAL
Status: DISCONTINUED | OUTPATIENT
Start: 2021-03-30 | End: 2021-04-08 | Stop reason: HOSPADM

## 2021-03-30 RX ORDER — PROCHLORPERAZINE MALEATE 5 MG
5 TABLET ORAL EVERY 8 HOURS PRN
Status: DISCONTINUED | OUTPATIENT
Start: 2021-03-30 | End: 2021-04-08 | Stop reason: HOSPADM

## 2021-03-30 RX ORDER — DEXTROSE MONOHYDRATE 25 G/50ML
25-50 INJECTION, SOLUTION INTRAVENOUS
Status: DISCONTINUED | OUTPATIENT
Start: 2021-03-30 | End: 2021-04-08 | Stop reason: HOSPADM

## 2021-03-30 RX ORDER — ACETAMINOPHEN 325 MG/1
650 TABLET ORAL EVERY 6 HOURS PRN
Status: DISCONTINUED | OUTPATIENT
Start: 2021-03-30 | End: 2021-04-08 | Stop reason: HOSPADM

## 2021-03-30 RX ORDER — POLYETHYLENE GLYCOL 3350 17 G/17G
17 POWDER, FOR SOLUTION ORAL DAILY PRN
Status: DISCONTINUED | OUTPATIENT
Start: 2021-03-30 | End: 2021-04-08 | Stop reason: HOSPADM

## 2021-03-30 RX ORDER — SODIUM BICARBONATE 650 MG/1
650 TABLET ORAL 2 TIMES DAILY
Status: DISCONTINUED | OUTPATIENT
Start: 2021-03-30 | End: 2021-04-08 | Stop reason: HOSPADM

## 2021-03-30 RX ORDER — HEPARIN SODIUM,PORCINE 10 UNIT/ML
3 VIAL (ML) INTRAVENOUS
Status: DISCONTINUED | OUTPATIENT
Start: 2021-03-30 | End: 2021-03-30 | Stop reason: HOSPADM

## 2021-03-30 RX ORDER — POTASSIUM CHLORIDE 1.5 G/1.58G
40 POWDER, FOR SOLUTION ORAL ONCE
Status: DISCONTINUED | OUTPATIENT
Start: 2021-03-30 | End: 2021-03-30

## 2021-03-30 RX ORDER — AMOXICILLIN 250 MG
1 CAPSULE ORAL 2 TIMES DAILY PRN
Status: DISCONTINUED | OUTPATIENT
Start: 2021-03-30 | End: 2021-04-08 | Stop reason: HOSPADM

## 2021-03-30 RX ORDER — LANOLIN ALCOHOL/MO/W.PET/CERES
3 CREAM (GRAM) TOPICAL
Status: DISCONTINUED | OUTPATIENT
Start: 2021-03-30 | End: 2021-04-08 | Stop reason: HOSPADM

## 2021-03-30 RX ORDER — POTASSIUM CHLORIDE 750 MG/1
40 TABLET, EXTENDED RELEASE ORAL ONCE
Status: COMPLETED | OUTPATIENT
Start: 2021-03-30 | End: 2021-03-30

## 2021-03-30 RX ADMIN — Medication 12.5 MG: at 08:18

## 2021-03-30 RX ADMIN — TICAGRELOR 90 MG: 90 TABLET ORAL at 20:31

## 2021-03-30 RX ADMIN — CALCIUM POLYCARBOPHIL 1250 MG: 625 TABLET, FILM COATED ORAL at 08:19

## 2021-03-30 RX ADMIN — PROCHLORPERAZINE MALEATE 5 MG: 5 TABLET ORAL at 19:00

## 2021-03-30 RX ADMIN — Medication 3 ML: at 10:00

## 2021-03-30 RX ADMIN — POTASSIUM CHLORIDE 40 MEQ: 750 TABLET, EXTENDED RELEASE ORAL at 08:19

## 2021-03-30 RX ADMIN — MAGNESIUM OXIDE 400 MG: 400 TABLET ORAL at 08:19

## 2021-03-30 RX ADMIN — PIPERACILLIN SODIUM AND TAZOBACTAM SODIUM 2.25 G: 2; .25 INJECTION, POWDER, LYOPHILIZED, FOR SOLUTION INTRAVENOUS at 02:03

## 2021-03-30 RX ADMIN — IPRATROPIUM BROMIDE AND ALBUTEROL SULFATE 3 ML: .5; 3 SOLUTION RESPIRATORY (INHALATION) at 07:39

## 2021-03-30 RX ADMIN — OMEPRAZOLE 20 MG: 20 CAPSULE, DELAYED RELEASE ORAL at 08:19

## 2021-03-30 RX ADMIN — SODIUM BICARBONATE 650 MG TABLET 650 MG: at 17:48

## 2021-03-30 RX ADMIN — SODIUM BICARBONATE 650 MG TABLET 650 MG: at 08:18

## 2021-03-30 RX ADMIN — ASPIRIN 81 MG CHEWABLE TABLET 81 MG: 81 TABLET CHEWABLE at 08:18

## 2021-03-30 RX ADMIN — Medication 3 ML: at 14:49

## 2021-03-30 RX ADMIN — MAGNESIUM OXIDE TAB 400 MG (241.3 MG ELEMENTAL MG) 400 MG: 400 (241.3 MG) TAB at 17:48

## 2021-03-30 RX ADMIN — PIPERACILLIN SODIUM AND TAZOBACTAM SODIUM 2.25 G: 2; .25 INJECTION, POWDER, LYOPHILIZED, FOR SOLUTION INTRAVENOUS at 08:21

## 2021-03-30 RX ADMIN — FERROUS SULFATE TAB 325 MG (65 MG ELEMENTAL FE) 325 MG: 325 (65 FE) TAB at 08:19

## 2021-03-30 RX ADMIN — B-COMPLEX W/ C & FOLIC ACID TAB 1 MG 1 TABLET: 1 TAB at 08:18

## 2021-03-30 RX ADMIN — TICAGRELOR 90 MG: 90 TABLET ORAL at 08:19

## 2021-03-30 RX ADMIN — Medication 5 ML: at 13:11

## 2021-03-30 RX ADMIN — PIPERACILLIN SODIUM AND TAZOBACTAM SODIUM 2.25 G: 2; .25 INJECTION, POWDER, LYOPHILIZED, FOR SOLUTION INTRAVENOUS at 14:50

## 2021-03-30 RX ADMIN — Medication 12.5 MG: at 20:31

## 2021-03-30 RX ADMIN — OMEPRAZOLE 20 MG: 20 CAPSULE, DELAYED RELEASE ORAL at 17:48

## 2021-03-30 ASSESSMENT — ACTIVITIES OF DAILY LIVING (ADL)
ADLS_ACUITY_SCORE: 18

## 2021-03-30 ASSESSMENT — MIFFLIN-ST. JEOR: SCORE: 1030.37

## 2021-03-30 NOTE — PLAN OF CARE
PT7D: cancel- pt with plans to discharge back to ARU today, per SW no PT re-eval needed as pt with very short re-admission and remains appropriate for ARU, ride setup for 15:45.

## 2021-03-30 NOTE — PLAN OF CARE
7D OT - Cancel    Evaluation orders received and appreciated. Per PT, Pt plans to discharge back to ARU today, OT/PT re-eval not needed as pt had very short hospital stay, remains appropriate for ARU. Ride set up for 15:45.

## 2021-03-30 NOTE — DISCHARGE SUMMARY
[facility]  Discharge Summary - Medicine & Pediatrics       Date of Admission:  3/28/2021  Date of Discharge:  3/30/2021  Discharging Provider: Alan Krishnamurthy MD  Discharge Service: Seamus Lara    Discharge Diagnoses   Acute cystitis without hematuria  Acute kidney injury on chronic kidney disease  Acute normocytic anemia due to GI bleed  Hypokalemia  DMT2  CAD  Essential hypertension  Prolonged QTc 673 on 3/26  Lactic acidosis    Follow-ups Needed After Discharge       Unresulted Labs Ordered in the Past 30 Days of this Admission     Date and Time Order Name Status Description    3/28/2021 1722 Blood culture Preliminary     3/28/2021 1722 Blood culture Preliminary     3/28/2021 1705 Urine Culture Aerobic Bacterial Preliminary       These results will be followed up by ARU physician    Discharge Disposition   Discharged to [facility] rehabilitation unit  Condition at discharge: Good    Hospital Course   Breana Boudreaux was admitted on 3/28/2021 for suspected sepsis from UTI.  The following problems were addressed during her hospitalization:      Severe sepsis due to acute cystitis without hematuria  Lactic Acidosis, acute on chronic  SHYANNE on CKD  Concern for healthcare-acquired pneumonia  Patient presenting from ARU with acute progressive cough productive of yellow sputum. Lactate of ~5.0 on admission (though is chronically elevated PTA with unclear baseline). Also with new/worsening tachycardia to 110s, tachypnea to upper 20s, new leukocytosis, CXR with increased interstitial and airspace opacities concerning for infection vs. Edema. Oxygen requirements appear to be at baseline. Of note, patient's UTI was notable for bacteriuria and grew E.coli on day 2. Her cough resolved immediately after starting Zosyn, and patient returned to her active baseline with good appetite and cooperating with RN cares. She was discharged with an oral regimen for cefpodoxime 200 mg daily x 4  days for a total case of urosepsis. Patient will be discharged with antibiotics and resumption of sodium bicarbonate tablets, and to continue her treatment at the ARU.      Acute normocytic anemia due to GI bleed - Patient with normal hemoglobin in January 2021, had a recent GI bleed s/p EGD and flex sig. Though the source of the bleed was treated, she is recovering her hemoglobin count slowly. No active bleed noted during her hospitalization. Patient was discharged with plan to restart omeprazole 20 mg BID.     Hypokalemia - Resolved after electrolyte repletion.    DMII- BGs trending 100s-170s, A1c 2/21 of 9.2%. Previously on metformin, now held in setting of lactic acidosis above. Will restart at the time of discharge.       CAD  HTN  S/p PCI 02/03/2021, will continue ticagrelor, ASA, and metoprololr tartrate at PTA doses.       Hx of Prolonged QTc -Avoiding QT-prolonging meds as able.       Consultations This Hospital Stay   MEDICATION HISTORY IP PHARMACY CONSULT  CARE MANAGEMENT / SOCIAL WORK IP CONSULT  PHYSICAL THERAPY ADULT IP CONSULT  OCCUPATIONAL THERAPY ADULT IP CONSULT  PHYSICAL THERAPY ADULT IP CONSULT  OCCUPATIONAL THERAPY ADULT IP CONSULT    Code Status   Full Code       The patient was discussed with Dr. Joanna FULLER MD  [address] [place] [id number]  Phone: [phone]  ______________________________________________________________________    Physical Exam   Vital Signs: Temp: 97.7  F (36.5  C) Temp src: Oral BP: 108/67 Pulse: 90   Resp: 20 SpO2: 99 % O2 Device: None (Room air)    Weight: 125 lbs 3.54 oz  General Appearance: Lying in bed, cooperative, NAD.  Respiratory: CTAB posteriorly, speaking in full sentences on room air.  Cardiovascular: Normal rate, regular rhythm. No m/r/g.  GI: Soft, non tender and non distended.  Skin: Warm, dry. No visible skin rash.  Other: Normal speech, no FND. No clubbing or  cyanosis. No pedal edema.       Primary Care Physician   Edwardo Garcia    Discharge Orders      General info for SNF    Length of Stay Estimate: Short Term Care: Estimated # of Days <30  Condition at Discharge: Improving  Level of care:skilled   Rehabilitation Potential: Good  Admission H&P remains valid and up-to-date: Yes  Recent Chemotherapy: N/A  Use Nursing Home Standing Orders: N/A     Reason for your hospital stay    You were hospitalized for a urinary tract infection and concerns for possible sepsis--otherwise known as a bloodstream infection. You were given antibiotics and improved significantly. Your urine cultures showed growth of a bacteria known as E.coli, which is likely the cause of all of your symptoms. You were discharged on oral antibiotics to take to complete treatment of the infection.     Activity - Up ad simon     Follow Up and recommended labs and tests    Follow up with jail physician.  The following labs/tests are recommended: CMP, CBC.     Physical Therapy Adult Consult    Evaluate and treat as clinically indicated.    Reason:  Patient to resume her PT exercises at ARU     Occupational Therapy Adult Consult    Evaluate and treat as clinically indicated.    Reason:  Patient to continue her OT exercises at [facility] prior to her transfer to [facility].     Advance Diet as Tolerated    Follow this diet upon discharge: Orders Placed This Encounter      Low Saturated Fat Na <2400 mg       Significant Results and Procedures   Most Recent 3 CBC's:  Recent Labs   Lab Test 03/30/21  0604 03/29/21  0455 03/28/21  1754   WBC 8.5 9.7 12.4*   HGB 7.3* 7.5* 9.2*    102* 99    335 388     Most Recent 3 BMP's:  Recent Labs   Lab Test 03/30/21  1319 03/30/21  0604 03/29/21  0455 03/28/21  1754   NA  --  141 137 136   POTASSIUM 3.7 3.0* 3.5 3.7   CHLORIDE  --  108 105 102   CO2  --  23 21 18*   BUN  --  21 25 28   CR  --  3.20* 3.35* 3.55*   ANIONGAP  --  10 10 16*   LAURA  --  7.9* 8.0* 8.2*   GLC  --  76  94 172*     Most Recent 2 LFT's:  Recent Labs   Lab Test 03/30/21  0604 03/28/21  1754   AST 50* 45   ALT 25 25   ALKPHOS 114 127   BILITOTAL 0.6 0.5     Most Recent 3 INR's:No lab results found.    Discharge Medications   Discharge Medication List as of 3/30/2021  2:57 PM      START taking these medications    Details   cefpodoxime (VANTIN) 200 MG tablet Take 1 tablet (200 mg) by mouth daily, Disp-4 tablet, R-0, Transitional         CONTINUE these medications which have NOT CHANGED    Details   acetaminophen (TYLENOL) 325 MG tablet Take 2 tablets (650 mg) by mouth every 6 hours as needed for mild pain, Transitional      aspirin (ASA) 81 MG chewable tablet Take 81 mg by mouth daily, Historical      bisacodyl (DULCOLAX) 10 MG suppository Place 1 suppository (10 mg) rectally daily as needed for constipation, Transitional      calcium polycarbophil (FIBERCON) 625 MG tablet Take 2 tablets by mouth daily, Historical      ferrous sulfate (FEROSUL) 325 (65 Fe) MG tablet Take 325 mg by mouth daily (with breakfast), Historical      guaiFENesin-dextromethorphan (ROBITUSSIN DM) 100-10 MG/5ML syrup Take 5 mLs by mouth every 4 hours as needed for cough, Historical      !! insulin aspart (NOVOLOG PEN) 100 UNIT/ML pen Inject 1-7 Units Subcutaneous 3 times daily (before meals), Transitional      !! insulin aspart (NOVOLOG PEN) 100 UNIT/ML pen Inject 1-5 Units Subcutaneous At Bedtime, Transitional      ipratropium - albuterol 0.5 mg/2.5 mg/3 mL (DUONEB) 0.5-2.5 (3) MG/3ML neb solution Take 1 vial (3 mLs) by nebulization 4 times daily, Transitional      magnesium oxide (MAG-OX) 400 MG tablet Take 400 mg by mouth 2 times daily, Historical      melatonin 3 MG tablet Take 1 tablet (3 mg) by mouth nightly as needed for sleep, Transitional      metoprolol tartrate (LOPRESSOR) 25 MG tablet Take 12.5 mg by mouth 2 times daily, Historical      multivitamin RENAL (RENAVITE RX/NEPHROVITE) 1 MG tablet Take 1 tablet by mouth daily, Historical       omeprazole (PRILOSEC) 40 MG DR capsule Take 40 mg by mouth 2 times daily , Historical      polyethylene glycol (MIRALAX) 17 g packet Take 17 g by mouth daily as needed for constipation, Transitional      prochlorperazine (COMPAZINE) 5 MG tablet Take 1 tablet (5 mg) by mouth every 8 hours as needed for nausea or vomiting, Transitional      senna-docusate (SENOKOT-S/PERICOLACE) 8.6-50 MG tablet Take 1 tablet by mouth At Bedtime, Transitional      sodium bicarbonate 650 MG tablet Take 650 mg by mouth 2 times daily, Historical      ticagrelor (BRILINTA) 90 MG tablet Take 90 mg by mouth 2 times daily, Historical       !! - Potential duplicate medications found. Please discuss with provider.        Allergies   Allergies   Allergen Reactions     Ace Inhibitors Cough       Physician Attestation      I, Jeannie FULLER MD, saw this patient with the resident and agree with Dr. Krishnamurthy's findings and plan of care as documented on the note. I personally reviewed vital signs, discharge medications, labs and imaging. Phone interpretor was used to communicate plan with the patient. Pt eager to get to get back to ARU and continue her rehab. She is discharge on cefpodoxime for E.coli UTI associated sepsis. Blood cultures are negative to date, UCx grew >100K e.coli and sensitivities are pending. Will closely follow labs after her discharge. Pt has prolonged qtc of 673 on 3/26, will need a repeat EKG in the future.     Jeannie FULLER MD  Date of Service (when I saw the patient): 03/30/21

## 2021-03-30 NOTE — PROGRESS NOTES
Care Management Discharge Note    Discharge Date: 03/30/21     Discharge Disposition: Return to Acute Rehab    [facility]  [address]  [place]  [address]  P: [phone]  F: [phone]    Discharge Services: None    Discharge DME: None    Discharge Transportation: [employer] ([phone]) wheelchair transport at 1545.    Private pay costs discussed: Not applicable    PAS Confirmation Code:  Not Needed    Education Provided on the Discharge Plan:  Yes    Persons Notified of Discharge Plans: Patient; Seamus Lara MD; charge nurse; bedside nurse; [facility] liaison    Patient/Family in Agreement with the Plan: Yes    External Handoff Completed: Yes    IMM Provided: Not Needed    BENSON Ness  7D Hematology/Oncology Social Worker  Phone: [phone]  Pager: [phone]  [email or web address]

## 2021-03-30 NOTE — PROGRESS NOTES
Nursing Focus: Discharge    D: Patient discharged to  ARU at 1345. Patient taken by [employer] transportation wheelchair.  All belongings sent with Breana.    I: Discharge prescriptions sent to discharge pharmacy to be filled. All discharge medications and instructions reviewed with pt. Sarath nava. Education completed. Care Plan goals met and adequate for discharge. Report given to  ARU RN.     A: Pt verbalized understanding of discharge medications and instructions.     P: Patient to continue rehab at ARU.

## 2021-03-30 NOTE — PLAN OF CARE
"/71 (BP Location: Left arm)   Pulse 80   Temp 98.6  F (37  C) (Oral)   Resp 18   Ht 1.499 m (4' 11\")   Wt 56.8 kg (125 lb 3.5 oz)   SpO2 97%   BMI 25.29 kg/m    Body mass index is 25.29 kg/m .   GENERAL: no apparent distress  EYES: Conjunctiva are not injected, no discharge.  EARS: Left TM -no erythema, no effusion,  not bulged.               Right TM -no erythema, no effusion,  not bulged.  NOSE: no discharge, no sinus tenderness  THROAT: no erythema, no exudate, no lesions  NECK: supple, no adenopathy.  CARDIAC: regular rate and rhythm, no murmur  RESP: clear, no wheezing, no rales, no rhonchi  ABD: soft, no distension, no tenderness  SKIN: No rashes    Tamiko Salinas RN    "

## 2021-03-30 NOTE — PLAN OF CARE
"0700- 1500    /73 (BP Location: Left arm)   Pulse 96   Temp 96.3  F (35.7  C) (Oral)   Resp 18   Ht 1.499 m (4' 11\")   Wt 56.8 kg (125 lb 3.5 oz)   SpO2 92%   BMI 25.29 kg/m      VSS    Reason for admission: Sepsis  Activity: Up w/ Assist x 1  Pain: Denies throughout shift  Neuro: WNL  Cardiac: WNL  Respiratory: Lung sounds with mild wheezing noted around 1000 and later cleared the rest of the shift. Infrequent cough, productive. Duoneb PRN x1 in AM  GI/: WNL; incontinent of stool x2  Diet: Low saturated fat/sodium. Sliding scale. Novolog held today due to BGs WNL  Lines: Hickmann- Right chest- Heparin Locked; Zosyn infusion x2 during shift. Dressing changed and caps changed today. Scant amount of bleeding from a wound under new dressing noted.   Wounds: WNL  Labs/imaging:   Potassium replaced for 3.0 and rechecked was 3.7.   Lactic Acid- 1.0      New changes this shift: No change     Plan: Continue to monitor and follow POC        "

## 2021-03-30 NOTE — INTERIM SUMMARY
[facility] Acute Rehab [facility] Pre-Admission Screen    Referral Source:  [facility] [address] [facility] [address]  Admit date to referring facility: 3/28/2021    Physical Medicine and Rehab Consult Completed: No    Rehab Diagnosis:    Neurologic Conditions 03.9 Other Neurologic: Severe rhabdomyolysis and prolonged hospitalization    Justification for Acute Inpatient Rehabilitation  Breana Boudreaux is 61 year old female with history of CAD s/p PCI in Jan 2021, NSTEMI hospitalization 02/01-02/04, uncontrolled type 2 diabetes, hypertension, CKD stage 3, dyslipidemia, chronic low back pain, restless leg syndrome, and ascending aorta dilatation, who presented to [facility] on 02/21 with weakness and dyspnea. She was found to have SHYANNE, hyperkalemia, lactic acidosis, and rhabdomyolysis. She was dialyzed urgently, and was treated with broad spectrum antibiotic for septic shock. Work up for infection was negative, and antibiotics were discontinued. Rhabdo was presumed to be from statin. Her hospital course was complicated with nose bleeding and lower GI bleeding in the setting of thrombocytopenia, uremia, and Dual antiplatelet agent use. GI evaluated her, and she had flexible sigmoidoscopy and RBC tagged scan, which were negative. The patient was transferred to [facility] on 3/12/2021 for ongoing dialysis needs, and severe physical deconditioning. Dialysis has been on hold since 03/13 for large urine output, and aggressive diuresis and was determined to no longer be indicated. She was deemed medically stable and admitted to Acute rehab 3/26. She was participating in therapy.  She was transferred to [facility] 3/28 for elevated lactate, tachypnea, tachycardia with concern for sepsis. CXR with increased interstitial and airspace opacities concerning for infection vs. Edema. Overall high clinical suspicion for pneumonia as driving etiology. Patient was started on IV zosyn. She is now medically ready to return  back to acute rehab.     Patient requires an intensive inpatient rehab program to address the following acute impairments:impaired cardiopulmonary response to activity, decreased activity tolerance, needs swallow further evaluated,impaired strength, impaired activity tolerance, impaired balance and impaired weight shifting. These impairments are impacting their functional independence and safety w/ transfers, gait, stairs, ADLs, and IADLs.     Current Active Medical Management Needs/Risks for Clinical Complications  The patient requires the high level of rehabilitation physician supervision that accompanies the provision of intensive rehabilitation therapy.  The patient needs the services of the rehabilitation physician to assess the patient medically and functionally and to modify the course of treatment as needed to maximize the patient's capacity to benefit from the rehabilitation process. She requires continued medical management an assessment of :  Healthcare-acquired pneumonia: IV zosyn (renally dosed) thru 4/4,No IV fluids ordered, low threshold to start, monitor labs.   DMII: BGs trending 100s-170s, A1c 2/21 of 9.2%. Previously on metformin, now held in setting of lactic acidosis, Low-dose sliding scale insulin.  HTN/CAD: Brilinta, metoprolol tartrate, s/p stent 2/3/21 and high risk of restenosis, on DAPT until 2/2022, no longer on statin;  Acute kidney injury - manage kidney function and volume in setting of recovering kidney function and need for HD from 2/23 - 3/13. pt was on statin at admission thought to have resulted in rhabdo, no longer on  Electrolytes: Fibercon, magnesium oxide, sodium bicarbonate  Sleep optimization: melatonin  Assess nausea: Compazine, triggered more with constipation, uremia and medications  Monitor anemia: last transusion 3/2 for lower GI bleed and SHYANNE: ferosul  Pt is at risk for falls with injury and DVT's    Past Medical/Surgical History  Surgery in the past 100 days:  No  Additional relevant past medical history: CKD stage III, chronic low back pain, restless leg syndrome, pt was on high dose statin-metorprolol and DAPT for CAD, stent placed 1/2021.     Level of Functioning Prior to Admission:    LIVING ENVIRONMENT  People in home: spouse and adult son  Current Living Arrangements:    Home Accessibility: 1 DAYLIN, has a walk in shower   Number of Stairs, Main Entrance: 1STE   Living Environment Comments: Son can be home during the day to A prn    Transportation Anticipated: family      SELF-CARE  Equipment Currently Used at Home: intermittent use of SPC, shower chair;grab bar, tub/shower    DISABILITY/FUNCTION  Concentrating, Remembering or Making Decisions Difficulty: no  Difficulty Communicating: no(Hmoung speaking)  Difficulty Eating/Swallowing: no  Walking or Climbing Stairs Difficulty: mod I  Walking or Climbing Stairs: mod I  Dressing/Bathing Difficulty: mod I  Dressing/Bathing:mod I  Toileting issues: no  Toileting Assistance: no  Doing Errands Independently Difficulty (such as shopping): yes  Fall history within last six months: no;        Additional Comments: Prior to admit she was IND with self cares and IADL and intermittently used of SPC.  She was on ST disability due to back pain. She likes to cook and garden. Son works from home and is available 24/7.     Level of Function: GG Scale (Section GG Functional Ability and Goals; CMS's LEO Version 3.0 Manual effective 10.1.2019):  PT Current Function Goals for Rehab   Bed Rolling 4 Supervision or touching assitance 6 Independent   Supine to Sit 4 Supervision or touching assitance 6 Independent   Sit to Stand 3 Partial/moderate assistance 6 Independent   Transfer 3 Partial/moderate assistance 6 Independent   Ambulation 1 Dependent 6 Independent   Stairs 4 Supervision or touching assitance 6 Independent     OT Current Function Goals for Rehab   Feeding 5 Setup or clean-up assistance 6 Independent   Grooming 4 Supervision or  touching assitance 6 Independent   Bathing 3 Partial/moderate assistance 6 Independent   Upper Body Dressing 3 Partial/moderate assistance 6 Independent   Lower Body Dressing 2 Substantial/maximal assistance 6 Independent   Toileting 3 Partial/moderate assistance 6 Independent   Toilet Transfer 3 Partial/moderate assistance 6 Independent   Tub/Shower Transfer 3 Partial/moderate assistance 6 Independent   Cognition Not Impaired Not applicable     SLP Current Function Goals for Rehab   Swallow Needs further assessment via FVSS Tolerate least restrictive diet without signs & symptoms of aspiration and adhere to safe swallow strategies   Communication Not Impaired Not applicable       Current Diet:  Regular diet and Thin liquids- Low Saturated Fat Na <2400mg    Summary Statement:  She completes shower and toilet transfers with MIN A. She requires MIN A for bathing seated, MAX A for LB dressing and min A for UB dressing. She completes ADL's seated due to poor standing tolerance and back pain. She completes bed Mobility: modA supine to sit; SBA sit to supine, maxA to reposition in bed. She ambulates 60-80' with FWW and wheelchair follow. She demonstrates increased trunk sway and proximal weakness. Balance has not been formally assessed however she presents with a fall risk due to slow gait speed and proximal weakness. Speech therapy completed Bedside swallow study 3/28 and a VFSS with an esophagram was recommended to r/o possible aspiration or esophageal dysphagia issues. Examination should be completed at Acute rehab.     Expected Therapies and Services Required During Inpatient Rehab Admission  Intensity of Therapy: Patient requires intensive therapies not available in a lesser level of care. Patient is motivated, making gains, and can tolerate 3 hours of therapy a day.  Physical Therapy: 60 minutes per day 7 days a week for 3 days and then when speech therapy is completed she will have 90 minutes per day for 10 days  for a total of 13 days at acute rehab.   Occupational Therapy: 60 minutes per day 7 days a week for 3 days and then when speech therapy is completed she will have 90 minutes per day for 10 days for a total of 13 days at acute rehab.  Speech and Language Therapy: 60 minutes per day, 7 days a week for 3 days  Rehabilitation Nursing Needs: Patient requires 24 hour Rehab Nursing to manage vitals, medication education, carryover of new rehab techniques, care coordination, skin integrity, blood sugar management, assess neurologic status, pain management and provide safe environment for patient at falls risk.    Precautions/restrictions/special needs:   Precautions: fall precautions   Restrictions: none   Special Needs:  needs,    Patient's designated visitor will be her spouse, Elezaargeorge     Expected Level of Improvement: Pt will achieve a level of mod IND w/ transfers, gait, stairs and ADLs using appropriate adaptive equipment. Expected Length of time to achieve: 13 days    Anticipated Discharge Needs:  Anticipated Discharge Destination: Home  Anticipated Discharge Support: Family member  24/7 support available : Yes  Identified caregiver(s):  Spouse and son  Anticipated Discharge Needs: Home with outpatient therapy    Identified challenges/barriers:  None    Rehab Admission Liaison:    Physician statement of review and agreement:  I have reviewed and am in agreement of the need for IRF stay to address above functional and medical needs. In addition to above statements address, Patient requires intensive active and ongoing therapeutic intervention and multiple therapies; Patient requires medical supervision; Expected to actively participate in the intensive rehab program; Sufficiently stable to actively participate; Expectation for measurable improvement in functional capacity or adaption to impairments.    Physician Signature/Date/Time:

## 2021-03-30 NOTE — H&P
[facility]   Acute Rehabilitation Unit  Admission History and Physical    CHIEF COMPLAINT   Generalized weakness     HISTORY OF PRESENT ILLNESS  Breana Boudreaux is a 61 year old Hmong speaking female with PMH significant for DMII, CKD III, RLS, and CAD s/p PCI to LAD 1/26/21 who initially presented to the ED 2/1 with NSTEMI s/p coronary angiogram with 3 stents placed on 2/3/21. Discharged home 2/4 on ASA, brilinta, and Crestor 40 mg daily. She represented to [facility] on 2/21/21 with dyspnea, found to have SHYANNE, lactic acidosis, transaminitis, and rhabdomyolysis. Eventually admitted to ARU on 3/26/21; see that H&P for more details about acute hospitalization.    While at ARU patient developed worsening dry cough, SOB, and tachycardia. Triggered sepsis protocol with lactate of 4.5 and 5.4. Ultimately transferred to [facility] for ongoing evaluation and management. CXR demonstrated increased interstitial and airspace opacities concerning for infection vs edema. Urine notable for bacteruria and grew E. Coli on day 2. Good improvement with zosyn. Symptoms attributed to urosepsis. After her short acute hospitalization, it was felt patient would benefit from returning for ongoing therapies in the acute inpatient rehabilitation setting.    Currently, the patient is medically appropriate and is assessed to have needs and will benefit from an inpatient acute rehabilitation comprehensive program working with PT, OT and SLP, and will also benefit from supervision and management of Rehab Nursing and Rehab MD.     PAST MEDICAL HISTORY  Past Medical History:   Diagnosis Date     Diabetes (H)      Hypertension    NSTEMI  CAD  Chronic back pain  RLS- previously on requip  DM type 2  HTN    SURGICAL HISTORY  Past Surgical History:   Procedure Laterality Date     CARDIAC SURGERY       SOCIAL HISTORY (Per 3/26 H&P)  Marital Status: marred  Living situation: lives with spouse 1 cathy  Family support:  supportive spouse and children  Vocational History: not working  Tobacco use: none  Alcohol use: none  Illicit drug use: none  Social History     Socioeconomic History     Marital status:      Spouse name: Not on file     Number of children: Not on file     Years of education: Not on file     Highest education level: Not on file   Occupational History     Not on file   Social Needs     Financial resource strain: Not on file     Food insecurity     Worry: Not on file     Inability: Not on file     Transportation needs     Medical: Not on file     Non-medical: Not on file   Tobacco Use     Smoking status: Never Smoker     Smokeless tobacco: Never Used   Substance and Sexual Activity     Alcohol use: Never     Frequency: Never     Drug use: Never     Sexual activity: Not on file   Lifestyle     Physical activity     Days per week: Not on file     Minutes per session: Not on file     Stress: Not on file   Relationships     Social connections     Talks on phone: Not on file     Gets together: Not on file     Attends Congregational service: Not on file     Active member of club or organization: Not on file     Attends meetings of clubs or organizations: Not on file     Relationship status: Not on file     Intimate partner violence     Fear of current or ex partner: Not on file     Emotionally abused: Not on file     Physically abused: Not on file     Forced sexual activity: Not on file   Other Topics Concern     Not on file   Social History Narrative     Not on file     FAMILY HISTORY  No family history on file.      PRIOR FUNCTIONAL HISTORY   Pt was independent with all ADLs/IADLs, transfers, mobility and gait.      MEDICATIONS  Medications Prior to Admission   Medication Sig Dispense Refill Last Dose     aspirin (ASA) 81 MG chewable tablet Take 81 mg by mouth daily        calcium polycarbophil (FIBERCON) 625 MG tablet Take 2 tablets by mouth daily        ferrous sulfate (FEROSUL) 325 (65 Fe) MG tablet Take 325 mg by  mouth daily (with breakfast)        guaiFENesin-dextromethorphan (ROBITUSSIN DM) 100-10 MG/5ML syrup Take 5 mLs by mouth every 4 hours as needed for cough        insulin aspart (NOVOLOG PEN) 100 UNIT/ML pen Inject 1-7 Units Subcutaneous 3 times daily (before meals) (Patient taking differently: Inject 1-7 Units Subcutaneous 3 times daily (before meals) Do Not give Correction Insulin if Pre-Meal BG less than 140.   For Pre-Meal  - 189 give 1 unit.   For Pre-Meal  - 239 give 2 units.   For Pre-Meal  - 289 give 3 units.   For Pre-Meal  - 339 give 4 units.   For Pre-Meal - 399 give 5 units.   For Pre-Meal -449 give 6 units  For Pre-Meal BG greater than or equal to 450 give 7 units.   To be given with prandial insulin, and based on pre-meal blood glucose.    Notify provider if glucose greater than or equal to 350 mg/dL after administration of correction dose.  If given at mealtime, administer within 30 minutes of start of meal)        ipratropium - albuterol 0.5 mg/2.5 mg/3 mL (DUONEB) 0.5-2.5 (3) MG/3ML neb solution Take 1 vial (3 mLs) by nebulization 4 times daily        magnesium oxide (MAG-OX) 400 MG tablet Take 400 mg by mouth 2 times daily        metoprolol tartrate (LOPRESSOR) 25 MG tablet Take 12.5 mg by mouth 2 times daily        multivitamin RENAL (RENAVITE RX/NEPHROVITE) 1 MG tablet Take 1 tablet by mouth daily        omeprazole (PRILOSEC) 40 MG DR capsule Take 40 mg by mouth 2 times daily         polyethylene glycol (MIRALAX) 17 g packet Take 17 g by mouth daily as needed for constipation        prochlorperazine (COMPAZINE) 5 MG tablet Take 1 tablet (5 mg) by mouth every 8 hours as needed for nausea or vomiting        senna-docusate (SENOKOT-S/PERICOLACE) 8.6-50 MG tablet Take 1 tablet by mouth At Bedtime        sodium bicarbonate 650 MG tablet Take 650 mg by mouth 2 times daily        ticagrelor (BRILINTA) 90 MG tablet Take 90 mg by mouth 2 times daily        acetaminophen  "(TYLENOL) 325 MG tablet Take 2 tablets (650 mg) by mouth every 6 hours as needed for mild pain        bisacodyl (DULCOLAX) 10 MG suppository Place 1 suppository (10 mg) rectally daily as needed for constipation        insulin aspart (NOVOLOG PEN) 100 UNIT/ML pen Inject 1-5 Units Subcutaneous At Bedtime (Patient taking differently: Inject 1-5 Units Subcutaneous At Bedtime Do Not give Bedtime Correction Insulin if BG less than  200.   For  - 249 give 1 units.   For  - 299 give 2 units.   For  - 349 give 3 units.   For  -399 give 4 units.   For BG greater than or equal to 400 give 5 units.  Notify provider if glucose greater than or equal to 350 mg/dL after administration of correction dose.)        melatonin 3 MG tablet Take 1 tablet (3 mg) by mouth nightly as needed for sleep          ALLERGIES     Allergies   Allergen Reactions     Ace Inhibitors Cough     REVIEW OF SYSTEMS  A 10 point ROS was performed and negative unless otherwise noted in HPI.     PHYSICAL EXAM  VITAL SIGNS:  There were no vitals taken for this visit.  BMI:  Estimated body mass index is 25.29 kg/m  as calculated from the following:    Height as of 3/28/21: 1.499 m (4' 11\").    Weight as of 3/29/21: 56.8 kg (125 lb 3.5 oz).     General: NAD, pleasant and cooperative  HEENT: ATNC, oropharynx clear with MMM   Pulmonary: clear breath sounds b/l, no rales/wheezing   Cardiovascular: RRR, no murmur  Abdominal: soft, non-tender, non-distended  Extremities: warm, well perfused, no edema in bilateral lower extremities, no tenderness in calves  MSK/neuro:   Mental Status:  alert and oriented   Cranial Nerves: grossly normal   Sensory: Normal to light touch in bilateral upper and lower extremities    Strength: 4+/5 in all muscle groups of the upper and lower extremities    Reflexes: Present and symmetrical   Babinski reflex: downgoing bilaterally   Abnormal movements: None   Speech: Intelligible per    Cognition: Overall " logic linear and goal directed   Gait: Deferred    Skin: Visible skin intact      LABS  Lab Results   Component Value Date    WBC 8.5 03/30/2021    HGB 7.3 (L) 03/30/2021    HCT 24.1 (L) 03/30/2021     03/30/2021     03/30/2021     Lab Results   Component Value Date     03/30/2021    POTASSIUM 3.0 (L) 03/30/2021    CHLORIDE 108 03/30/2021    CO2 23 03/30/2021    GLC 76 03/30/2021     Lab Results   Component Value Date    GFRESTIMATED 15 (L) 03/30/2021    GFRESTBLACK 17 (L) 03/30/2021     Lab Results   Component Value Date    AST 50 (H) 03/30/2021    ALT 25 03/30/2021    ALKPHOS 114 03/30/2021    BILITOTAL 0.6 03/30/2021     No results found for: INR  Lab Results   Component Value Date    BUN 21 03/30/2021    CR 3.20 (H) 03/30/2021     RADS  CXR 3/28/21:  IMPRESSION:  Increased left retrocardiac interstitial and airspace opacities likely representing atelectasis versus infection or pulmonary edema.    ASSESSMENT/PLAN:  Breana Boudreaux is a 61 year old woman with CAD s/p recent stenting 2/3/21, DM type 2, HTN, RLS, who presented 2/21/21 with shortness of breath found to be in SHYANNE with lactic acidosis, rhabdomyolysis, with other electrolyte abnormalities was started on dialysis, hospital stay complicated by GI bleed, UTI, thrombocytopenia,  discharged to LTAC 3/12 last dialysis 3/13. Admitted to acute rehab 3/26/21. Briefly discharged back to acute hospital 3/28-3/30, then readmitted to ARU on 3/30/21.     Admission to acute inpatient rehab rhabdomyolysis  Impairment group code: 03.8     1. PT, OT 90 minutes of each on a daily basis, in addition to rehab nursing and close management of physiatrist.       2. Impairment of ADL's: Noted to have impaired strength, impaired activity tolerance, leading to decreased ability to independently complete ADL's.  Will benefit from ongoing OT with goal for MOD I with basic ADLs.      3. Impairment of mobility:  Noted to have impaired strength, impaired  activity tolerance, and impaired balance leading to decreased mobility.  Will benefit from ongoing PT with goal for CHERYL with basic mobility.      4. Medical Conditions  Acute Kidney Injury  Lactic acidosis-   Rhabdomyolysis  Presented 2/23/21 with SHYANNE, lactic acidosis, rhabdo CK 80,000 requiring HD felt to be secondary to statin. Last HD 3/13  -trend weights, intake/output  -trend BMP  -continue to hold statin  -continue to hold metformin  -started on sodium bicarb 650 bid 3/26 per discharging facility    Urosepsis  Possible Healthcare Acquired Pneumonia  - Cefpodoxime 200 mg qDay x4 days     CAD- s/p PCI 2/3/21 on brilinta and asa.  -continue dual antiplatelet therapy  -metoprolol 12.5 mg bid dose reduced 3/26     HTN- with hypotension during LTAC stay with metoprolol dose reduced 3/26.   -monitor BP  -continue metoprolol 12.5 mg bid     Nausea/vomiting- reports ongoing since hospitalization 2/21 told was in setting of renal failure though has not improved per her report. Last QTc 3/16 486.    -ppi as above  -encourage intake  -prn compazine     QTc- mildly prolonged 486 3/16  -routine EKG eval Qtc-      Hypomagnesemia- continue mag oxide  -Monitor     Recent GI bleed- with edg and flex sig 2/22/21 with resolution.   -monitor  -continue ppi bid     Anemia- in setting of critical illness, renal failure.  Hgb 7.3 3/30  -continue iron supplement daily  -trend hgb     Leucocytosis-  WBC 8.5 3/30  -trend  -monitor for signs/symptoms of infection     DM type 2- Hgb A1c 2/21/21 9.2% previously on metformin, lantus, metformin held in setting of recent lactic acidosis. bg overall stable on ssi, though intake reported as poor due to nausea/ vomiting  -check bg qid  -ssi- will taper off or add long acting pending bg levels during rehab stay.       5. Adjustment to disability:  Clinical psychology to eval and treat as indicated  6. FEN: regular- cardiac- though appetite poor and n/v  7. Bowel: PRN meds  8. Bladder: monitor as  recovering renal failure  9. DVT Prophylaxis: mechanical  10. GI Prophylaxis: ppi bid  11. Code: full  12. Disposition: home  13. ELOS:  ~10 days.  14. Rehab prognosis:  good  15. Follow up Appointments on Discharge: pcp, cardiology    Discussed and reviewed with Dr. Roro eBlla MD  PGY-3, [facility] PM&R Residency  Pager #[phone]

## 2021-03-31 ENCOUNTER — APPOINTMENT (OUTPATIENT)
Dept: OCCUPATIONAL THERAPY | Facility: CLINIC | Age: 61
End: 2021-03-31
Payer: COMMERCIAL

## 2021-03-31 ENCOUNTER — APPOINTMENT (OUTPATIENT)
Dept: PHYSICAL THERAPY | Facility: CLINIC | Age: 61
End: 2021-03-31
Payer: COMMERCIAL

## 2021-03-31 ENCOUNTER — APPOINTMENT (OUTPATIENT)
Dept: SPEECH THERAPY | Facility: CLINIC | Age: 61
End: 2021-03-31
Payer: COMMERCIAL

## 2021-03-31 LAB
GLUCOSE BLDC GLUCOMTR-MCNC: 126 MG/DL (ref 70–99)
GLUCOSE BLDC GLUCOMTR-MCNC: 133 MG/DL (ref 70–99)
GLUCOSE BLDC GLUCOMTR-MCNC: 81 MG/DL (ref 70–99)

## 2021-03-31 PROCEDURE — 250N000013 HC RX MED GY IP 250 OP 250 PS 637: Performed by: STUDENT IN AN ORGANIZED HEALTH CARE EDUCATION/TRAINING PROGRAM

## 2021-03-31 PROCEDURE — 97530 THERAPEUTIC ACTIVITIES: CPT | Mod: GP

## 2021-03-31 PROCEDURE — 97166 OT EVAL MOD COMPLEX 45 MIN: CPT | Mod: GO | Performed by: OCCUPATIONAL THERAPIST

## 2021-03-31 PROCEDURE — 128N000003 HC R&B REHAB

## 2021-03-31 PROCEDURE — 97535 SELF CARE MNGMENT TRAINING: CPT | Mod: GO | Performed by: OCCUPATIONAL THERAPIST

## 2021-03-31 PROCEDURE — 92610 EVALUATE SWALLOWING FUNCTION: CPT | Mod: GN

## 2021-03-31 PROCEDURE — 99223 1ST HOSP IP/OBS HIGH 75: CPT | Mod: GC | Performed by: PHYSICAL MEDICINE & REHABILITATION

## 2021-03-31 PROCEDURE — 97162 PT EVAL MOD COMPLEX 30 MIN: CPT | Mod: GP

## 2021-03-31 PROCEDURE — 999N001017 HC STATISTIC GLUCOSE BY METER IP

## 2021-03-31 RX ORDER — LIDOCAINE 4 G/G
1 PATCH TOPICAL
Status: DISCONTINUED | OUTPATIENT
Start: 2021-03-31 | End: 2021-04-07

## 2021-03-31 RX ADMIN — TICAGRELOR 90 MG: 90 TABLET ORAL at 20:18

## 2021-03-31 RX ADMIN — OMEPRAZOLE 20 MG: 20 CAPSULE, DELAYED RELEASE ORAL at 06:49

## 2021-03-31 RX ADMIN — Medication 12.5 MG: at 08:08

## 2021-03-31 RX ADMIN — CALCIUM POLYCARBOPHIL 1250 MG: 625 TABLET, FILM COATED ORAL at 08:07

## 2021-03-31 RX ADMIN — Medication 12.5 MG: at 20:18

## 2021-03-31 RX ADMIN — CEFPODOXIME PROXETIL 200 MG: 200 TABLET, FILM COATED ORAL at 08:08

## 2021-03-31 RX ADMIN — ACETAMINOPHEN 650 MG: 325 TABLET, FILM COATED ORAL at 12:09

## 2021-03-31 RX ADMIN — FERROUS SULFATE TAB 325 MG (65 MG ELEMENTAL FE) 325 MG: 325 (65 FE) TAB at 08:08

## 2021-03-31 RX ADMIN — OMEPRAZOLE 20 MG: 20 CAPSULE, DELAYED RELEASE ORAL at 17:34

## 2021-03-31 RX ADMIN — ASPIRIN 81 MG CHEWABLE TABLET 81 MG: 81 TABLET CHEWABLE at 08:07

## 2021-03-31 RX ADMIN — ACETAMINOPHEN 650 MG: 325 TABLET, FILM COATED ORAL at 02:12

## 2021-03-31 RX ADMIN — TICAGRELOR 90 MG: 90 TABLET ORAL at 08:08

## 2021-03-31 RX ADMIN — SODIUM BICARBONATE 650 MG TABLET 650 MG: at 11:26

## 2021-03-31 RX ADMIN — SODIUM BICARBONATE 650 MG TABLET 650 MG: at 17:35

## 2021-03-31 RX ADMIN — Medication 1 TABLET: at 08:07

## 2021-03-31 RX ADMIN — MAGNESIUM OXIDE TAB 400 MG (241.3 MG ELEMENTAL MG) 400 MG: 400 (241.3 MG) TAB at 11:25

## 2021-03-31 RX ADMIN — MAGNESIUM OXIDE TAB 400 MG (241.3 MG ELEMENTAL MG) 400 MG: 400 (241.3 MG) TAB at 17:35

## 2021-03-31 NOTE — PROGRESS NOTES
"  [facility]   Acute Rehabilitation Unit  Daily progress note    INTERVAL HISTORY  No acute overnight events. Was noted to have episodes of incontinence including multiple loose stools. Also had higher PVR so still monitoring. Having ongoing nausea which the medication is not adequately controlling. Otherwise denies pain, CP, SOB.    Function:  Bed Mobility: MOD A at BLE in/out of bed  Transfer: stand pivot with WW, CGA  Gait: 20 ft with WW, CGA  Stairs: Not yet tested  Balance: Needs WW support during ambulation    MEDICATIONS  Scheduled meds    aspirin  81 mg Oral Daily     calcium polycarbophil  1,250 mg Oral Daily     cefpodoxime  200 mg Oral Daily     ferrous sulfate  325 mg Oral Daily with breakfast     insulin aspart  1-3 Units Subcutaneous TID AC     insulin aspart  1-3 Units Subcutaneous At Bedtime     magnesium oxide  400 mg Oral BID     metoprolol tartrate  12.5 mg Oral BID     multivitamin RENAL  1 tablet Oral Daily     omeprazole  20 mg Oral BID AC     sodium bicarbonate  650 mg Oral BID     ticagrelor  90 mg Oral BID     PRN meds:  acetaminophen, glucose **OR** dextrose **OR** glucagon, guaiFENesin-dextromethorphan, ipratropium - albuterol 0.5 mg/2.5 mg/3 mL, melatonin, polyethylene glycol, prochlorperazine, senna-docusate    PHYSICAL EXAM  /77 (BP Location: Left arm)   Pulse 86   Temp 98.4  F (36.9  C) (Oral)   Resp 20   Ht 1.499 m (4' 11\")   Wt 56 kg (123 lb 6.4 oz)   SpO2 95%   BMI 24.92 kg/m    Gen: Pleasant female, laying in bed, NAD  HEENT: Atraumatic  Cardio: RRR  Pulm: CTAB  Abd: Soft, NT, ND  Ext: No LE tenderness  Neuro/MSK: Speech clear and fluent per , moving limbs antigravity    LABS  No CBC/BMP in last 24 hours.    ASSESSMENT AND PLAN  Breana Boudreaux is a 61 year old woman with CAD s/p recent stenting 2/3/21, DM type 2, HTN, RLS, who presented 2/21/21 with shortness of breath found to be in SHYANNE with lactic acidosis, " rhabdomyolysis, with other electrolyte abnormalities was started on dialysis, hospital stay complicated by GI bleed, UTI, thrombocytopenia,  discharged to LTAC 3/12 last dialysis 3/13. Admitted to acute rehab 3/26/21. Briefly discharged back to acute hospital 3/28-3/30, then readmitted to ARU on 3/30/21.     Admission to acute inpatient rehab rhabdomyolysis  Impairment group code: 03.8     1. PT, OT 90 minutes of each on a daily basis, in addition to rehab nursing and close management of physiatrist.       2. Impairment of ADL's: Noted to have impaired strength, impaired activity tolerance, leading to decreased ability to independently complete ADL's.  Will benefit from ongoing OT with goal for MOD I with basic ADLs.      3. Impairment of mobility:  Noted to have impaired strength, impaired activity tolerance, and impaired balance leading to decreased mobility.  Will benefit from ongoing PT with goal for CHERYL with basic mobility.      4. Medical Conditions  Acute Kidney Injury  Lactic acidosis-   Rhabdomyolysis  Presented 2/23/21 with SHYANNE, lactic acidosis, rhabdo CK 80,000 requiring HD felt to be secondary to statin. Last HD 3/13  -trend weights, intake/output  -trend BMP  -continue to hold statin  -continue to hold metformin  -started on sodium bicarb 650 bid 3/26 per discharging facility     Urosepsis  Possible Healthcare Acquired Pneumonia  - Cefpodoxime 200 mg qDay x4 days     CAD- s/p PCI 2/3/21 on brilinta and asa.  -continue dual antiplatelet therapy  -metoprolol 12.5 mg bid dose reduced 3/26     HTN- with hypotension during LTAC stay with metoprolol dose reduced 3/26.   -monitor BP  -continue metoprolol 12.5 mg bid     Nausea/vomiting- reports ongoing since hospitalization 2/21 told was in setting of renal failure though has not improved per her report. Last QTc 3/16 486.    -ppi as above  -encourage intake  -prn compazine     QTc- mildly prolonged 486 3/16  -routine EKG eval Qtc-      Hypomagnesemia-  continue mag oxide  -Monitor     Recent GI bleed- with edg and flex sig 2/22/21 with resolution.   -monitor  -continue ppi bid     Anemia- in setting of critical illness, renal failure.  Hgb 7.3 3/30  -continue iron supplement daily  -trend hgb     Leucocytosis-  WBC 8.5 3/30  -trend  -monitor for signs/symptoms of infection     DM type 2- Hgb A1c 2/21/21 9.2% previously on metformin, lantus, metformin held in setting of recent lactic acidosis. bg overall stable on ssi, though intake reported as poor due to nausea/ vomiting  -check bg qid  -ssi- will taper off or add long acting pending bg levels during rehab stay.       5. Adjustment to disability:  Clinical psychology to eval and treat as indicated  6. FEN: regular- cardiac- though appetite poor and n/v  7. Bowel: PRN meds  8. Bladder: monitor as recovering renal failure  9. DVT Prophylaxis: mechanical  10. GI Prophylaxis: ppi bid  11. Code: full  12. Disposition: home  13. ELOS:  ~10 days.  14. Rehab prognosis:  good  15. Follow up Appointments on Discharge: pcp, cardiology    Darian Bella MD  PGY3 Physical Medicine & Rehabilitation

## 2021-03-31 NOTE — PLAN OF CARE
FOCUS/GOAL  Bowel management, Bladder management, Nutrition/Feeding/Swallowing precautions, Medication management, and Cognition/Memory/Judgment/Problem solving    ASSESSMENT, INTERVENTIONS AND CONTINUING PLAN FOR GOAL:  Pt is alert and oriented x4. Is primarily Hmong speaking, but can follow some basic directions in English.  Ipad used for higher level communication. Denies any pain, SOB, numbness/tingling. Some nausea around mealtime, given PRN compazine with little effect. Pt ate 25-50%. Incont of loose BM x2. Cont of bladder using toilet x2. Transfers CGA and walker. Using call light appropriately.

## 2021-03-31 NOTE — PLAN OF CARE
FOCUS/GOAL  Medical management    ASSESSMENT, INTERVENTIONS AND CONTINUING PLAN FOR GOAL:  Patient is stable. O2 sat on RA. BG before breakfast was 81 and 126 Lunch time. Patient did not eat, stated will eat later.. Assisted to the bathroom with A1 using bathroom, she voided. C/o of backpain. Tylenol given at 12:09 with decreased pain as result. Patient c/o that the pain is back per OT. New order received for Lidocain patch. Will continue with POC.

## 2021-03-31 NOTE — PLAN OF CARE
FOCUS/GOAL  Bowel management, Bladder management and Pain management    ASSESSMENT, INTERVENTIONS AND CONTINUING PLAN FOR GOAL:  Patient is alert/oriented, able to use call light appropriately for care needs.  Patient did report some back pain around 0200 and was given PRN tylenol per request. Patient at the start of the shift was calling for the bathroom hourly, voiding but also had small smear of stool and then a continent medium stool and then an incontinent loose stool x3.  Patient voided with each time as well, bladder scan is somewhat high, will need continued monitoring (225 after second void).  Patient denies discomfort r/t voiding and has been having adequate fluid intake per report. Patient did sleep more towards end of shift, no additional care concerns at this time, continue with POC.

## 2021-03-31 NOTE — PROGRESS NOTES
"   03/31/21 1000   Quick Adds   Type of Visit Initial PT Evaluation      Language [facility]   Living Environment   People in home spouse;child(daron), adult   Current Living Arrangements house   Home Accessibility stairs to enter home;stairs within home   Number of Stairs, Main Entrance 1   Stair Railings, Main Entrance none   Number of Stairs, Within Home, Primary none   Stair Railings, Within Home, Primary none   Transportation Anticipated family or friend will provide   Living Environment Comments Lives in house with  and adult children. One DAYLIN and then single level living inside. Has tub shower on main floor. Hard floors throughout.    Self-Care   Usual Activity Tolerance good   Current Activity Tolerance fair   Activity/Exercise/Self-Care Comment Was on short term disability from work d/t back pain. States she has a pinched nerve in her lower back. Endorses radiculopathy in BLE. Takes medication to cope with pain, moderately successful. Has considered surgery as well.    Disability/Function   Hearing Difficulty or Deaf yes   Patient's preferred means of communication    Use of hearing assistive devices bilateral hearing aids   Wear Glasses or Blind no   Concentrating, Remembering or Making Decisions Difficulty no   Difficulty Communicating no   Difficulty Eating/Swallowing no   Walking or Climbing Stairs Difficulty yes   Mobility Management short term disability   Dressing/Bathing Difficulty no   Toileting issues no   Doing Errands Independently Difficulty (such as shopping) no   Fall history within last six months no   General Information   Onset of Illness/Injury or Date of Surgery 02/01/21   Referring Physician Dr. Missy Read    Patient/Family Therapy Goals Statement (PT) \"I want to walk and take care of myself\"   Pertinent History of Current Problem (include personal factors and/or comorbidities that impact the POC) Per chart: 61 year old Hmong speaking female with PMH " significant for DMII, CKD III, RLS, and CAD s/p PCI to LAD 1/26/21 who initially presented to the ED 2/1 with NSTEMI s/p coronary angiogram with 3 stents placed on 2/3/21. Discharged home 2/4 on ASA, brilinta, and Crestor 40 mg daily. She represented to [facility] on 2/21/21 with dyspnea, found to have SHYANNE, lactic acidosis, transaminitis, and rhabdomyolysis. Eventually admitted to ARU on 3/26/21; see that H&P for more details about acute hospitalization.   Heart Disease Risk Factors Diabetes;High blood pressure;Lack of physical activity;Dislipidemia;Overweight;Stress;Family history;Medical history;Age   General Observations Fatigues easily   Cognition   Cognitive Status Comments No initial concerns   Pain Assessment   Patient Currently in Pain No   Integumentary/Edema   Integumentary/Edema no deficits were identifed   Posture    Posture Not impaired   Range of Motion (ROM)   ROM Comment All ROM WFL, stiffness in lower back from previous back injury   Strength   Strength Comments 3/5 in B hip flexion, adduction, and abduction. Otherwise 4+/5 in BLE.    ARC Assessment Only   Acute Rehab Functional Assessment See IP Rehab Daily Documentation Flowsheet for Functional Mobility/ADL Assessment   Balance   Balance Comments Able to free stand, but dynamic balance requires support of walker.    Sensory Examination   Sensory Perception Comments No deficits identified to light touch, proprioception, or deep pressure.    Coordination   Coordination no deficits were identified   Muscle Tone   Muscle Tone no deficits were identified   Clinical Impression   Criteria for Skilled Therapeutic Intervention yes, treatment indicated   PT Diagnosis (PT) deconditioning, difficulty walking   Influenced by the following impairments see clinical impression comments   Functional limitations due to impairments see clinical impression comments   Clinical Presentation Evolving/Changing   Clinical Presentation Rationale Moderately complex d/t level of  disability, but good family support, pre-existing back pain    Clinical Decision Making (Complexity) moderate complexity   Therapy Frequency (PT) Daily   Predicted Duration of Therapy Intervention (days/wks) 10 days   Planned Therapy Interventions (PT) balance training;bed mobility training;gait training;groups;home exercise program;transfer training;thermotherapy;neuromuscular re-education;patient/family education;ROM (range of motion);strengthening;stretching;stair training   Anticipated Equipment Needs at Discharge (PT) walker, rolling   Risk & Benefits of therapy have been explained evaluation/treatment results reviewed;care plan/treatment goals reviewed;risks/benefits reviewed;current/potential barriers reviewed;participants voiced agreement with care plan;participants included;patient   Clinical Impression Comments Pt is below baseline for functional mobility s/p prolonged hsopitalization. She presents with generalized weakness in hips and decreased endurance. Will benefit from ongoing IP PT on ARU x 10 days to achieve MOD I level of mobility in order to return to home.    Total Evaluation Time   Total Evaluation Time (Minutes) 20

## 2021-03-31 NOTE — PLAN OF CARE
"Discharge Planner Post-Acute Rehab SLP:     Discharge Plan: Home w/ likely no ongoing Speech needs    Precautions: None from SLP perspective    Current Status:  Communication: WNL; able to make wants, needs, & thoughts known. Speaks some English, but benefits from Hmong .  Cognition: Appears intact. Pt A&Ox4.  Swallow: Regular textures, thin liquids. Nausea impacting oral intake. No overt s/sx of aspiration. Suspect some esophageal dysphagia.    Assessment: Clinical Bedside Swallow evaluation completed per MD orders. Evaluation was rather limited due to patient's persistent nausea. No oral or pharyngeal dysphagia observed with thin liquid or pureed solids. Patient declined to trial a soft solid or regular solid due to nausea. No overt clinical s/s of aspiration observed with thin liquid or puree. Patient's report of food/liquid \"sticking\" in her throat/chest is suggestive of possible esophageal dysmotility issue(s), though her EGD in February was WNL. Recommend VFSS to rule out silent aspiration, as well as an esophagram to further assess for esophageal component to patient's dysphagia. These evaluations were originally planned for 3/29/21, but patient discharged acutely before these could be completed.    Other Barriers to Discharge (Family Training, etc): None from SLP perspective  "

## 2021-03-31 NOTE — CONSULTS
"Pt was recently admitted to ARU and quickly returned to the hospital for additional w/u. Pt returned to ARU yesterday 21. Original SW assessment copied below. No immediate needs at this time. Per pt chart, pt has [facility] PTA. SW will continue to follow and assist with coordinating discharge plans and any other resources.     Gpysy Kahn, [facility], [facility] Acute Rehab Unit   Phone: [phone]  I   Pager: [phone]    --------------------------------------------------------------------------------------------------------      21 1024   Living Arrangements   People in home child(daron), adult;spouse   CM/SW Discharge Planning   Anticipated Discharge Disposition (CM/SW) Home;Home Care   Final Resources   Equipment Currently Used at Home grab bar, tub/shower;shower chair    Social Work: Initial Assessment with Discharge Plan     Patient Name: Breana Mota \"May\" Janusz  : 1960  Age: 61 year old  MRN: [id number]  Completed assessment with: patient,   Admitted to ARU: 3/26/21     Presenting Information   Date of SW assessment: 3/27/21  Health Care Directive: none  Primary Health Care Agent: next-of-kin would be surrogate decision-maker if necessary  Secondary Health Care Agent: n/a  Living Situation: lives with  and 2 adult sons (Tchyneng & Xeng) in house  Previous Functional Status: independent prior to surgery/hospitalization in January   Patient and family understanding of hospitalization: yes  Cultural/Language/Spiritual Considerations: Hmong-speaking (), from [facility]     Physical Health  Reason for admission: 61-year-old Hmong speaking woman who presents to the acute inpatient rehabilitation unit for muscle weakness due to rhabdomyolysis and SHYANNE due to statins.  She has a history of NSTEMI in January with stent placements at which time she was placed on the statin medication.  Hospital course has been complicated by GI bleeding without an acute " source identified, thrombocytopenia, and the need for temporary dialysis due to her SHYANNE.  She was discharged to an LTACH on 3/12/2021 and now subsequently transferred to the acute inpatient rehabilitation unit.       Provider Information   Primary Care Physician: Edwardo Garcia     Mental Health/Chemical Dependency:   Diagnosis: no diagnosis  Alcohol/Tobacco/Narcotics: no problems  Support/Services in Place: none  Services Needed/Recommended: health psychologist available if interested during stay  Sexuality/Intimacy: hnot discussed; history of conflict in marriage ( sees other women)     Support System  Marital Status: ; -Briana (works full-time)  Family support: son-Chris (works full-time)  Son-Amos (works from home)  Other support available: daughter-[facility] Hospital[facility]     Community Resources  Current in home services: [facility]-home nurse & PT (saw several visits in Feb 2021)  Previous services: none     Financial/Employment/Education  Employment Status: disabled; unable to work most of 2020 but has been on short-term disability  Income Source: 's wages  Education: some school  Financial Concerns:  supports her financially  Insurance: [employer]     Discharge Plan   Patient and family discharge goal: return home with family & home care/therapy services  Provided education on discharge plan: familiar with home care services  Patient agreeable to discharge plan: to be determined  Provided education and attained signature for Medicare IM and IRF Patient Rights and Privacy Information provided to patient : n/a  Provided patient with [facility] [place] Resources: n/a  Barriers to discharge: none identified     Discharge Recommendations   Disposition: return home with family & home care/therapy services  Transportation Needs: family can provide  Name of Transportation Company and Phone: n/a     Additional comments   D:  See H&P for full medical  background.  I:  Met with patient to complete assessment and social work consult.  A:  Patient is doing okay, feels tired.  Supportive family who is involved.    P:  SW will follow and assist as needed.     Please invite to Care Conference:  Chris (son)  Amos (son)  Briana ()        SERGEI Garrett  Weekend   Phone: [phone]  Pager: [phone]

## 2021-03-31 NOTE — PROGRESS NOTES
03/31/21 1022   Quick Adds   Type of Visit Initial Occupational Therapy Evaluation       Present yes   Language Hmong   Living Environment   People in home child(daron), adult;spouse   Current Living Arrangements house   Home Accessibility stairs to enter home;stairs within home   Number of Stairs, Main Entrance 1   Stair Railings, Main Entrance none   Number of Stairs, Within Home, Primary none   Stair Railings, Within Home, Primary none   Transportation Anticipated family or friend will provide   Living Environment Comments OT: Pt lives in a single story house with  and adult children. One DAYLIN and then single level living inside. Has tub shower on main floor. Hard floors throughout. Pt has a leonila sized bed and gets out on the left side. pt's laundry is in the basement. Pt has grab bars near entry of tub and near toilet.    Self-Care   Usual Activity Tolerance good   Current Activity Tolerance fair   Regular Exercise Yes   Activity/Exercise Type running/jogging;other (see comments)  (stationary bike)   Exercise Amount/Frequency 3-5 times/wk   Equipment Currently Used at Home grab bar, toilet;grab bar, tub/shower   Activity/Exercise/Self-Care Comment OT: Per PT report, Was on short term disability from work d/t back pain. States she has a pinched nerve in her lower back. Endorses radiculopathy in BLE. Takes medication to cope with pain, moderately successful. Has considered surgery as well. Pt reports enjoying riding a stationary bike and running 3x a week. Pt enjoys gardening, and working   Disability/Function   Hearing Difficulty or Deaf yes   Patient's preferred means of communication    Describe hearing loss bilateral hearing loss   Use of hearing assistive devices bilateral hearing aids   Wear Glasses or Blind yes   Vision Management reading glasses   Concentrating, Remembering or Making Decisions Difficulty no   Difficulty Communicating no   Difficulty Eating/Swallowing  "no   Walking or Climbing Stairs Difficulty yes   Mobility Management short term disability    Dressing/Bathing Difficulty no   Toileting issues no   Doing Errands Independently Difficulty (such as shopping) no   Fall history within last six months no   Change in Functional Status Since Onset of Current Illness/Injury yes   General Information   Onset of Illness/Injury or Date of Surgery 01/26/21   Referring Physician Dr. Read    Patient/Family Therapy Goal Statement (OT) OT: \"To return to my prior level and gain IND\"   Additional Occupational Profile Info/Pertinent History of Current Problem OT: Per chart review, pt \"is a 61 year old Hmong speaking female with PMH significant for DMII, CKD III, RLS, and CAD s/p PCI to LAD 1/26/21 who initially presented to the ED 2/1 with NSTEMI s/p coronary angiogram with 3 stents placed on 2/3/21. Discharged home 2/4 on ASA, brilinta, and Crestor 40 mg daily. She represented to [place] on 2/21/21 with dyspnea, found to have SHYANNE, lactic acidosis, transaminitis, and rhabdomyolysis. Eventually admitted to ARU on 3/26/21; see that H&P for more details about acute hospitalization. While at ARU patient developed worsening dry cough, SOB, and tachycardia. Triggered sepsis protocol with lactate of 4.5 and 5.4. Ultimately transferred to [facility] for ongoing evaluation and management. CXR demonstrated increased interstitial and airspace opacities concerning for infection vs edema. Urine notable for bacteruria and grew E. Coli on day 2. Good improvement with zosyn. Symptoms attributed to urosepsis. After her short acute hospitalization, it was felt patient would benefit from returning for ongoing therapies in the acute inpatient rehabilitation setting.\"   Existing Precautions/Restrictions fall   Limitations/Impairments hearing   Heart Disease Risk Factors Diabetes;High blood pressure;Lack of physical activity;Dislipidemia;Overweight;Stress;Family history;Medical history;Age   Cognitive " Status Examination   Orientation Status person;time   Visual Perception   Impact of Vision Impairment on Function (Vision) OT: Pt wears reading glasses. Visual tracking and convergence intact   Sensory   Sensory Comments OT: Light touch intact with TED's   Pain Assessment   Patient Currently in Pain Yes, see Vital Sign flowsheet  (10/10 back pain)   Integumentary/Edema   Integumentary/Edema no deficits were identifed   Posture   Posture forward head position   Range of Motion Comprehensive   Comment, General Range of Motion OT: LINDAs WFL all planes   Strength Comprehensive (MMT)   Comment, General Manual Muscle Testing (MMT) Assessment OT: 3+/5 with sh. flex, sh. abd, elb. flex/ext, wrist flex/ext and    Hand Strength   Left hand  (pounds) 25   Right hand  (pounds) 28   Coordination   Left hand, nine hole peg test (seconds) 29   Right hand, nine hole peg test (seconds) 34    ARC Assessment Only   Acute Rehab Functional Assessment See IP Rehab Daily Documentation Flowsheet for Functional Mobility/ADL Assessment   Instrumental Activities of Daily Living (IADL)   Previous Responsibilities meal prep;laundry;shopping;work   IADL Comments OT: Pt has supportive spouse and sons who assisted with IADLs   Clinical Impression   Criteria for Skilled Therapeutic Interventions Met (OT) yes;meets criteria;skilled treatment is necessary   OT Diagnosis OT: Decreased safety and IND with ADLs/IADLs    OT Problem List-Impairments impacting ADL problems related to;activity tolerance impaired;balance;cognition;fear & anxiety;hearing;inability to direct their own care;mobility;motor control;strength;pain;postural control;other (see comments)   ADL comments/analysis OT: Impaired balance, impaired strength, debility, back pain, and impaired standing tolerance   Assessment of Occupational Performance 3-5 Performance Deficits   Identified Performance Deficits OT: Performance deficits include: dressing, toileting, G/H tasks,  bathing and IADLs   Planned Therapy Interventions (OT) ADL retraining;IADL retraining;balance training;bed mobility training;cognition;motor coordination training;strengthening;transfer training;home program guidelines;progressive activity/exercise;risk factor education;other (see comments)   Clinical Decision Making Complexity (OT) moderate complexity   Therapy Frequency (OT) Daily   Predicted Duration of Therapy 10 days   Anticipated Equipment Needs Upon Discharge (OT) bathing equipment;dressing equipment;toileting equipment   Risk & Benefits of therapy have been explained evaluation/treatment results reviewed;care plan/treatment goals reviewed;risks/benefits reviewed;current/potential barriers reviewed;participants voiced agreement with care plan;participants included;patient   Comment-Clinical Impression OT: Pt would benefit from skilled OT services to increase IND and safety with ADLs/IADLs and functional mobility.    Total Evaluation Time (Minutes)   Total Evaluation Time (Minutes) 20

## 2021-03-31 NOTE — PROGRESS NOTES
"   03/31/21 2124   General Information   Onset of Illness/Injury or Date of Surgery 02/21/21   Referring Physician Dr. Darian Bella   Patient/Family Therapy Goal Statement (SLP) Patient wishes that her nausea would resolve. Additionally, she wants to eat and drink without having food/liquid \"stick\" in her throat/chest.   Pertinent History of Current Problem Per H&P note: \"Breana Boudreaux is a 61 year old Hmong speaking female with PMH significant for DMII, CKD III, RLS, and CAD s/p PCI to LAD 1/26/21 who initially presented to the ED 2/1 with NSTEMI s/p coronary angiogram with 3 stents placed on 2/3/21. Discharged home 2/4 on ASA, brilinta, and Crestor 40 mg daily. She represented to [facility] on 2/21/21 with dyspnea, found to have SHYANNE, lactic acidosis, transaminitis, and rhabdomyolysis. Eventually admitted to ARU on 3/26/21; see that H&P for more details about acute hospitalization. While at ARU patient developed worsening dry cough, SOB, and tachycardia. Triggered sepsis protocol with lactate of 4.5 and 5.4. Ultimately transferred to [place] for ongoing evaluation and management. CXR demonstrated increased interstitial and airspace opacities concerning for infection vs edema. Urine notable for bacteruria and grew E. Coli on day 2. Good improvement with zosyn. Symptoms attributed to urosepsis. After her short acute hospitalization, it was felt patient would benefit from returning for ongoing therapies in the acute inpatient rehabilitation setting. Currently, the patient is medically appropriate and is assessed to have needs and will benefit from an inpatient acute rehabilitation comprehensive program working with PT, OT and SLP, and will also benefit from supervision and management of Rehab Nursing and Rehab MD.\" Patient is referred for swallow eval due to recent dyspnea and findings on CXR. Rule out aspiration as possible cause.   General Observations Patient pleasant and cooperative. Flat affect noted.    "    Present yes  (Via Language Line on Vocera)   Language Hmong   Type of Evaluation   Type of Evaluation Swallow Evaluation   Oral Motor   Oral Musculature anomalies present  (Mild left labial droop)   Dentition (Oral Motor)   Dentition (Oral Motor) natural dentition;adequate dentition  (Functionally intact. Missing some upper & lower molars.)   Facial Symmetry (Oral Motor)   Facial Symmetry (Oral Motor) left side impairment  (Mild left sided droop)   Left Side Facial Asymmetry minimal impairment   Lip Function (Oral Motor)   Lip Range of Motion (Oral Motor) retraction impairment  (Mild-moderate left side)   Tongue Function (Oral Motor)   Tongue ROM (Oral Motor) lateralization is impaired  (Mildly reduced agility & ROM)   Jaw Function (Oral Motor)   Jaw Function (Oral Motor) WNL   Cough/Swallow/Gag Reflex (Oral Motor)   Volitional Throat Clear/Cough (Oral Motor) WNL   Volitional Swallow (Oral Motor) WNL   Vocal Quality/Secretion Management (Oral Motor)   Vocal Quality (Oral Motor) WNL   General Swallowing Observations   Current Diet/Method of Nutritional Intake (General Swallowing Observations, NIS) thin liquids;regular diet   Swallowing Evaluation Clinical swallow evaluation   Clinical Swallow Evaluation   Feeding Assistance set up only required   Additional evaluation(s) completed today No   Clinical Swallow Evaluation Textures Trialed Thin Liquids;Puree Textures   Clinical Swallow Eval: Thin Liquid Texture Trial   Mode of Presentation, Thin Liquids straw;self-fed   Volume of Liquid or Food Presented ~3 ounces   Oral Phase of Swallow WFL   Pharyngeal Phase of Swallow intact   Diagnostic Statement No overt clinical s/s of aspiration with thin liquids by straw.   Clinical Swallow Evaluation: Puree Solid Texture Trial   Mode of Presentation, Puree spoon;self-fed   Volume of Puree Presented Small bites of puree   Oral Phase, Puree WFL   Pharyngeal Phase, Puree intact   Diagnostic Statement No overt clinical  s/s of aspiration with puree. Pt denied sensation of pharyngeal retention.   Esophageal Phase of Swallow   Patient reports or presents with symptoms of esophageal dysphagia Other (Comments)  (Not during this meal, though intake was limited.)   Esophageal comments Patient reports that foods sometimes feel as though they stick in her throat or chest. Symptom seems more consistent with esophageal dysmotility issue(s). Patient had a normal EGD in February 2021.   Swallowing Recommendations   Diet Consistency Recommendations thin liquids;regular diet   Supervision Level for Intake patient independent   Mode of Delivery Recommendations bolus size, small   Swallowing Maneuver Recommendations alternate food and liquid intake   Recommended Feeding/Eating Techniques (Swallow Eval) maintain upright sitting position for eating;maintain upright posture during/after eating for 30 minutes   Medication Administration Recommendations, Swallowing (SLP) Take whole with water as tolerated   Comment, Swallowing Recommendations No evidence of oropharyngeal dysphagia noted during this evaluation, though patient's intake was limited d/t nausea. Question possible esophageal dysmotility issue(s).   General Therapy Interventions   Planned Therapy Interventions Dysphagia Treatment   Dysphagia treatment Instruction of safe swallow strategies   SLP Therapy Assessment/Plan   Criteria for Skilled Therapeutic Interventions Met (SLP Eval) yes;treatment indicated   SLP Diagnosis No overt evidence of oropharyngeal dysphagia, but question possible esophageal dysphagia.   Rehab Potential (SLP Eval) good, to achieve stated therapy goals   Therapy Frequency (SLP Eval) daily   Predicted Duration of Therapy Intervention (SLP Eval) 5 days or less   Comment, Therapy Assessment/Plan (SLP) Clinical Bedside Swallow evaluation completed per MD orders. Evaluation was rather limited due to patient's persistent nausea. She reports that the medication she is  "presently receiving for nausea is not effective. No oral or pharyngeal dysphagia observed with thin liquid or pureed solids. Patient declined to trial a soft solid or regular solid due to nausea. No overt clinical s/s of aspiration observed with thin liquid or puree. Patient's report of food/liquid \"sticking\" in her throat/chest is suggestive of possible esophageal dysmotility issue(s), though her EGD in February was WNL. Recommend VFSS to rule out silent aspiration, as well as an esophagram to further assess for esophageal component to patient's dysphagia. These evaluations were originally planned for 3/29/21, but patient discharged acutely before these could be completed.   Therapy Plan Review/Discharge Plan (SLP)   Therapy Plan Review (SLP) evaluation/treatment results reviewed;care plan/treatment goals reviewed;risks/benefits reviewed;participants voiced agreement with care plan;participants included;patient   Demonstrates Need for Referral to Another Service (SLP) occupational therapist;physical therapist    Total Evaluation Time   Total Evaluation Time (Minutes) 30     "

## 2021-03-31 NOTE — PROGRESS NOTES
Discharge Planner Post-Acute Rehab OT:     Discharge Plan: Home with HC vs OP OT services. Est. LOS: 10 days    Precautions: falls     Current Status:  ADLs: Pt requires SBA with donning shirt seated and min A with LBD tasks with FWW. Pt requires CGA with STS with FWW and CGA with functional mobility with FWW in BR. Pt requires CGA with toilet transfer with FWW. Pt requires CGA with G/H tasks standing at EOS with FWW.   IADLs: Will further assess. Pt has supportive family to assist upon discharge.   Vision/Cognition: Pt wears reading glasses. Will further assess cognition.     Assessment: Pt is presenting below baseline with ADLs and functional mobility s/p prolonged hospitalization. Pt presents with generalized weakness and deconditioning limited IND with ADLs/IADLs. Pt would benefit from skilled OT services to maximize IND and safety with ADLs/IADLs and functional mobility.     Other Barriers to Discharge (DME, Family Training, etc): Family training prior to discharge. Pt has grab bars in bathroom and a shower chair.     -10 d/t low back pain. Notified Resident regarding pt's back pain. Provided pt with hot pack

## 2021-03-31 NOTE — PLAN OF CARE
Discharge Planner Post-Acute Rehab PT:     Discharge Plan: Home with family assist prn, HCPT    Precautions: Falls    Current Status:  Bed Mobility: MOD A at BLE in/out of bed  Transfer: stand pivot with WW, CGA  Gait: 20 ft with WW, CGA  Stairs: Not yet tested  Balance: Needs WW support during ambulation    Assessment: Pt is below baseline for functional mobility s/p prolonged hsopitalization. She presents with generalized weakness in hips and decreased endurance. Will benefit from ongoing IP PT on ARU x 10 days to achieve MOD I level of mobility in order to return to home.     Other Barriers to Discharge (DME, Family Training, etc): None anticipated, WW for home

## 2021-04-01 ENCOUNTER — APPOINTMENT (OUTPATIENT)
Dept: OCCUPATIONAL THERAPY | Facility: CLINIC | Age: 61
End: 2021-04-01
Payer: COMMERCIAL

## 2021-04-01 LAB
ANION GAP SERPL CALCULATED.3IONS-SCNC: 11 MMOL/L (ref 3–14)
BACTERIA SPEC CULT: ABNORMAL
BACTERIA SPEC CULT: ABNORMAL
BUN SERPL-MCNC: 16 MG/DL (ref 7–30)
CALCIUM SERPL-MCNC: 8.1 MG/DL (ref 8.5–10.1)
CHLORIDE SERPL-SCNC: 110 MMOL/L (ref 94–109)
CO2 SERPL-SCNC: 21 MMOL/L (ref 20–32)
CREAT SERPL-MCNC: 2.74 MG/DL (ref 0.52–1.04)
ERYTHROCYTE [DISTWIDTH] IN BLOOD BY AUTOMATED COUNT: 18.6 % (ref 10–15)
GFR SERPL CREATININE-BSD FRML MDRD: 18 ML/MIN/{1.73_M2}
GLUCOSE BLDC GLUCOMTR-MCNC: 117 MG/DL (ref 70–99)
GLUCOSE BLDC GLUCOMTR-MCNC: 140 MG/DL (ref 70–99)
GLUCOSE BLDC GLUCOMTR-MCNC: 164 MG/DL (ref 70–99)
GLUCOSE BLDC GLUCOMTR-MCNC: 99 MG/DL (ref 70–99)
GLUCOSE SERPL-MCNC: 102 MG/DL (ref 70–99)
HCT VFR BLD AUTO: 28.9 % (ref 35–47)
HGB BLD-MCNC: 9.2 G/DL (ref 11.7–15.7)
Lab: ABNORMAL
MAGNESIUM SERPL-MCNC: 1.9 MG/DL (ref 1.6–2.3)
MCH RBC QN AUTO: 31.2 PG (ref 26.5–33)
MCHC RBC AUTO-ENTMCNC: 31.8 G/DL (ref 31.5–36.5)
MCV RBC AUTO: 98 FL (ref 78–100)
PLATELET # BLD AUTO: 381 10E9/L (ref 150–450)
POTASSIUM SERPL-SCNC: 3.1 MMOL/L (ref 3.4–5.3)
RBC # BLD AUTO: 2.95 10E12/L (ref 3.8–5.2)
SODIUM SERPL-SCNC: 142 MMOL/L (ref 133–144)
SPECIMEN SOURCE: ABNORMAL
WBC # BLD AUTO: 8.7 10E9/L (ref 4–11)

## 2021-04-01 PROCEDURE — 85027 COMPLETE CBC AUTOMATED: CPT | Performed by: STUDENT IN AN ORGANIZED HEALTH CARE EDUCATION/TRAINING PROGRAM

## 2021-04-01 PROCEDURE — 92526 ORAL FUNCTION THERAPY: CPT | Mod: GN

## 2021-04-01 PROCEDURE — 83735 ASSAY OF MAGNESIUM: CPT | Performed by: STUDENT IN AN ORGANIZED HEALTH CARE EDUCATION/TRAINING PROGRAM

## 2021-04-01 PROCEDURE — 97110 THERAPEUTIC EXERCISES: CPT | Mod: GO | Performed by: OCCUPATIONAL THERAPIST

## 2021-04-01 PROCEDURE — 36415 COLL VENOUS BLD VENIPUNCTURE: CPT | Performed by: STUDENT IN AN ORGANIZED HEALTH CARE EDUCATION/TRAINING PROGRAM

## 2021-04-01 PROCEDURE — 97110 THERAPEUTIC EXERCISES: CPT | Mod: GP

## 2021-04-01 PROCEDURE — 80048 BASIC METABOLIC PNL TOTAL CA: CPT | Performed by: STUDENT IN AN ORGANIZED HEALTH CARE EDUCATION/TRAINING PROGRAM

## 2021-04-01 PROCEDURE — 128N000003 HC R&B REHAB

## 2021-04-01 PROCEDURE — 97535 SELF CARE MNGMENT TRAINING: CPT | Mod: GO | Performed by: OCCUPATIONAL THERAPIST

## 2021-04-01 PROCEDURE — 250N000013 HC RX MED GY IP 250 OP 250 PS 637: Performed by: STUDENT IN AN ORGANIZED HEALTH CARE EDUCATION/TRAINING PROGRAM

## 2021-04-01 PROCEDURE — 250N000009 HC RX 250: Performed by: STUDENT IN AN ORGANIZED HEALTH CARE EDUCATION/TRAINING PROGRAM

## 2021-04-01 PROCEDURE — 999N001017 HC STATISTIC GLUCOSE BY METER IP

## 2021-04-01 PROCEDURE — 99233 SBSQ HOSP IP/OBS HIGH 50: CPT | Mod: GC | Performed by: PHYSICAL MEDICINE & REHABILITATION

## 2021-04-01 PROCEDURE — 250N000012 HC RX MED GY IP 250 OP 636 PS 637: Performed by: STUDENT IN AN ORGANIZED HEALTH CARE EDUCATION/TRAINING PROGRAM

## 2021-04-01 PROCEDURE — 97530 THERAPEUTIC ACTIVITIES: CPT | Mod: GP

## 2021-04-01 RX ORDER — POTASSIUM CHLORIDE 750 MG/1
40 TABLET, EXTENDED RELEASE ORAL ONCE
Status: COMPLETED | OUTPATIENT
Start: 2021-04-01 | End: 2021-04-01

## 2021-04-01 RX ORDER — POTASSIUM CHLORIDE 1.5 G/1.58G
40 POWDER, FOR SOLUTION ORAL ONCE
Status: DISCONTINUED | OUTPATIENT
Start: 2021-04-01 | End: 2021-04-03

## 2021-04-01 RX ORDER — SCOLOPAMINE TRANSDERMAL SYSTEM 1 MG/1
1 PATCH, EXTENDED RELEASE TRANSDERMAL
Status: DISCONTINUED | OUTPATIENT
Start: 2021-04-01 | End: 2021-04-07

## 2021-04-01 RX ORDER — LACTOBACILLUS RHAMNOSUS GG 10B CELL
1 CAPSULE ORAL 2 TIMES DAILY
Status: DISCONTINUED | OUTPATIENT
Start: 2021-04-01 | End: 2021-04-07

## 2021-04-01 RX ADMIN — Medication 12.5 MG: at 20:51

## 2021-04-01 RX ADMIN — MAGNESIUM OXIDE TAB 400 MG (241.3 MG ELEMENTAL MG) 400 MG: 400 (241.3 MG) TAB at 17:04

## 2021-04-01 RX ADMIN — Medication 12.5 MG: at 08:15

## 2021-04-01 RX ADMIN — INSULIN ASPART 1 UNITS: 100 INJECTION, SOLUTION INTRAVENOUS; SUBCUTANEOUS at 11:32

## 2021-04-01 RX ADMIN — MAGNESIUM OXIDE TAB 400 MG (241.3 MG ELEMENTAL MG) 400 MG: 400 (241.3 MG) TAB at 08:15

## 2021-04-01 RX ADMIN — Medication 1 CAPSULE: at 11:33

## 2021-04-01 RX ADMIN — Medication 1 TABLET: at 08:16

## 2021-04-01 RX ADMIN — POTASSIUM CHLORIDE 40 MEQ: 750 TABLET, EXTENDED RELEASE ORAL at 10:28

## 2021-04-01 RX ADMIN — TICAGRELOR 90 MG: 90 TABLET ORAL at 20:51

## 2021-04-01 RX ADMIN — SCOPALAMINE 1 PATCH: 1 PATCH, EXTENDED RELEASE TRANSDERMAL at 17:02

## 2021-04-01 RX ADMIN — OMEPRAZOLE 20 MG: 20 CAPSULE, DELAYED RELEASE ORAL at 06:53

## 2021-04-01 RX ADMIN — FERROUS SULFATE TAB 325 MG (65 MG ELEMENTAL FE) 325 MG: 325 (65 FE) TAB at 08:15

## 2021-04-01 RX ADMIN — CEFPODOXIME PROXETIL 200 MG: 200 TABLET, FILM COATED ORAL at 08:15

## 2021-04-01 RX ADMIN — ASPIRIN 81 MG CHEWABLE TABLET 81 MG: 81 TABLET CHEWABLE at 08:16

## 2021-04-01 RX ADMIN — TICAGRELOR 90 MG: 90 TABLET ORAL at 08:16

## 2021-04-01 RX ADMIN — Medication 1 CAPSULE: at 20:51

## 2021-04-01 RX ADMIN — SODIUM BICARBONATE 650 MG TABLET 650 MG: at 08:15

## 2021-04-01 RX ADMIN — OMEPRAZOLE 20 MG: 20 CAPSULE, DELAYED RELEASE ORAL at 17:04

## 2021-04-01 RX ADMIN — SODIUM BICARBONATE 650 MG TABLET 650 MG: at 17:04

## 2021-04-01 NOTE — PROGRESS NOTES
"  [facility]   Acute Rehabilitation Unit  Daily progress note    INTERVAL HISTORY  No acute overnight events. Nursing notes she is continent of bladder but has incontinence of bowel. She also had back pain but refused the pain medications, lidocaine patch, and heat pad; she has since tried these. She continues to report nausea leading to a secondarily poor appetite. When seen today denies any chest pain, SOB.    Function:  Bed Mobility: MOD A at BLE in/out of bed  Transfer: stand pivot with WW, CGA  Gait: 20 ft with WW, CGA  Stairs: Not yet tested  Balance: Needs WW support during ambulation    MEDICATIONS  Scheduled meds    aspirin  81 mg Oral Daily     calcium polycarbophil  1,250 mg Oral Daily     cefpodoxime  200 mg Oral Daily     ferrous sulfate  325 mg Oral Daily with breakfast     insulin aspart  1-3 Units Subcutaneous TID AC     insulin aspart  1-3 Units Subcutaneous At Bedtime     lactobacillus rhamnosus (GG)  1 capsule Oral BID     lidocaine  1 patch Transdermal Q24h    And     lidocaine   Transdermal Q8H     magnesium oxide  400 mg Oral BID     metoprolol tartrate  12.5 mg Oral BID     multivitamin RENAL  1 tablet Oral Daily     omeprazole  20 mg Oral BID AC     potassium chloride  40 mEq Oral Once     psyllium  1 capsule Oral Daily     sodium bicarbonate  650 mg Oral BID     ticagrelor  90 mg Oral BID     PRN meds:  acetaminophen, glucose **OR** dextrose **OR** glucagon, guaiFENesin-dextromethorphan, ipratropium - albuterol 0.5 mg/2.5 mg/3 mL, melatonin, polyethylene glycol, prochlorperazine, senna-docusate    PHYSICAL EXAM  BP (!) 152/94   Pulse 84   Temp 97.7  F (36.5  C) (Oral)   Resp 20   Ht 1.499 m (4' 11\")   Wt 56 kg (123 lb 6.4 oz)   SpO2 99%   BMI 24.92 kg/m    Gen: Pleasant female, laying in bed, appears uncomfortable, nauseated  HEENT: Atraumatic  Cardio: Limbs appear well perfused  Pulm: Nonlabored breathing on room air  Abd: Not distended  Ext: No " LE tenderness  Neuro/MSK: Speech clear and fluent per , moving limbs antigravity. Walks through room with walker    LABS  No CBC/BMP in last 24 hours.    ASSESSMENT AND PLAN  Breana Boudreaux is a 61 year old woman with CAD s/p recent stenting 2/3/21, DM type 2, HTN, RLS, who presented 2/21/21 with shortness of breath found to be in SHYANNE with lactic acidosis, rhabdomyolysis, with other electrolyte abnormalities was started on dialysis, hospital stay complicated by GI bleed, UTI, thrombocytopenia,  discharged to LTAC 3/12 last dialysis 3/13. Admitted to acute rehab 3/26/21. Briefly discharged back to acute hospital 3/28-3/30, then readmitted to ARU on 3/30/21.     Admission to acute inpatient rehab rhabdomyolysis  Impairment group code: 03.8     1. PT, OT 90 minutes of each on a daily basis, in addition to rehab nursing and close management of physiatrist.       2. Impairment of ADL's: Noted to have impaired strength, impaired activity tolerance, leading to decreased ability to independently complete ADL's.  Will benefit from ongoing OT with goal for MOD I with basic ADLs.      3. Impairment of mobility:  Noted to have impaired strength, impaired activity tolerance, and impaired balance leading to decreased mobility.  Will benefit from ongoing PT with goal for CHERYL with basic mobility.      4. Medical Conditions  Acute Kidney Injury  Lactic acidosis-   Rhabdomyolysis  Presented 2/23/21 with SHYANNE, lactic acidosis, rhabdo CK 80,000 requiring HD felt to be secondary to statin. Last HD 3/13  -trend weights, intake/output  -trend BMP  -continue to hold statin  -continue to hold metformin  -started on sodium bicarb 650 bid 3/26 per discharging facility     Urosepsis  Possible Healthcare Acquired Pneumonia  - Cefpodoxime 200 mg qDay x4 days     CAD- s/p PCI 2/3/21 on brilinta and asa.  -continue dual antiplatelet therapy  -metoprolol 12.5 mg bid dose reduced 3/26     HTN- with hypotension during LTAC stay with  metoprolol dose reduced 3/26.   -monitor BP  -continue metoprolol 12.5 mg bid     Nausea/vomiting- reports ongoing since hospitalization 2/21 told was in setting of renal failure though has not improved per her report. Last QTc 3/16 486.    -ppi as above  -encourage intake  -prn compazine     QTc- mildly prolonged 486 3/16  -routine EKG eval Qtc-      Hypomagnesemia, Hypokalemia  - continue mag oxide  - 4/1: Replete 40 mEq K  -Monitor     Recent GI bleed- with edg and flex sig 2/22/21 with resolution.   -monitor  -continue ppi bid     Anemia- in setting of critical illness, renal failure.  Hgb 7.3 3/30  -continue iron supplement daily  -trend hgb     Leucocytosis-  WBC 8.5 3/30  -trend  -monitor for signs/symptoms of infection     DM type 2- Hgb A1c 2/21/21 9.2% previously on metformin, lantus, metformin held in setting of recent lactic acidosis. bg overall stable on ssi, though intake reported as poor due to nausea/ vomiting  -check bg qid  -ssi- will taper off or add long acting pending bg levels during rehab stay.       5. Adjustment to disability:  Clinical psychology to eval and treat as indicated  6. FEN: regular- cardiac- though appetite poor and n/v  7. Bowel: Schedule pysllium in attempt to firm up stools. If patient remains incontinent, will continue starting bowel program. Will also order probiotic as patient has been on antibiotics  8. Bladder: monitor as recovering renal failure  9. DVT Prophylaxis: mechanical  10. GI Prophylaxis: ppi bid  11. Code: full  12. Disposition: home  13. ELOS: 4/9/21  14. Rehab prognosis:  good  15. Follow up Appointments on Discharge: pcp, cardiology    Darian Bella MD  PGY3 Physical Medicine & Rehabilitation

## 2021-04-01 NOTE — PLAN OF CARE
Discharge Planner Post-Acute Rehab PT:     Discharge Plan: Home with family assist prn, HCPT    Precautions: Falls, pt decline need for interpretor (will need for more in depth education)     Current Status:  Bed Mobility: MOD A at BLE in/out of bed  Transfer: stand pivot with WW, CGA  Gait: 50-60 ft with WW, CGA  Stairs: Not yet tested  Balance: Needs WW support during ambulation    Assessment: Pt limited by fatigue and continued nausea; BP high at start of session, decreased with activity. Started session with flexion based strengthening to reduce back pain prior to pt being willing to mobilize OOB. Pt nauseous at end of session.     Other Barriers to Discharge (DME, Family Training, etc): None anticipated, WW for home

## 2021-04-01 NOTE — PROGRESS NOTES
Received call from xray that patient will be having esophogram on 4/2 at 1pm.  Patient is to be NPO after breakfast by 10 am on 4/2. Patient is able to have meds with sips of water if needed after this time  Order placed.

## 2021-04-01 NOTE — PLAN OF CARE
FOCUS/GOAL  Bowel management and Pain management    ASSESSMENT, INTERVENTIONS AND CONTINUING PLAN FOR GOAL:  Patient is alert/oriented, has been using call light appropriately.  Patient has been having still frequent stool during the night, calls to use the bathroom and is sometimes incontinent of stool and sometimes makes it. Stool is soft-loose brown. Patient denies pain, is able to ambulate with walker and Ao1. Requested for water and is able to sit up EOB and take sips, seems to sleep in between calls.

## 2021-04-01 NOTE — PROGRESS NOTES
Discharge Planner Post-Acute Rehab OT:      Discharge Plan: Home with HC vs OP OT services. Est. LOS: 10 days     Precautions: falls, hx of chronic low back pain     Current Status:  ADLs: Pt requires SBA with donning shirt seated and min A with LBD tasks with FWW. Pt requires CGA with STS with FWW and CGA with functional mobility with FWW in BR. Pt requires CGA with toilet transfer with FWW. Pt requires CGA with G/H tasks standing at EOS with FWW. Pt requires CGA with W/C<>extended tub bench SPT and assistance to wash/rinse/dry 2/10 body parts seated.   IADLs: Will further assess. Pt has supportive family to assist upon discharge.   Vision/Cognition: Pt wears reading glasses. Will further assess cognition.      Assessment: Pt is limited by fatigue and low back pain throughout session with ADLs with FWW. Educated pt on modalities for pain mgmt for low back-pt is resistant to medications, but is willing to try hot pack and is thinking about trying TENS unit with therapy. Completed full shower and ADLs during session. Pt would benefit from skilled OT services to maximize IND and safety with ADLs/IADLs and functional mobility.      Other Barriers to Discharge (DME, Family Training, etc): Family training prior to discharge. Pt has grab bars in bathroom and a shower chair.      -15 d/t low back pain and fatigue. Provided pt with hot pack during session.

## 2021-04-01 NOTE — PROGRESS NOTES
Pt alert and uses call light to make her needs known. Patient ate a late lunch then refused dinner and/or supplements. Blood sugar at . Continent of bladder this shift incont of BM on day shift. Transfers/ambulates with assist of one, gait belt and walker. Complained of low back pain; ice pack applied and effective. Refused use of prn tylenol stating that she does not want to take more meds. Pt refused lidocaine patch stating its too cold. Writer educated on use of pain patch and benefits of controlling pain throughout the night. Pt continues to refuse. Heat pack offered and declined. Offered to start the patch in the AM. Pt refused; will pass on to offer in AM. Encouraged to update nursing. Call light within reach. Continue to monitor pain.

## 2021-04-01 NOTE — PLAN OF CARE
Discharge Planner Post-Acute Rehab SLP:      Discharge Plan: Home w/ likely no ongoing Speech needs     Precautions: None from SLP perspective     Current Status:  Communication: WNL; able to make wants, needs, & thoughts known. Speaks some English, but benefits from Hmong .  Cognition: Appears intact. Pt A&Ox4.  Swallow: Regular textures, thin liquids. Nausea impacting oral intake. No overt s/sx of aspiration. Suspect some esophageal dysphagia.     Assessment: Patient denies any chewing/swallowing difficulties and just endorsing the nausea that she has been dealing with for some time now. pt tolerating specific food choices with no s/sx of nausea this afternoon. trialed rome, carrots, and cream of rice. pt occcasionaly making grimiced facial expression but denied pain or discomfort when swallowing. Discussed with MD regarding esophagram and this may be pursued by medical team.  No indication of pharyngeal dysphagia and cancelling VFSS. Decreasing intensity of treatment to 3x/week.     Other Barriers to Discharge (Family Training, etc): None from SLP perspective

## 2021-04-01 NOTE — PLAN OF CARE
"[id number]  VS: Alert and oriented. VSS. Denied chest pain. Denied SOB. Pt c/o lower back pain; refused lidocaine patch but applied warm packs.    Output: Up to bathroom voiding spontaneously. Few episodes of brief soiling d/t urgency. Having loose stools today. Provider aware.    Activity: Ax1 via walker and gb   Skin/Dressing: Intact except for blanchable redness noted to coccyx, barrier cream applied.   Diet: 3g Na diet, thin liquids.  Pt had one large bout of emesis today after giving liquid K supplement. States \"It was because of the taste.\" Provider notified. Given K tablets w/o difficulty. Pt still reporting nausea; seabands and scopolamine patch applied to L ear.   LDA: R PICC CDI   Additional Info/Plan: Pt able to make her needs known. Hmong speaking, iPad for  in room. Call light within reach, called appropriately this shift.   Pt has Esophogram scheduled for tomorrow. Breakfast order placed to arrive by 0900. NPO after 1000.     "

## 2021-04-02 ENCOUNTER — APPOINTMENT (OUTPATIENT)
Dept: GENERAL RADIOLOGY | Facility: CLINIC | Age: 61
DRG: 558 | End: 2021-04-02
Attending: PHYSICAL MEDICINE & REHABILITATION
Payer: COMMERCIAL

## 2021-04-02 LAB
ANION GAP SERPL CALCULATED.3IONS-SCNC: 9 MMOL/L (ref 3–14)
BUN SERPL-MCNC: 14 MG/DL (ref 7–30)
CALCIUM SERPL-MCNC: 7.9 MG/DL (ref 8.5–10.1)
CHLORIDE SERPL-SCNC: 110 MMOL/L (ref 94–109)
CO2 SERPL-SCNC: 23 MMOL/L (ref 20–32)
CREAT SERPL-MCNC: 2.7 MG/DL (ref 0.52–1.04)
GFR SERPL CREATININE-BSD FRML MDRD: 18 ML/MIN/{1.73_M2}
GLUCOSE BLDC GLUCOMTR-MCNC: 116 MG/DL (ref 70–99)
GLUCOSE BLDC GLUCOMTR-MCNC: 140 MG/DL (ref 70–99)
GLUCOSE BLDC GLUCOMTR-MCNC: 171 MG/DL (ref 70–99)
GLUCOSE SERPL-MCNC: 89 MG/DL (ref 70–99)
MAGNESIUM SERPL-MCNC: 1.9 MG/DL (ref 1.6–2.3)
POTASSIUM SERPL-SCNC: 3.1 MMOL/L (ref 3.4–5.3)
POTASSIUM SERPL-SCNC: 3.2 MMOL/L (ref 3.4–5.3)
POTASSIUM SERPL-SCNC: 3.5 MMOL/L (ref 3.4–5.3)
SODIUM SERPL-SCNC: 142 MMOL/L (ref 133–144)

## 2021-04-02 PROCEDURE — 83735 ASSAY OF MAGNESIUM: CPT | Performed by: STUDENT IN AN ORGANIZED HEALTH CARE EDUCATION/TRAINING PROGRAM

## 2021-04-02 PROCEDURE — 84132 ASSAY OF SERUM POTASSIUM: CPT | Performed by: PHYSICAL MEDICINE & REHABILITATION

## 2021-04-02 PROCEDURE — 999N001017 HC STATISTIC GLUCOSE BY METER IP

## 2021-04-02 PROCEDURE — 250N000013 HC RX MED GY IP 250 OP 250 PS 637: Performed by: STUDENT IN AN ORGANIZED HEALTH CARE EDUCATION/TRAINING PROGRAM

## 2021-04-02 PROCEDURE — 36415 COLL VENOUS BLD VENIPUNCTURE: CPT | Performed by: STUDENT IN AN ORGANIZED HEALTH CARE EDUCATION/TRAINING PROGRAM

## 2021-04-02 PROCEDURE — 128N000003 HC R&B REHAB

## 2021-04-02 PROCEDURE — 97535 SELF CARE MNGMENT TRAINING: CPT | Mod: GO

## 2021-04-02 PROCEDURE — 97110 THERAPEUTIC EXERCISES: CPT | Mod: GO

## 2021-04-02 PROCEDURE — 80048 BASIC METABOLIC PNL TOTAL CA: CPT | Performed by: STUDENT IN AN ORGANIZED HEALTH CARE EDUCATION/TRAINING PROGRAM

## 2021-04-02 PROCEDURE — 74220 X-RAY XM ESOPHAGUS 1CNTRST: CPT | Mod: 26 | Performed by: RADIOLOGY

## 2021-04-02 PROCEDURE — 250N000011 HC RX IP 250 OP 636: Performed by: STUDENT IN AN ORGANIZED HEALTH CARE EDUCATION/TRAINING PROGRAM

## 2021-04-02 PROCEDURE — 99232 SBSQ HOSP IP/OBS MODERATE 35: CPT | Mod: GC | Performed by: PHYSICAL MEDICINE & REHABILITATION

## 2021-04-02 PROCEDURE — 84132 ASSAY OF SERUM POTASSIUM: CPT | Performed by: STUDENT IN AN ORGANIZED HEALTH CARE EDUCATION/TRAINING PROGRAM

## 2021-04-02 PROCEDURE — 74220 X-RAY XM ESOPHAGUS 1CNTRST: CPT

## 2021-04-02 PROCEDURE — 97110 THERAPEUTIC EXERCISES: CPT | Mod: GP

## 2021-04-02 PROCEDURE — 36415 COLL VENOUS BLD VENIPUNCTURE: CPT | Performed by: PHYSICAL MEDICINE & REHABILITATION

## 2021-04-02 RX ORDER — POTASSIUM CHLORIDE 750 MG/1
20 TABLET, EXTENDED RELEASE ORAL ONCE
Status: COMPLETED | OUTPATIENT
Start: 2021-04-02 | End: 2021-04-02

## 2021-04-02 RX ORDER — HEPARIN SODIUM,PORCINE 10 UNIT/ML
5-10 VIAL (ML) INTRAVENOUS EVERY 24 HOURS
Status: DISCONTINUED | OUTPATIENT
Start: 2021-04-02 | End: 2021-04-07

## 2021-04-02 RX ORDER — HEPARIN SODIUM,PORCINE 10 UNIT/ML
5-10 VIAL (ML) INTRAVENOUS
Status: DISCONTINUED | OUTPATIENT
Start: 2021-04-02 | End: 2021-04-07

## 2021-04-02 RX ADMIN — FERROUS SULFATE TAB 325 MG (65 MG ELEMENTAL FE) 325 MG: 325 (65 FE) TAB at 09:52

## 2021-04-02 RX ADMIN — SODIUM BICARBONATE 650 MG TABLET 650 MG: at 17:46

## 2021-04-02 RX ADMIN — TICAGRELOR 90 MG: 90 TABLET ORAL at 09:52

## 2021-04-02 RX ADMIN — OMEPRAZOLE 20 MG: 20 CAPSULE, DELAYED RELEASE ORAL at 06:09

## 2021-04-02 RX ADMIN — ASPIRIN 81 MG CHEWABLE TABLET 81 MG: 81 TABLET CHEWABLE at 09:52

## 2021-04-02 RX ADMIN — OMEPRAZOLE 20 MG: 20 CAPSULE, DELAYED RELEASE ORAL at 17:47

## 2021-04-02 RX ADMIN — Medication 12.5 MG: at 09:52

## 2021-04-02 RX ADMIN — HEPARIN, PORCINE (PF) 10 UNIT/ML INTRAVENOUS SYRINGE 5 ML: at 14:51

## 2021-04-02 RX ADMIN — Medication 1 TABLET: at 09:52

## 2021-04-02 RX ADMIN — INSULIN ASPART 1 UNITS: 100 INJECTION, SOLUTION INTRAVENOUS; SUBCUTANEOUS at 17:47

## 2021-04-02 RX ADMIN — MAGNESIUM OXIDE TAB 400 MG (241.3 MG ELEMENTAL MG) 400 MG: 400 (241.3 MG) TAB at 17:47

## 2021-04-02 RX ADMIN — CALCIUM POLYCARBOPHIL 1250 MG: 625 TABLET, FILM COATED ORAL at 09:51

## 2021-04-02 RX ADMIN — POTASSIUM CHLORIDE 20 MEQ: 750 TABLET, EXTENDED RELEASE ORAL at 09:53

## 2021-04-02 RX ADMIN — Medication 1 CAPSULE: at 09:53

## 2021-04-02 RX ADMIN — Medication 1 CAPSULE: at 20:44

## 2021-04-02 RX ADMIN — CEFPODOXIME PROXETIL 200 MG: 200 TABLET, FILM COATED ORAL at 09:51

## 2021-04-02 RX ADMIN — MAGNESIUM OXIDE TAB 400 MG (241.3 MG ELEMENTAL MG) 400 MG: 400 (241.3 MG) TAB at 09:53

## 2021-04-02 RX ADMIN — Medication 12.5 MG: at 20:44

## 2021-04-02 RX ADMIN — SODIUM BICARBONATE 650 MG TABLET 650 MG: at 09:52

## 2021-04-02 RX ADMIN — TICAGRELOR 90 MG: 90 TABLET ORAL at 20:44

## 2021-04-02 NOTE — PLAN OF CARE
"[id number]:    Vitals: /85 (BP Location: Left arm)   Pulse 93   Temp 98  F (36.7  C) (Oral)   Resp 18   Ht 1.499 m (4' 11\")   Wt 56 kg (123 lb 6.4 oz)   SpO2 98%   BMI 24.92 kg/m       Denies CP, SOB.   Output: Voiding spontaneously w/out difficulty. Can be incontinent, d/t urgency, up to toilet, wearing a brief.   Last BM: 4/1, loose stools   Activity: CGA w/ walker   Skin: Intact. Ex blanchable redness on coccyx, barrier cream applied   Pain: Occasional lower back pain, managed w/ heat packs   CMS/Neuro: Intact. A&O x 4   Dressing: PICC-dried drainage   Diet: Regular/thin, Meds: whole  BG checks    LDA: R-PICC   Equipment: Walker, gait belt   Plan/Add'l info:   Able to make needs known. Call light within reach. Continue with POC.         "

## 2021-04-02 NOTE — PROGRESS NOTES
"Individualized Overall Plan Of Care (IOPOC)      Rehab diagnosis/Impairment Group Code: Neurologic conditions 03.9 other neurologic: severe rhabdomyolysis and prolonged hospitalization  Debility       Expected functional outcome: Anticipate that she will progress to modified independent with mobility and ADLs with an assistive device.     Clinical Impression Comments: Breana Boudreaux is making progress in therapies.  She continues to have persistent nausea and low back pain, which is limiting her participation in therapies.  Despite this challenges, she continues to be motivated and is making steady gains in therapies.    Mobility: Pt is below baseline for functional mobility s/p prolonged hsopitalization. She presents with generalized weakness in hips and decreased endurance. Will benefit from ongoing IP PT on ARU x 10 days to achieve MOD I level of mobility in order to return to home.     ADL: OT: Pt would benefit from skilled OT services to increase IND and safety with ADLs/IADLs and functional mobility.     Communication/Cognition/Swallow: Clinical Bedside Swallow evaluation completed per MD orders. Evaluation was rather limited due to patient's persistent nausea. She reports that the medication she is presently receiving for nausea is not effective. No oral or pharyngeal dysphagia observed with thin liquid or pureed solids. Patient declined to trial a soft solid or regular solid due to nausea. No overt clinical s/s of aspiration observed with thin liquid or puree. Patient's report of food/liquid \"sticking\" in her throat/chest is suggestive of possible esophageal dysmotility issue(s), though her EGD in February was WNL. Recommend VFSS to rule out silent aspiration, as well as an esophagram to further assess for esophageal component to patient's dysphagia. These evaluations were originally planned for 3/29/21, but patient discharged acutely before these could be completed.     Intensity of therapy:   PT 60 minutes, " Daily, for 10 days  OT 60 minutes, Daily, for 10 days  SLP 60 minutes, daily, for 5 days or less    Orthotics None anticipated  Education diabetes  Neuropsychology Testing: No      Medical Prognosis: Anticipate that she will continue to make incremental improvements with regards to her independence with ADLs and mobility.  Per swallow function, she reports food/liquids sticking in her throat, suggestive of esophageal dysmotility issues, and therefore an esophagram will be obtained today.  Persistent nausea and low back pain are limiting her participation in therapies.       Physician summary statement: Anticipate that she will progress to modified independent with all mobility and ADLs, with ongoing improvement of her functional mobility and independence with ADLs.  She is cognitively intact, and therefore will not require any oversight for medication management.  May require some assistance mobility and ADLs upon discharge.      Discharge destination: prior home and family  Discharge rehabilitation needs: home care, RN, PT and OT      Estimated length of stay: 1 week      Rehabilitation Physician Missy Read MD

## 2021-04-02 NOTE — PLAN OF CARE
Discharge Planner Post-Acute Rehab OT:      Discharge Plan: Home with HC vs OP OT services. Est. LOS: 10 days     Precautions: falls, hx of chronic low back pain     Current Status:  ADLs: Pt requires SBA with donning shirt seated and min A with LBD tasks with FWW. Pt requires CGA with STS with FWW and CGA with functional mobility with FWW in BR. Pt requires CGA with toilet transfer with FWW. Pt requires CGA with G/H tasks standing at EOS with FWW. Pt requires CGA with W/C<>extended tub bench SPT and assistance to wash/rinse/dry 2/10 body parts seated.   IADLs: Will further assess. Pt has supportive family to assist upon discharge.   Vision/Cognition: Pt wears reading glasses. Will further assess cognition.      Assessment: Pt is limited by fatigue and nausea throughout session. Completed bed mobility and toileting task w/CGA. Pt tolerated UE exercises sitting supported seating and limited tolerance for unsupported sitting. Pt would benefit from skilled OT services to maximize IND and safety with ADLs/IADLs and functional mobility.      Other Barriers to Discharge (DME, Family Training, etc): Family training prior to discharge. Pt has grab bars in bathroom and a shower chair.      -15 d/t fatigue and nausea.

## 2021-04-02 NOTE — PLAN OF CARE
Discharge Planner Post-Acute Rehab PT:      Discharge Plan: Home with family assist prn, HCPT, ~4/9/21     Precautions: Falls, lower back pain, nausea  Needs Hmong  (iPad in room)     Current Status:  Bed Mobility: MOD A at BLE in/out of bed  Transfer: stand pivot with WW, CGA  Gait: 50-60 ft with WW, CGA  Stairs: Not yet tested  Balance: Needs WW support during ambulation     Assessment: Nausea improved today. Pt amb up to 60 ft in single bout - denies back pain. Very deconditioned with HR up to 115bpm with ambulation. SOB and needing time to recover.      Other Barriers to Discharge (DME, Family Training, etc): None anticipated, WW for home

## 2021-04-02 NOTE — PROGRESS NOTES
"  [facility]   Acute Rehabilitation Unit  Daily progress note    INTERVAL HISTORY  No acute overnight events. Reports feeling unchanged from yesterday. Similar levels of pain and nausea; the nausea is most troubling to her. Notes an episode of bowel incontinence. Continues to have loose stools. Denies CP, SOB, nausea.    Function:  Bed Mobility: MOD A at BLE in/out of bed  Transfer: stand pivot with WW, CGA  Gait: 50-60 ft with WW, CGA  Stairs: Not yet tested  Balance: Needs WW support during ambulation    MEDICATIONS  Scheduled meds    aspirin  81 mg Oral Daily     calcium polycarbophil  1,250 mg Oral Daily     cefpodoxime  200 mg Oral Daily     ferrous sulfate  325 mg Oral Daily with breakfast     insulin aspart  1-3 Units Subcutaneous TID AC     insulin aspart  1-3 Units Subcutaneous At Bedtime     lactobacillus rhamnosus (GG)  1 capsule Oral BID     lidocaine  1 patch Transdermal Q24h    And     lidocaine   Transdermal Q8H     magnesium oxide  400 mg Oral BID     metoprolol tartrate  12.5 mg Oral BID     multivitamin RENAL  1 tablet Oral Daily     omeprazole  20 mg Oral BID AC     potassium chloride  40 mEq Oral Once     psyllium  1 capsule Oral Daily     scopolamine  1 patch Transdermal Q72H    And     scopolamine   Transdermal Q8H     sodium bicarbonate  650 mg Oral BID     sodium chloride (PF)  10 mL Intracatheter Q7 Days     ticagrelor  90 mg Oral BID     PRN meds:  acetaminophen, glucose **OR** dextrose **OR** glucagon, guaiFENesin-dextromethorphan, ipratropium - albuterol 0.5 mg/2.5 mg/3 mL, melatonin, polyethylene glycol, prochlorperazine, senna-docusate, sodium chloride (PF)    PHYSICAL EXAM  /88   Pulse 99   Temp 97  F (36.1  C) (Oral)   Resp 18   Ht 1.499 m (4' 11\")   Wt 56 kg (123 lb 6.4 oz)   SpO2 99%   BMI 24.92 kg/m    Gen: Pleasant female, laying in bed, appears tired/uncomfortable  HEENT: Atraumatic  Cardio: RRR, normal S1/S2  Pulm: CTAB  Abd: " Soft, ND, NT, BS+  Ext: No LE tenderness  Neuro/MSK: Speech clear and fluent per , moving limbs antigravity,  strength symmetric.    LABS  No CBC/BMP in last 24 hours.    ASSESSMENT AND PLAN  Breana Boudreaux is a 61 year old woman with CAD s/p recent stenting 2/3/21, DM type 2, HTN, RLS, who presented 2/21/21 with shortness of breath found to be in SHYANNE with lactic acidosis, rhabdomyolysis, with other electrolyte abnormalities was started on dialysis, hospital stay complicated by GI bleed, UTI, thrombocytopenia,  discharged to LTAC 3/12 last dialysis 3/13. Admitted to acute rehab 3/26/21. Briefly discharged back to acute hospital 3/28-3/30, then readmitted to ARU on 3/30/21.     Admission to acute inpatient rehab rhabdomyolysis  Impairment group code: 03.8     1. PT, OT 90 minutes of each on a daily basis, in addition to rehab nursing and close management of physiatrist.       2. Impairment of ADL's: Noted to have impaired strength, impaired activity tolerance, leading to decreased ability to independently complete ADL's.  Will benefit from ongoing OT with goal for MOD I with basic ADLs.      3. Impairment of mobility:  Noted to have impaired strength, impaired activity tolerance, and impaired balance leading to decreased mobility.  Will benefit from ongoing PT with goal for CHERYL with basic mobility.      4. Medical Conditions  Acute Kidney Injury  Lactic acidosis-   Rhabdomyolysis  Presented 2/23/21 with SHYANNE, lactic acidosis, rhabdo CK 80,000 requiring HD felt to be secondary to statin. Last HD 3/13  -trend weights, intake/output  -trend BMP  -continue to hold statin  -continue to hold metformin  -started on sodium bicarb 650 bid 3/26 per discharging facility     Urosepsis  Possible Healthcare Acquired Pneumonia  - Cefpodoxime 200 mg qDay x4 days     CAD- s/p PCI 2/3/21 on brilinta and asa.  -continue dual antiplatelet therapy  -metoprolol 12.5 mg bid dose reduced 3/26     HTN- with hypotension  during LTAC stay with metoprolol dose reduced 3/26.   -monitor BP  -continue metoprolol 12.5 mg bid     Nausea/vomiting- reports ongoing since hospitalization 2/21 told was in setting of renal failure though has not improved per her report. Last QTc 3/16 486.    -ppi as above  -4/1: Scopolamine patch ordered  -encourage intake  -prn compazine     QTc- mildly prolonged 486 3/16  -routine EKG eval Qtc-      Hypomagnesemia, Hypokalemia  - continue mag oxide  - 4/1: Replete 40 mEq K; 4/2 order replacement protocol  -Monitor     Recent GI bleed- with edg and flex sig 2/22/21 with resolution.   -monitor  -continue ppi bid     Anemia- in setting of critical illness, renal failure.  Hgb 7.3 3/30  -continue iron supplement daily  -trend hgb     Leucocytosis-  WBC 8.5 3/30  -trend  -monitor for signs/symptoms of infection     DM type 2- Hgb A1c 2/21/21 9.2% previously on metformin, lantus, metformin held in setting of recent lactic acidosis. bg overall stable on ssi, though intake reported as poor due to nausea/ vomiting  -check bg qid  -ssi- will taper off or add long acting pending bg levels during rehab stay.       5. Adjustment to disability:  Clinical psychology to eval and treat as indicated  6. FEN: regular- cardiac- though appetite poor and n/v  7. Bowel: Schedule pysllium in attempt to firm up stools. If patient remains incontinent, will continue starting bowel program. Will also order probiotic as patient has been on antibiotics  8. Bladder: monitor as recovering renal failure  9. DVT Prophylaxis: mechanical  10. GI Prophylaxis: ppi bid  11. Code: full  12. Disposition: home  13. ELOS: 4/9/21  14. Rehab prognosis:  good  15. Follow up Appointments on Discharge: pcp, cardiology    Darian Bella MD  PGY3 Physical Medicine & Rehabilitation

## 2021-04-02 NOTE — PLAN OF CARE
Alert and oriented x4, cont of B/B LBM 4/1. Pt ate 75% of breakfast and then was NPO from 1000 to time of esophogram. See results. Reports nausea, pt did not have emesis this shift. Scopolamine patch in place. K was low this AM 3.2, replaced orally and recheck was 3.5. pt now on RN-Managed K and Mg protocols. Lab draws for AM ordered. CVC hep locked. No other concerns, continue POC.

## 2021-04-03 ENCOUNTER — APPOINTMENT (OUTPATIENT)
Dept: PHYSICAL THERAPY | Facility: CLINIC | Age: 61
End: 2021-04-03
Payer: COMMERCIAL

## 2021-04-03 ENCOUNTER — APPOINTMENT (OUTPATIENT)
Dept: OCCUPATIONAL THERAPY | Facility: CLINIC | Age: 61
End: 2021-04-03
Payer: COMMERCIAL

## 2021-04-03 LAB
BACTERIA SPEC CULT: NO GROWTH
BACTERIA SPEC CULT: NO GROWTH
GLUCOSE BLDC GLUCOMTR-MCNC: 122 MG/DL (ref 70–99)
GLUCOSE BLDC GLUCOMTR-MCNC: 130 MG/DL (ref 70–99)
GLUCOSE BLDC GLUCOMTR-MCNC: 160 MG/DL (ref 70–99)
GLUCOSE BLDC GLUCOMTR-MCNC: 162 MG/DL (ref 70–99)
GLUCOSE BLDC GLUCOMTR-MCNC: 168 MG/DL (ref 70–99)
MAGNESIUM SERPL-MCNC: 1.9 MG/DL (ref 1.6–2.3)
POTASSIUM SERPL-SCNC: 3.3 MMOL/L (ref 3.4–5.3)
POTASSIUM SERPL-SCNC: 3.3 MMOL/L (ref 3.4–5.3)
SPECIMEN SOURCE: NORMAL
SPECIMEN SOURCE: NORMAL

## 2021-04-03 PROCEDURE — 250N000013 HC RX MED GY IP 250 OP 250 PS 637: Performed by: PHYSICAL MEDICINE & REHABILITATION

## 2021-04-03 PROCEDURE — 36415 COLL VENOUS BLD VENIPUNCTURE: CPT | Performed by: STUDENT IN AN ORGANIZED HEALTH CARE EDUCATION/TRAINING PROGRAM

## 2021-04-03 PROCEDURE — 250N000011 HC RX IP 250 OP 636: Performed by: STUDENT IN AN ORGANIZED HEALTH CARE EDUCATION/TRAINING PROGRAM

## 2021-04-03 PROCEDURE — 36592 COLLECT BLOOD FROM PICC: CPT | Performed by: PHYSICAL MEDICINE & REHABILITATION

## 2021-04-03 PROCEDURE — 250N000009 HC RX 250: Performed by: STUDENT IN AN ORGANIZED HEALTH CARE EDUCATION/TRAINING PROGRAM

## 2021-04-03 PROCEDURE — 97110 THERAPEUTIC EXERCISES: CPT | Mod: GO

## 2021-04-03 PROCEDURE — 83735 ASSAY OF MAGNESIUM: CPT | Performed by: STUDENT IN AN ORGANIZED HEALTH CARE EDUCATION/TRAINING PROGRAM

## 2021-04-03 PROCEDURE — 128N000003 HC R&B REHAB

## 2021-04-03 PROCEDURE — 84132 ASSAY OF SERUM POTASSIUM: CPT | Performed by: STUDENT IN AN ORGANIZED HEALTH CARE EDUCATION/TRAINING PROGRAM

## 2021-04-03 PROCEDURE — 97535 SELF CARE MNGMENT TRAINING: CPT | Mod: GO

## 2021-04-03 PROCEDURE — 97530 THERAPEUTIC ACTIVITIES: CPT | Mod: GP

## 2021-04-03 PROCEDURE — 999N001017 HC STATISTIC GLUCOSE BY METER IP

## 2021-04-03 PROCEDURE — 250N000013 HC RX MED GY IP 250 OP 250 PS 637: Performed by: STUDENT IN AN ORGANIZED HEALTH CARE EDUCATION/TRAINING PROGRAM

## 2021-04-03 PROCEDURE — 97116 GAIT TRAINING THERAPY: CPT | Mod: GP

## 2021-04-03 PROCEDURE — 84132 ASSAY OF SERUM POTASSIUM: CPT | Performed by: PHYSICAL MEDICINE & REHABILITATION

## 2021-04-03 RX ORDER — POTASSIUM CHLORIDE 750 MG/1
20 TABLET, EXTENDED RELEASE ORAL ONCE
Status: COMPLETED | OUTPATIENT
Start: 2021-04-03 | End: 2021-04-03

## 2021-04-03 RX ORDER — BENZONATATE 100 MG/1
100 CAPSULE ORAL 3 TIMES DAILY PRN
Status: DISCONTINUED | OUTPATIENT
Start: 2021-04-03 | End: 2021-04-08 | Stop reason: HOSPADM

## 2021-04-03 RX ADMIN — Medication 1 CAPSULE: at 20:34

## 2021-04-03 RX ADMIN — GUAIFENESIN AND DEXTROMETHORPHAN 5 ML: 100; 10 SYRUP ORAL at 06:10

## 2021-04-03 RX ADMIN — CEFPODOXIME PROXETIL 200 MG: 200 TABLET, FILM COATED ORAL at 09:17

## 2021-04-03 RX ADMIN — FERROUS SULFATE TAB 325 MG (65 MG ELEMENTAL FE) 325 MG: 325 (65 FE) TAB at 09:17

## 2021-04-03 RX ADMIN — TICAGRELOR 90 MG: 90 TABLET ORAL at 09:17

## 2021-04-03 RX ADMIN — SCOPALAMINE 1 PATCH: 1 PATCH, EXTENDED RELEASE TRANSDERMAL at 11:08

## 2021-04-03 RX ADMIN — PROCHLORPERAZINE MALEATE 5 MG: 5 TABLET ORAL at 09:38

## 2021-04-03 RX ADMIN — Medication 12.5 MG: at 20:34

## 2021-04-03 RX ADMIN — GUAIFENESIN AND DEXTROMETHORPHAN 5 ML: 100; 10 SYRUP ORAL at 22:15

## 2021-04-03 RX ADMIN — MAGNESIUM OXIDE TAB 400 MG (241.3 MG ELEMENTAL MG) 400 MG: 400 (241.3 MG) TAB at 09:16

## 2021-04-03 RX ADMIN — SODIUM BICARBONATE 650 MG TABLET 650 MG: at 09:16

## 2021-04-03 RX ADMIN — Medication 1 CAPSULE: at 09:17

## 2021-04-03 RX ADMIN — Medication 1 TABLET: at 09:17

## 2021-04-03 RX ADMIN — SODIUM BICARBONATE 650 MG TABLET 650 MG: at 17:32

## 2021-04-03 RX ADMIN — OMEPRAZOLE 20 MG: 20 CAPSULE, DELAYED RELEASE ORAL at 06:05

## 2021-04-03 RX ADMIN — OMEPRAZOLE 20 MG: 20 CAPSULE, DELAYED RELEASE ORAL at 16:17

## 2021-04-03 RX ADMIN — TICAGRELOR 90 MG: 90 TABLET ORAL at 20:34

## 2021-04-03 RX ADMIN — INSULIN ASPART 1 UNITS: 100 INJECTION, SOLUTION INTRAVENOUS; SUBCUTANEOUS at 17:32

## 2021-04-03 RX ADMIN — Medication 12.5 MG: at 09:17

## 2021-04-03 RX ADMIN — GUAIFENESIN AND DEXTROMETHORPHAN 5 ML: 100; 10 SYRUP ORAL at 09:16

## 2021-04-03 RX ADMIN — CALCIUM POLYCARBOPHIL 1250 MG: 625 TABLET, FILM COATED ORAL at 09:17

## 2021-04-03 RX ADMIN — HEPARIN, PORCINE (PF) 10 UNIT/ML INTRAVENOUS SYRINGE 10 ML: at 09:38

## 2021-04-03 RX ADMIN — ASPIRIN 81 MG CHEWABLE TABLET 81 MG: 81 TABLET CHEWABLE at 09:17

## 2021-04-03 RX ADMIN — POTASSIUM CHLORIDE 20 MEQ: 750 TABLET, EXTENDED RELEASE ORAL at 09:16

## 2021-04-03 RX ADMIN — GUAIFENESIN AND DEXTROMETHORPHAN 5 ML: 100; 10 SYRUP ORAL at 17:31

## 2021-04-03 RX ADMIN — MAGNESIUM OXIDE TAB 400 MG (241.3 MG ELEMENTAL MG) 400 MG: 400 (241.3 MG) TAB at 17:31

## 2021-04-03 RX ADMIN — INSULIN ASPART 1 UNITS: 100 INJECTION, SOLUTION INTRAVENOUS; SUBCUTANEOUS at 12:28

## 2021-04-03 RX ADMIN — HEPARIN, PORCINE (PF) 10 UNIT/ML INTRAVENOUS SYRINGE 5 ML: at 14:09

## 2021-04-03 RX ADMIN — BENZONATATE 100 MG: 100 CAPSULE ORAL at 22:15

## 2021-04-03 NOTE — PLAN OF CARE
Pt is alert and oriented x 4. Continent of bladder, no bm this shift. Denies pain, SOB, nausea or vomiting. Ax1 with walker and gait belt. Reg diet with 3 gm sodium restriction. CVC dressing CDI. Bed alarm on for safety, call light within reach. Will continue to monitor.

## 2021-04-03 NOTE — PROGRESS NOTES
[facility], [place]   Physical Medicine and Rehabilitation Daily Note           Assessment and Plan of Care:   Breana Boudreaux is a 61 year old woman with CAD s/p recent stenting 2/3/21, DM type 2, HTN, RLS, who presented 2/21/21 with shortness of breath found to be in SHYANNE with lactic acidosis, rhabdomyolysis, with other electrolyte abnormalities was started on dialysis, hospital stay complicated by GI bleed, UTI, thrombocytopenia,  discharged to LTAC 3/12 last dialysis 3/13. Admitted to acute rehab 3/26/21. Briefly discharged back to acute hospital 3/28-3/30, then readmitted to ARU on 3/30/21.    --Vitals stable.  Magnesium stable at 1.9, potassium stable at 3.3.  --Continue ongoing medical management.  --Continue therapies and plan of care.         Interval history:   No acute overnight events.  Patient overall slept well.  Complaining of cough this morning, was given Robitussin.  States her nausea is 6 slightly better today.  Stable back pain.  No incontinence reported.  Denies CP, SOB, V, abdominal pain, new pain or other acute concerns.             Physical Exam:     Vitals:    04/02/21 0739 04/02/21 0858 04/02/21 1637 04/02/21 2044   BP: 123/79 126/88 117/87 (!) 125/95   BP Location: Left arm  Left arm    Pulse: 99  107 104   Resp: 18  18    Temp: 97  F (36.1  C)  97.8  F (36.6  C)    TempSrc: Oral  Oral    SpO2: 99%  99%    Weight:       Height:         Gen: NAD, resting in bed  Heart: RRR  Lungs: clear breath sounds b/l  Abd: soft and non-tender  Ext: wwp, no tenderness in calves  MSK/neuro: alert and oriented. speech fluent. moves BUE and BLE volitionally.          Data:   Scheduled meds    aspirin  81 mg Oral Daily     calcium polycarbophil  1,250 mg Oral Daily     cefpodoxime  200 mg Oral Daily     ferrous sulfate  325 mg Oral Daily with breakfast     heparin lock flush  5-10 mL Intracatheter Q24H     insulin aspart  1-3 Units Subcutaneous TID AC     insulin aspart  1-3  Units Subcutaneous At Bedtime     lactobacillus rhamnosus (GG)  1 capsule Oral BID     lidocaine  1 patch Transdermal Q24h    And     lidocaine   Transdermal Q8H     magnesium oxide  400 mg Oral BID     metoprolol tartrate  12.5 mg Oral BID     multivitamin RENAL  1 tablet Oral Daily     omeprazole  20 mg Oral BID AC     potassium chloride  40 mEq Oral Once     psyllium  1 capsule Oral Daily     scopolamine  1 patch Transdermal Q72H    And     scopolamine   Transdermal Q8H     sodium bicarbonate  650 mg Oral BID     ticagrelor  90 mg Oral BID       PRN meds:  acetaminophen, glucose **OR** dextrose **OR** glucagon, guaiFENesin-dextromethorphan, heparin lock flush, ipratropium - albuterol 0.5 mg/2.5 mg/3 mL, melatonin, polyethylene glycol, prochlorperazine, senna-docusate, sodium chloride (PF)      Darian Bella MD  Physical Medicine and Rehabilitation

## 2021-04-03 NOTE — PLAN OF CARE
Pt is alert and oriented x 4. Continent of bladder, no bm this shift. Denied pain. Pt started coughing at 5 pm this shift and complained of being short of breath at the same time, robitussin given with no improvement. Consulted with provider and received order for tessalon, which was given to pt at 10:15 pm. Pt received potassium replacement this AM and lab came back 3.3. Provider aware, ok to wait until AM lab draw on 4/4. Ax1 with walker and gait belt. Reg diet with 3 gm sodium restriction. CVC dressing CDI. Bed alarm on for safety, call light within reach. Will continue to monitor.

## 2021-04-03 NOTE — PLAN OF CARE
Discharge Planner Post-Acute Rehab OT:      Discharge Plan: Home with HC vs OP OT services. Est. LOS: 10 days     Precautions: falls, hx of chronic low back pain     Current Status:  ADLs: Pt requires SBA with donning shirt seated and min A with LBD tasks with FWW. Pt requires CGA with STS with FWW and CGA with functional mobility with FWW in BR. Pt requires CGA with toilet transfer with FWW. Pt requires CGA with G/H tasks standing at EOS with FWW. Pt requires CGA with W/C<>extended tub bench SPT and assistance to wash/rinse/dry 2/10 body parts seated.   IADLs: Will further assess. Pt has supportive family to assist upon discharge.   Vision/Cognition: Pt wears reading glasses. Will further assess cognition.      Assessment: Pt w/improved functional endurance for ADL task, completed sponge bath while seated EOB w/SBA, maxA to don pants d/t decreased flexibility- will initiate AE next session. Tolerated UE exercises while long-sitting in bed, shortened session d/t nausea.      Other Barriers to Discharge (DME, Family Training, etc): Family training prior to discharge. Pt has grab bars in bathroom and a shower chair.      -15 d/t fatigue and nausea.

## 2021-04-03 NOTE — PLAN OF CARE
FOCUS/GOAL  Medical management    ASSESSMENT, INTERVENTIONS AND CONTINUING PLAN FOR GOAL:  Patient slept between cares. No c/o pain. C/O coughing gave her Robitussin DM at 0610. She called for assistance, she is Hmong speaker, speaks minimal English, she was able to communicate her needs using simple phrases. There is an I Pad in her room. She used the bathroom 2 times, she needed minimal assistance to sit at the EOB, CGA for standing up and ambulation with walker. No incontinence, she voided and had a BM twice.  She has a double lumen central line right upper chest.  Unable to locate scopolamine patch behind patient ears, next dose is due tomorrow, she has an order for PRN compazine. No c/o nausea/emesis tonight.

## 2021-04-03 NOTE — PLAN OF CARE
FOCUS/GOAL  Nutrition/Feeding/Swallowing precautions, Medication management, and Medical management    ASSESSMENT, INTERVENTIONS AND CONTINUING PLAN FOR GOAL:  Pt is alert and oriented x4. Is still having complaints of persistent nausea. Had a small emesis after a spoonful of crushed meds given in applesauce. Compazine patch placed behind right ear as the other one fell off. Also given PRN compazine. Only ate small amount of meal, about 25%. Also given robitussin for cough. CGA with walker for transfers. Potassium replacement given per protocol, recheck still low at 3.3. Next nurse aware that pt need additional replacement dose.

## 2021-04-03 NOTE — PLAN OF CARE
Discharge Planner Post-Acute Rehab PT:      Discharge Plan: Home with family assist prn, HCPT, ~4/9/21     Precautions: Falls, lower back pain, nausea  Needs Hmong  (iPad in room)     Current Status:  Bed Mobility: Min A at BLE in/out of bed  Transfer: stand pivot with WW, CGA  Gait: 100 ft with 4WW, CGA  Stairs: Not yet tested  Balance: Needs WW support during ambulation     Assessment: Worsening nausea today with emesis. Pt amb up to 100 ft in single bout - denies back pain. Very deconditioned. HR 94 with ambulation. SOB and needing time to recover.      Other Barriers to Discharge (DME, Family Training, etc): None anticipated, WW for home

## 2021-04-04 LAB
GLUCOSE BLDC GLUCOMTR-MCNC: 104 MG/DL (ref 70–99)
GLUCOSE BLDC GLUCOMTR-MCNC: 116 MG/DL (ref 70–99)
GLUCOSE BLDC GLUCOMTR-MCNC: 126 MG/DL (ref 70–99)
GLUCOSE BLDC GLUCOMTR-MCNC: 91 MG/DL (ref 70–99)
POTASSIUM SERPL-SCNC: 3 MMOL/L (ref 3.4–5.3)
POTASSIUM SERPL-SCNC: 3.9 MMOL/L (ref 3.4–5.3)

## 2021-04-04 PROCEDURE — 999N000157 HC STATISTIC RCP TIME EA 10 MIN

## 2021-04-04 PROCEDURE — 128N000003 HC R&B REHAB

## 2021-04-04 PROCEDURE — 84132 ASSAY OF SERUM POTASSIUM: CPT | Performed by: PHYSICAL MEDICINE & REHABILITATION

## 2021-04-04 PROCEDURE — 36415 COLL VENOUS BLD VENIPUNCTURE: CPT | Performed by: PHYSICAL MEDICINE & REHABILITATION

## 2021-04-04 PROCEDURE — 36592 COLLECT BLOOD FROM PICC: CPT | Performed by: PHYSICAL MEDICINE & REHABILITATION

## 2021-04-04 PROCEDURE — 94640 AIRWAY INHALATION TREATMENT: CPT

## 2021-04-04 PROCEDURE — 999N001017 HC STATISTIC GLUCOSE BY METER IP

## 2021-04-04 PROCEDURE — 250N000011 HC RX IP 250 OP 636: Performed by: STUDENT IN AN ORGANIZED HEALTH CARE EDUCATION/TRAINING PROGRAM

## 2021-04-04 PROCEDURE — 250N000009 HC RX 250: Performed by: STUDENT IN AN ORGANIZED HEALTH CARE EDUCATION/TRAINING PROGRAM

## 2021-04-04 PROCEDURE — 250N000013 HC RX MED GY IP 250 OP 250 PS 637: Performed by: STUDENT IN AN ORGANIZED HEALTH CARE EDUCATION/TRAINING PROGRAM

## 2021-04-04 PROCEDURE — 250N000013 HC RX MED GY IP 250 OP 250 PS 637: Performed by: PHYSICAL MEDICINE & REHABILITATION

## 2021-04-04 RX ORDER — POTASSIUM CHLORIDE 1.5 G/1.58G
40 POWDER, FOR SOLUTION ORAL ONCE
Status: COMPLETED | OUTPATIENT
Start: 2021-04-04 | End: 2021-04-04

## 2021-04-04 RX ADMIN — GUAIFENESIN AND DEXTROMETHORPHAN 5 ML: 100; 10 SYRUP ORAL at 19:30

## 2021-04-04 RX ADMIN — GUAIFENESIN AND DEXTROMETHORPHAN 5 ML: 100; 10 SYRUP ORAL at 03:05

## 2021-04-04 RX ADMIN — Medication 1 CAPSULE: at 08:30

## 2021-04-04 RX ADMIN — Medication 1 CAPSULE: at 21:28

## 2021-04-04 RX ADMIN — IPRATROPIUM BROMIDE AND ALBUTEROL SULFATE 3 ML: .5; 3 SOLUTION RESPIRATORY (INHALATION) at 06:31

## 2021-04-04 RX ADMIN — FERROUS SULFATE TAB 325 MG (65 MG ELEMENTAL FE) 325 MG: 325 (65 FE) TAB at 08:30

## 2021-04-04 RX ADMIN — BENZONATATE 100 MG: 100 CAPSULE ORAL at 16:02

## 2021-04-04 RX ADMIN — BENZONATATE 100 MG: 100 CAPSULE ORAL at 19:30

## 2021-04-04 RX ADMIN — HEPARIN, PORCINE (PF) 10 UNIT/ML INTRAVENOUS SYRINGE 5 ML: at 11:50

## 2021-04-04 RX ADMIN — CALCIUM POLYCARBOPHIL 1250 MG: 625 TABLET, FILM COATED ORAL at 08:29

## 2021-04-04 RX ADMIN — SODIUM BICARBONATE 650 MG TABLET 650 MG: at 17:55

## 2021-04-04 RX ADMIN — MELATONIN TAB 3 MG 3 MG: 3 TAB at 03:12

## 2021-04-04 RX ADMIN — MAGNESIUM OXIDE TAB 400 MG (241.3 MG ELEMENTAL MG) 400 MG: 400 (241.3 MG) TAB at 08:30

## 2021-04-04 RX ADMIN — SODIUM BICARBONATE 650 MG TABLET 650 MG: at 08:29

## 2021-04-04 RX ADMIN — OMEPRAZOLE 20 MG: 20 CAPSULE, DELAYED RELEASE ORAL at 16:03

## 2021-04-04 RX ADMIN — Medication 12.5 MG: at 08:30

## 2021-04-04 RX ADMIN — HEPARIN, PORCINE (PF) 10 UNIT/ML INTRAVENOUS SYRINGE 10 ML: at 11:20

## 2021-04-04 RX ADMIN — TICAGRELOR 90 MG: 90 TABLET ORAL at 08:30

## 2021-04-04 RX ADMIN — BENZONATATE 100 MG: 100 CAPSULE ORAL at 08:39

## 2021-04-04 RX ADMIN — ASPIRIN 81 MG CHEWABLE TABLET 81 MG: 81 TABLET CHEWABLE at 08:30

## 2021-04-04 RX ADMIN — TICAGRELOR 90 MG: 90 TABLET ORAL at 21:28

## 2021-04-04 RX ADMIN — GUAIFENESIN AND DEXTROMETHORPHAN 5 ML: 100; 10 SYRUP ORAL at 08:39

## 2021-04-04 RX ADMIN — Medication 1 TABLET: at 08:29

## 2021-04-04 RX ADMIN — MAGNESIUM OXIDE TAB 400 MG (241.3 MG ELEMENTAL MG) 400 MG: 400 (241.3 MG) TAB at 17:55

## 2021-04-04 RX ADMIN — Medication 12.5 MG: at 21:28

## 2021-04-04 RX ADMIN — POTASSIUM CHLORIDE 40 MEQ: 1.5 POWDER, FOR SOLUTION ORAL at 08:29

## 2021-04-04 RX ADMIN — PROCHLORPERAZINE MALEATE 5 MG: 5 TABLET ORAL at 17:55

## 2021-04-04 RX ADMIN — OMEPRAZOLE 20 MG: 20 CAPSULE, DELAYED RELEASE ORAL at 06:20

## 2021-04-04 NOTE — PLAN OF CARE
FOCUS/GOAL  Medical management    ASSESSMENT, INTERVENTIONS AND CONTINUING PLAN FOR GOAL:  Patient called for assistance, able to communicate her needs. She understand simple English, able to communicate using simple words or short sentences. She is Hmong speaker. No c/o pain, but c/o not sleeping well, requested Melatonin, given at 0310, she was found asleep at 0410. She also c/o nausea and had a small emesis, offered her compazine, she declined.   She c/o chest congestion and frequent cough, some SOB with activity, cough not relieved with Robitussin. Her lungs sounds were clear, diminished in lower bases, O2 sat 100% on room air. At 0600, she continued to c/o feeling congested, called RT, she was given a nebulizer treatment with relief.   She is independent with bed mobility, transfer and ambulation to the bathroom with CGA and a walker, performed jyoti cares with supervision and clothing management with steadying assistance.

## 2021-04-04 NOTE — PROGRESS NOTES
[facility], [place]   Physical Medicine and Rehabilitation Daily Note           Assessment and Plan of Care:   Breana Boudreaux is a 61 year old woman with CAD s/p recent stenting 2/3/21, DM type 2, HTN, RLS, who presented 2/21/21 with shortness of breath found to be in SHYANNE with lactic acidosis, rhabdomyolysis, with other electrolyte abnormalities was started on dialysis, hospital stay complicated by GI bleed, UTI, thrombocytopenia,  discharged to LTAC 3/12 last dialysis 3/13. Admitted to acute rehab 3/26/21. Briefly discharged back to acute hospital 3/28-3/30, then readmitted to ARU on 3/30/21.    --Vitals stable. K 3.0 - repelete per protocol.  --Continue ongoing medical management. Added tessalon for cough. It is thought this is due to her underlying reflux, so also encouraged patient to be more upright.  --Continue therapies and plan of care.         Interval history:   No acute overnight events. Told nursing she didn't sleep well.  Continues to complain of cough when flat and nausea when more upright. Cough is dry. Not well relieved with robitussin. Did improve with nebulizer treatment. Denies CP, SOB, V, abdominal pain, new pain or other acute concerns.          Physical Exam:     Vitals:    04/03/21 2034 04/04/21 0314 04/04/21 0631 04/04/21 0826   BP: (!) 144/78   111/72   BP Location:    Right arm   Pulse:    87   Resp:    24   Temp:    98.5  F (36.9  C)   TempSrc:    Oral   SpO2:  100% 98% 97%   Weight:       Height:         Gen: NAD, resting in bed, looks tired  Heart: RRR  Lungs: clear breath sounds b/l  Abd: soft and non-tender  Ext: wwp, no tenderness in calves  MSK/neuro: alert and oriented. speech fluent. moves BUE and BLE volitionally.          Data:   Scheduled meds    aspirin  81 mg Oral Daily     calcium polycarbophil  1,250 mg Oral Daily     ferrous sulfate  325 mg Oral Daily with breakfast     heparin lock flush  5-10 mL Intracatheter Q24H     insulin aspart  1-3  Units Subcutaneous TID AC     insulin aspart  1-3 Units Subcutaneous At Bedtime     lactobacillus rhamnosus (GG)  1 capsule Oral BID     lidocaine  1 patch Transdermal Q24h    And     lidocaine   Transdermal Q8H     magnesium oxide  400 mg Oral BID     metoprolol tartrate  12.5 mg Oral BID     multivitamin RENAL  1 tablet Oral Daily     omeprazole  20 mg Oral BID AC     psyllium  1 capsule Oral Daily     scopolamine  1 patch Transdermal Q72H    And     scopolamine   Transdermal Q8H     sodium bicarbonate  650 mg Oral BID     ticagrelor  90 mg Oral BID       PRN meds:  acetaminophen, benzonatate, glucose **OR** dextrose **OR** glucagon, guaiFENesin-dextromethorphan, heparin lock flush, ipratropium - albuterol 0.5 mg/2.5 mg/3 mL, melatonin, polyethylene glycol, prochlorperazine, senna-docusate, sodium chloride (PF)      Darian Bella MD  Physical Medicine and Rehabilitation

## 2021-04-04 NOTE — PLAN OF CARE
FOCUS/GOAL  Bowel management, Bladder management, Medication management, and Mobility    ASSESSMENT, INTERVENTIONS AND CONTINUING PLAN FOR GOAL:  Pt is alert and oriented x4. Denies pain, is still having intermittent nausea, pt states it is better when laying down. Given PRN robitussen for cough as well as tessalon. Meals strongly encouraged as much as possible. Pt only eating 25% of breakfast. Is cont of bowel and bladder using toilet. Had a small BM today as well. Also given potassium replacement of 40mEq for potassium level of 3.0, recheck of level was 3.9 after 4 hours. Transfers with CGA and walker. Meds given whole with water because the pt became very nauseous and had emesis yesterday when given crushed in applesauce.

## 2021-04-04 NOTE — PLAN OF CARE
Pt is alert and oriented x 4. Continent of bladder, had a bm this shift. Denied pain or  SOB. Pt complained of nausea, managed with compazine. Pt continues to have cough, prn tessalon given which helped. Advised pt to keep head of bed at above 45 degrees. Ax1 with walker and gait belt. Reg diet with 3 gm sodium restriction. CVC dressing CDI. Bed alarm on for safety, call light within reach. Will continue to monitor.

## 2021-04-05 ENCOUNTER — APPOINTMENT (OUTPATIENT)
Dept: SPEECH THERAPY | Facility: CLINIC | Age: 61
End: 2021-04-05
Payer: COMMERCIAL

## 2021-04-05 ENCOUNTER — APPOINTMENT (OUTPATIENT)
Dept: PHYSICAL THERAPY | Facility: CLINIC | Age: 61
End: 2021-04-05
Payer: COMMERCIAL

## 2021-04-05 ENCOUNTER — APPOINTMENT (OUTPATIENT)
Dept: OCCUPATIONAL THERAPY | Facility: CLINIC | Age: 61
End: 2021-04-05
Payer: COMMERCIAL

## 2021-04-05 LAB
ANION GAP SERPL CALCULATED.3IONS-SCNC: 6 MMOL/L (ref 3–14)
BUN SERPL-MCNC: 15 MG/DL (ref 7–30)
CALCIUM SERPL-MCNC: 8.1 MG/DL (ref 8.5–10.1)
CHLORIDE SERPL-SCNC: 107 MMOL/L (ref 94–109)
CO2 SERPL-SCNC: 25 MMOL/L (ref 20–32)
CREAT SERPL-MCNC: 2.58 MG/DL (ref 0.52–1.04)
ERYTHROCYTE [DISTWIDTH] IN BLOOD BY AUTOMATED COUNT: 18.2 % (ref 10–15)
GFR SERPL CREATININE-BSD FRML MDRD: 19 ML/MIN/{1.73_M2}
GLUCOSE BLDC GLUCOMTR-MCNC: 115 MG/DL (ref 70–99)
GLUCOSE BLDC GLUCOMTR-MCNC: 122 MG/DL (ref 70–99)
GLUCOSE BLDC GLUCOMTR-MCNC: 122 MG/DL (ref 70–99)
GLUCOSE BLDC GLUCOMTR-MCNC: 138 MG/DL (ref 70–99)
GLUCOSE SERPL-MCNC: 90 MG/DL (ref 70–99)
HCT VFR BLD AUTO: 25.1 % (ref 35–47)
HGB BLD-MCNC: 7.9 G/DL (ref 11.7–15.7)
MAGNESIUM SERPL-MCNC: 2 MG/DL (ref 1.6–2.3)
MCH RBC QN AUTO: 31.1 PG (ref 26.5–33)
MCHC RBC AUTO-ENTMCNC: 31.5 G/DL (ref 31.5–36.5)
MCV RBC AUTO: 99 FL (ref 78–100)
PLATELET # BLD AUTO: 324 10E9/L (ref 150–450)
POTASSIUM SERPL-SCNC: 3.1 MMOL/L (ref 3.4–5.3)
RBC # BLD AUTO: 2.54 10E12/L (ref 3.8–5.2)
SODIUM SERPL-SCNC: 138 MMOL/L (ref 133–144)
WBC # BLD AUTO: 8 10E9/L (ref 4–11)

## 2021-04-05 PROCEDURE — 97110 THERAPEUTIC EXERCISES: CPT | Mod: GO | Performed by: OCCUPATIONAL THERAPIST

## 2021-04-05 PROCEDURE — 97530 THERAPEUTIC ACTIVITIES: CPT | Mod: GO | Performed by: OCCUPATIONAL THERAPIST

## 2021-04-05 PROCEDURE — 250N000013 HC RX MED GY IP 250 OP 250 PS 637: Performed by: PHYSICIAN ASSISTANT

## 2021-04-05 PROCEDURE — 250N000013 HC RX MED GY IP 250 OP 250 PS 637: Performed by: STUDENT IN AN ORGANIZED HEALTH CARE EDUCATION/TRAINING PROGRAM

## 2021-04-05 PROCEDURE — 250N000011 HC RX IP 250 OP 636: Performed by: STUDENT IN AN ORGANIZED HEALTH CARE EDUCATION/TRAINING PROGRAM

## 2021-04-05 PROCEDURE — 999N001017 HC STATISTIC GLUCOSE BY METER IP

## 2021-04-05 PROCEDURE — 36592 COLLECT BLOOD FROM PICC: CPT | Performed by: STUDENT IN AN ORGANIZED HEALTH CARE EDUCATION/TRAINING PROGRAM

## 2021-04-05 PROCEDURE — 83735 ASSAY OF MAGNESIUM: CPT | Performed by: STUDENT IN AN ORGANIZED HEALTH CARE EDUCATION/TRAINING PROGRAM

## 2021-04-05 PROCEDURE — 97535 SELF CARE MNGMENT TRAINING: CPT | Mod: GO | Performed by: OCCUPATIONAL THERAPIST

## 2021-04-05 PROCEDURE — 97530 THERAPEUTIC ACTIVITIES: CPT | Mod: GP

## 2021-04-05 PROCEDURE — 80048 BASIC METABOLIC PNL TOTAL CA: CPT | Performed by: STUDENT IN AN ORGANIZED HEALTH CARE EDUCATION/TRAINING PROGRAM

## 2021-04-05 PROCEDURE — 97110 THERAPEUTIC EXERCISES: CPT | Mod: GP

## 2021-04-05 PROCEDURE — 85027 COMPLETE CBC AUTOMATED: CPT | Performed by: STUDENT IN AN ORGANIZED HEALTH CARE EDUCATION/TRAINING PROGRAM

## 2021-04-05 PROCEDURE — 128N000003 HC R&B REHAB

## 2021-04-05 PROCEDURE — 92526 ORAL FUNCTION THERAPY: CPT | Mod: GN

## 2021-04-05 PROCEDURE — 99232 SBSQ HOSP IP/OBS MODERATE 35: CPT | Performed by: PHYSICAL MEDICINE & REHABILITATION

## 2021-04-05 RX ORDER — POTASSIUM CHLORIDE 750 MG/1
20 TABLET, EXTENDED RELEASE ORAL DAILY
Status: DISCONTINUED | OUTPATIENT
Start: 2021-04-06 | End: 2021-04-07

## 2021-04-05 RX ORDER — POTASSIUM CHLORIDE 750 MG/1
20 TABLET, EXTENDED RELEASE ORAL ONCE
Status: COMPLETED | OUTPATIENT
Start: 2021-04-05 | End: 2021-04-05

## 2021-04-05 RX ORDER — FAMOTIDINE 20 MG/1
20 TABLET, FILM COATED ORAL DAILY
Status: DISCONTINUED | OUTPATIENT
Start: 2021-04-05 | End: 2021-04-08 | Stop reason: HOSPADM

## 2021-04-05 RX ADMIN — POTASSIUM CHLORIDE 20 MEQ: 750 TABLET, EXTENDED RELEASE ORAL at 08:45

## 2021-04-05 RX ADMIN — ASPIRIN 81 MG CHEWABLE TABLET 81 MG: 81 TABLET CHEWABLE at 08:43

## 2021-04-05 RX ADMIN — HEPARIN, PORCINE (PF) 10 UNIT/ML INTRAVENOUS SYRINGE 5 ML: at 11:20

## 2021-04-05 RX ADMIN — Medication 1 TABLET: at 08:43

## 2021-04-05 RX ADMIN — GUAIFENESIN AND DEXTROMETHORPHAN 5 ML: 100; 10 SYRUP ORAL at 07:50

## 2021-04-05 RX ADMIN — GUAIFENESIN AND DEXTROMETHORPHAN 5 ML: 100; 10 SYRUP ORAL at 14:43

## 2021-04-05 RX ADMIN — LIDOCAINE 1 PATCH: 560 PATCH PERCUTANEOUS; TOPICAL; TRANSDERMAL at 21:44

## 2021-04-05 RX ADMIN — Medication 1 CAPSULE: at 08:43

## 2021-04-05 RX ADMIN — Medication 12.5 MG: at 08:43

## 2021-04-05 RX ADMIN — FAMOTIDINE 20 MG: 20 TABLET ORAL at 11:20

## 2021-04-05 RX ADMIN — TICAGRELOR 90 MG: 90 TABLET ORAL at 21:43

## 2021-04-05 RX ADMIN — HEPARIN, PORCINE (PF) 10 UNIT/ML INTRAVENOUS SYRINGE 5 ML: at 05:31

## 2021-04-05 RX ADMIN — CALCIUM POLYCARBOPHIL 1250 MG: 625 TABLET, FILM COATED ORAL at 08:44

## 2021-04-05 RX ADMIN — Medication 1 CAPSULE: at 21:43

## 2021-04-05 RX ADMIN — MAGNESIUM OXIDE TAB 400 MG (241.3 MG ELEMENTAL MG) 400 MG: 400 (241.3 MG) TAB at 17:47

## 2021-04-05 RX ADMIN — SODIUM BICARBONATE 650 MG TABLET 650 MG: at 17:47

## 2021-04-05 RX ADMIN — OMEPRAZOLE 40 MG: 20 CAPSULE, DELAYED RELEASE ORAL at 17:47

## 2021-04-05 RX ADMIN — FERROUS SULFATE TAB 325 MG (65 MG ELEMENTAL FE) 325 MG: 325 (65 FE) TAB at 08:43

## 2021-04-05 RX ADMIN — Medication 12.5 MG: at 21:44

## 2021-04-05 RX ADMIN — OMEPRAZOLE 20 MG: 20 CAPSULE, DELAYED RELEASE ORAL at 06:30

## 2021-04-05 RX ADMIN — TICAGRELOR 90 MG: 90 TABLET ORAL at 08:43

## 2021-04-05 RX ADMIN — SODIUM BICARBONATE 650 MG TABLET 650 MG: at 11:21

## 2021-04-05 RX ADMIN — MAGNESIUM OXIDE TAB 400 MG (241.3 MG ELEMENTAL MG) 400 MG: 400 (241.3 MG) TAB at 11:21

## 2021-04-05 RX ADMIN — BENZONATATE 100 MG: 100 CAPSULE ORAL at 17:53

## 2021-04-05 NOTE — PLAN OF CARE
FOCUS/GOAL  Bowel management, Bladder management, Pain management, Mobility, Skin integrity, and Safety management    ASSESSMENT, INTERVENTIONS AND CONTINUING PLAN FOR GOAL:  A/O, VS stable. Regular/thin, pills whole. , 138. Ate most of meal. Continent of bladder, incontinent of bowel. Last BM today. No complaints of pain this shift. CGA with walker to transfer. No current skin concerns. CVC dressing CDI. Calls appropriately with light. Continue POC.

## 2021-04-05 NOTE — PLAN OF CARE
Speech Language Therapy Discharge Summary    Reason for therapy discharge:    All goals and outcomes met, no further needs identified.    Progress towards therapy goal(s). See goals on Care Plan in [facility] electronic health record for goal details.  Goals met    Therapy recommendation(s):    No further therapy is recommended.     Patient was initially seen for nausea/vomiting after all food and medication intake. Patient had complaints about food textures that were too difficult for her as they continually made her nauseous. After being watched for multiple meals, esophogram was suggested. Esophagram done on 4/2/2021. Due to esophogram findings and patient report, patient does not have any further dysphagia concerns. Patient was educated for reflux on ways to alleviate discomfort.

## 2021-04-05 NOTE — PLAN OF CARE
FOCUS/GOAL  Medical management    ASSESSMENT, INTERVENTIONS AND CONTINUING PLAN FOR GOAL:  Pt is alert, speak w/ limited english, able to tell when she needs to use the BR. CGA1 gaitbelt w/ walker for ambulation to the BR. Continent of bladder. Min A1 to pull down/up brief, independent w/ jyoti cares. No complain of pain and no sob noted. Slept intermittently.

## 2021-04-05 NOTE — PROGRESS NOTES
Discharge Planner Post-Acute Rehab OT:      Discharge Plan: Home with HC vs OP OT services. Est. LOS: 10 days     Precautions: falls, hx of chronic low back pain     Current Status:  ADLs: Pt requires set-up with donning shirt seated and min A with LBD tasks with FWW. Pt requires SBACGA with STS with FWW pending surface height and SBA with functional mobility with FWW in BR. Pt requires SBA with toilet transfer with FWW. Pt requires SBA with G/H tasks standing at EOS with FWW. Pt limited by back pain with standing tolerance.   IADLs: Will further assess. Pt has supportive family to assist upon discharge.   Vision/Cognition: Pt wears reading glasses. Will further assess cognition.      Assessment: Pt continues to be limited by low back pain and coughing during OT session and req'd encouragement to participate. Pt requires SBA with toileting with FWW and continues to require min A with LBD tasks. Pt would benefit from skilled OT services to maximize IND and safety with ADLs/IADLs and functional mobility.      Other Barriers to Discharge (DME, Family Training, etc): Family training prior to discharge. Pt has grab bars in bathroom and a shower chair.     -5 d/t low back pain

## 2021-04-05 NOTE — PROGRESS NOTES
[facility]   Acute Rehabilitation Unit  Daily progress note    INTERVAL HISTORY  Patient seen and examined this AM.  No acute overnight events.  Denies chest pain, no fever or chills and no abdominal pain.  Does have lingering cough, but it is not new issue.  Discussed exercise regimen and starting incentive spirometer.  Labs reviewed, show no acute issues, except hypokalemia.  She is getting potasium supplement.      Function:  OT:  ADLs: Pt requires set-up with donning shirt seated and min A with LBD tasks with FWW. Pt requires SBACGA with STS with FWW pending surface height and SBA with functional mobility with FWW in BR. Pt requires SBA with toilet transfer with FWW. Pt requires SBA with G/H tasks standing at EOS with FWW. Pt limited by back pain with standing tolerance.   IADLs: Will further assess. Pt has supportive family to assist upon discharge.   Vision/Cognition: Pt wears reading glasses. Will further assess cognition.      ROS: 10 point ROS neg other than the symptoms noted above in the HPI.        MEDICATIONS  Scheduled meds    aspirin  81 mg Oral Daily     calcium polycarbophil  1,250 mg Oral Daily     famotidine  20 mg Oral Daily     ferrous sulfate  325 mg Oral Daily with breakfast     heparin lock flush  5-10 mL Intracatheter Q24H     insulin aspart  1-3 Units Subcutaneous TID AC     insulin aspart  1-3 Units Subcutaneous At Bedtime     lactobacillus rhamnosus (GG)  1 capsule Oral BID     lidocaine  1 patch Transdermal Q24h    And     lidocaine   Transdermal Q8H     magnesium oxide  400 mg Oral BID     metoprolol tartrate  12.5 mg Oral BID     multivitamin RENAL  1 tablet Oral Daily     omeprazole  40 mg Oral BID AC     [START ON 4/6/2021] potassium chloride  20 mEq Oral Daily     psyllium  1 capsule Oral Daily     scopolamine  1 patch Transdermal Q72H    And     scopolamine   Transdermal Q8H     sodium bicarbonate  650 mg Oral BID     ticagrelor  90 mg Oral  "BID     PRN meds:  acetaminophen, benzonatate, glucose **OR** dextrose **OR** glucagon, guaiFENesin-dextromethorphan, heparin lock flush, ipratropium - albuterol 0.5 mg/2.5 mg/3 mL, melatonin, polyethylene glycol, prochlorperazine, senna-docusate, sodium chloride (PF)    PHYSICAL EXAM  /76 (BP Location: Left arm)   Pulse 97   Temp 98.6  F (37  C) (Oral)   Resp 24   Ht 1.499 m (4' 11\")   Wt 56 kg (123 lb 6.4 oz)   SpO2 96%   BMI 24.92 kg/m    Gen: Pleasant female, sitting at bdeside  HEENT: NCAT, EOMI  Cardio: RRR, normal S1/S2  Pulm: CTAB, non labored   Abd: Soft, ND, NT, BS+  Ext: No LE tenderness  Neuro/MSK: Speech clear and fluent per , moving limbs antigravity,  strength symmetric.    LABS    Lab Results   Component Value Date    WBC 8.0 04/05/2021     Lab Results   Component Value Date    RBC 2.54 04/05/2021     Lab Results   Component Value Date    HGB 7.9 04/05/2021     Lab Results   Component Value Date    HCT 25.1 04/05/2021     Lab Results   Component Value Date    MCV 99 04/05/2021     Lab Results   Component Value Date    MCH 31.1 04/05/2021     Lab Results   Component Value Date    MCHC 31.5 04/05/2021     Lab Results   Component Value Date    RDW 18.2 04/05/2021     Lab Results   Component Value Date     04/05/2021     Last Comprehensive Metabolic Panel:  Sodium   Date Value Ref Range Status   04/05/2021 138 133 - 144 mmol/L Final     Potassium   Date Value Ref Range Status   04/05/2021 3.1 (L) 3.4 - 5.3 mmol/L Final     Chloride   Date Value Ref Range Status   04/05/2021 107 94 - 109 mmol/L Final     Carbon Dioxide   Date Value Ref Range Status   04/05/2021 25 20 - 32 mmol/L Final     Anion Gap   Date Value Ref Range Status   04/05/2021 6 3 - 14 mmol/L Final     Glucose   Date Value Ref Range Status   04/05/2021 90 70 - 99 mg/dL Final     Urea Nitrogen   Date Value Ref Range Status   04/05/2021 15 7 - 30 mg/dL Final     Creatinine   Date Value Ref Range Status "   04/05/2021 2.58 (H) 0.52 - 1.04 mg/dL Final     GFR Estimate   Date Value Ref Range Status   04/05/2021 19 (L) >60 mL/min/[1.73_m2] Final     Comment:     Non  GFR Calc  Starting 12/18/2018, serum creatinine based estimated GFR (eGFR) will be   calculated using the Chronic Kidney Disease Epidemiology Collaboration   (CKD-EPI) equation.       Calcium   Date Value Ref Range Status   04/05/2021 8.1 (L) 8.5 - 10.1 mg/dL Final         ASSESSMENT AND PLAN  Breana Boudreaux is a 61 year old woman with CAD s/p recent stenting 2/3/21, DM type 2, HTN, RLS, who presented 2/21/21 with shortness of breath found to be in SHYANNE with lactic acidosis, rhabdomyolysis, with other electrolyte abnormalities was started on dialysis, hospital stay complicated by GI bleed, UTI, thrombocytopenia,  discharged to LTAC 3/12 last dialysis 3/13. Admitted to acute rehab 3/26/21. Briefly discharged back to acute hospital 3/28-3/30, then readmitted to ARU on 3/30/21.     Admission to acute inpatient rehab rhabdomyolysis  Impairment group code: 03.8     1. PT, OT 90 minutes of each on a daily basis, in addition to rehab nursing and close management of physiatrist.       2. Impairment of ADL's: Noted to have impaired strength, impaired activity tolerance, leading to decreased ability to independently complete ADL's.  Will benefit from ongoing OT with goal for MOD I with basic ADLs.      3. Impairment of mobility:  Noted to have impaired strength, impaired activity tolerance, and impaired balance leading to decreased mobility.  Will benefit from ongoing PT with goal for CHERYL with basic mobility.      4. Medical Conditions  Acute Kidney Injury  Lactic acidosis-   Rhabdomyolysis  Presented 2/23/21 with SHYANNE, lactic acidosis, rhabdo CK 80,000 requiring HD felt to be secondary to statin. Last HD 3/13  -trend weights, intake/output  -trend BMP  -continue to hold statin  -continue to hold metformin  -started on sodium bicarb 650 bid 3/26  per discharging facility     Urosepsis  Possible Healthcare Acquired Pneumonia  - Cefpodoxime 200 mg qDay x4 days     CAD- s/p PCI 2/3/21 on brilinta and asa.  -continue dual antiplatelet therapy  -metoprolol 12.5 mg bid dose reduced 3/26     HTN- with hypotension during LTAC stay with metoprolol dose reduced 3/26.   -monitor BP  -continue metoprolol 12.5 mg bid     Nausea/vomiting- reports ongoing since hospitalization 2/21 told was in setting of renal failure though has not improved per her report. Last QTc 3/16 486.    -ppi as above  -4/1: Scopolamine patch ordered  -encourage intake  -prn compazine     QTc- mildly prolonged 486 3/16  -routine EKG eval Qtc-      Hypomagnesemia, Hypokalemia  - continue mag oxide  - 4/1: Replete 40 mEq K; 4/2 order replacement protocol  -Mag WNL today, however, K still low, will contiue supplement.      Recent GI bleed- with edg and flex sig 2/22/21 with resolution.   -monitor  -continue ppi bid     Anemia- in setting of critical illness, renal failure.  Hgb 7.3 3/30  -continue iron supplement daily  -trend hgb     Leucocytosis-  WBC 8.5 3/30  -trend  -monitor for signs/symptoms of infection     DM type 2- Hgb A1c 2/21/21 9.2% previously on metformin, lantus, metformin held in setting of recent lactic acidosis. bg overall stable on ssi, though intake reported as poor due to nausea/ vomiting  -check bg qid  -ssi- will taper off or add long acting pending bg levels during rehab stay.       5. Adjustment to disability:  Clinical psychology to eval and treat as indicated  6. FEN: regular- cardiac- though appetite poor and n/v  7. Bowel: Schedule pysllium in attempt to firm up stools. If patient remains incontinent, will continue starting bowel program. Will also order probiotic as patient has been on antibiotics  8. Bladder: monitor as recovering renal failure  9. DVT Prophylaxis: mechanical  10. GI Prophylaxis: ppi  bid  11. Code: full  12. Disposition: home  13. ELOS: 4/9/21  14. Rehab prognosis:  good  15. Follow up Appointments on Discharge: pcp, cardiology        Chip Napier, DO  Physical Medicine & Rehabilitation        I spent a total of 25 minutes face to face and coordinating care of Breana Boudreaux. Over 50% of my time on the unit was spent counseling the patient and /or coordinating care regarding debility 2/2 prolonged medical course.

## 2021-04-06 ENCOUNTER — APPOINTMENT (OUTPATIENT)
Dept: OCCUPATIONAL THERAPY | Facility: CLINIC | Age: 61
End: 2021-04-06
Payer: COMMERCIAL

## 2021-04-06 ENCOUNTER — APPOINTMENT (OUTPATIENT)
Dept: SPEECH THERAPY | Facility: CLINIC | Age: 61
End: 2021-04-06
Payer: COMMERCIAL

## 2021-04-06 ENCOUNTER — APPOINTMENT (OUTPATIENT)
Dept: PHYSICAL THERAPY | Facility: CLINIC | Age: 61
End: 2021-04-06
Payer: COMMERCIAL

## 2021-04-06 LAB
GLUCOSE BLDC GLUCOMTR-MCNC: 108 MG/DL (ref 70–99)
GLUCOSE BLDC GLUCOMTR-MCNC: 114 MG/DL (ref 70–99)
GLUCOSE BLDC GLUCOMTR-MCNC: 126 MG/DL (ref 70–99)
GLUCOSE BLDC GLUCOMTR-MCNC: 92 MG/DL (ref 70–99)

## 2021-04-06 PROCEDURE — 97535 SELF CARE MNGMENT TRAINING: CPT | Mod: GO | Performed by: OCCUPATIONAL THERAPIST

## 2021-04-06 PROCEDURE — 999N000125 HC STATISTIC PATIENT MED CONFERENCE < 30 MIN: Performed by: OCCUPATIONAL THERAPIST

## 2021-04-06 PROCEDURE — 99233 SBSQ HOSP IP/OBS HIGH 50: CPT | Performed by: PHYSICAL MEDICINE & REHABILITATION

## 2021-04-06 PROCEDURE — 97530 THERAPEUTIC ACTIVITIES: CPT | Mod: GO | Performed by: OCCUPATIONAL THERAPIST

## 2021-04-06 PROCEDURE — 999N000150 HC STATISTIC PT MED CONFERENCE < 30 MIN

## 2021-04-06 PROCEDURE — 250N000013 HC RX MED GY IP 250 OP 250 PS 637: Performed by: PHYSICIAN ASSISTANT

## 2021-04-06 PROCEDURE — 250N000013 HC RX MED GY IP 250 OP 250 PS 637: Performed by: STUDENT IN AN ORGANIZED HEALTH CARE EDUCATION/TRAINING PROGRAM

## 2021-04-06 PROCEDURE — 97110 THERAPEUTIC EXERCISES: CPT | Mod: GP

## 2021-04-06 PROCEDURE — 999N001017 HC STATISTIC GLUCOSE BY METER IP

## 2021-04-06 PROCEDURE — 250N000011 HC RX IP 250 OP 636: Performed by: STUDENT IN AN ORGANIZED HEALTH CARE EDUCATION/TRAINING PROGRAM

## 2021-04-06 PROCEDURE — 999N000125 HC STATISTIC PATIENT MED CONFERENCE < 30 MIN

## 2021-04-06 PROCEDURE — 97110 THERAPEUTIC EXERCISES: CPT | Mod: GO | Performed by: OCCUPATIONAL THERAPIST

## 2021-04-06 PROCEDURE — 128N000003 HC R&B REHAB

## 2021-04-06 PROCEDURE — 250N000009 HC RX 250: Performed by: STUDENT IN AN ORGANIZED HEALTH CARE EDUCATION/TRAINING PROGRAM

## 2021-04-06 RX ADMIN — SCOPALAMINE 1 PATCH: 1 PATCH, EXTENDED RELEASE TRANSDERMAL at 10:17

## 2021-04-06 RX ADMIN — Medication 12.5 MG: at 08:00

## 2021-04-06 RX ADMIN — HEPARIN, PORCINE (PF) 10 UNIT/ML INTRAVENOUS SYRINGE 5 ML: at 10:16

## 2021-04-06 RX ADMIN — Medication 12.5 MG: at 23:00

## 2021-04-06 RX ADMIN — BENZONATATE 100 MG: 100 CAPSULE ORAL at 00:57

## 2021-04-06 RX ADMIN — FAMOTIDINE 20 MG: 20 TABLET ORAL at 08:00

## 2021-04-06 RX ADMIN — OMEPRAZOLE 40 MG: 20 CAPSULE, DELAYED RELEASE ORAL at 05:50

## 2021-04-06 RX ADMIN — GUAIFENESIN AND DEXTROMETHORPHAN 5 ML: 100; 10 SYRUP ORAL at 10:54

## 2021-04-06 RX ADMIN — Medication 1 CAPSULE: at 08:00

## 2021-04-06 RX ADMIN — CALCIUM POLYCARBOPHIL 1250 MG: 625 TABLET, FILM COATED ORAL at 07:59

## 2021-04-06 RX ADMIN — POTASSIUM CHLORIDE 20 MEQ: 750 TABLET, EXTENDED RELEASE ORAL at 08:00

## 2021-04-06 RX ADMIN — PROCHLORPERAZINE MALEATE 5 MG: 5 TABLET ORAL at 20:05

## 2021-04-06 RX ADMIN — SODIUM BICARBONATE 650 MG TABLET 650 MG: at 16:56

## 2021-04-06 RX ADMIN — Medication 1 TABLET: at 08:00

## 2021-04-06 RX ADMIN — BENZONATATE 100 MG: 100 CAPSULE ORAL at 16:52

## 2021-04-06 RX ADMIN — FERROUS SULFATE TAB 325 MG (65 MG ELEMENTAL FE) 325 MG: 325 (65 FE) TAB at 07:59

## 2021-04-06 RX ADMIN — MAGNESIUM OXIDE TAB 400 MG (241.3 MG ELEMENTAL MG) 400 MG: 400 (241.3 MG) TAB at 16:56

## 2021-04-06 RX ADMIN — Medication 1 CAPSULE: at 07:59

## 2021-04-06 RX ADMIN — LIDOCAINE 1 PATCH: 560 PATCH PERCUTANEOUS; TOPICAL; TRANSDERMAL at 22:59

## 2021-04-06 RX ADMIN — MAGNESIUM OXIDE TAB 400 MG (241.3 MG ELEMENTAL MG) 400 MG: 400 (241.3 MG) TAB at 10:16

## 2021-04-06 RX ADMIN — SODIUM BICARBONATE 650 MG TABLET 650 MG: at 10:16

## 2021-04-06 RX ADMIN — TICAGRELOR 90 MG: 90 TABLET ORAL at 07:59

## 2021-04-06 RX ADMIN — Medication 1 CAPSULE: at 23:00

## 2021-04-06 RX ADMIN — TICAGRELOR 90 MG: 90 TABLET ORAL at 23:00

## 2021-04-06 RX ADMIN — ASPIRIN 81 MG CHEWABLE TABLET 81 MG: 81 TABLET CHEWABLE at 08:00

## 2021-04-06 RX ADMIN — OMEPRAZOLE 40 MG: 20 CAPSULE, DELAYED RELEASE ORAL at 16:52

## 2021-04-06 NOTE — PROGRESS NOTES
Pt A&O. Uses call light to make needs known. CGA with walker. CVC dressing c/d/i. Tessalon pearls more effective then cough syrup per pt report. Dy, infrequent cough. Provided IS device and instructed in use; pt demonstrated approp use and encouraged to use often throughout the day. Denies SOB, chest pain, n/v, diarrhea. Call light within reach. Continue with current POC.

## 2021-04-06 NOTE — PLAN OF CARE
Acute Rehab Care Conference/Team Rounds    Type: Team Rounds    Present: Dr. Chip Napier, Ivett Constantino PA, Temitope Gusman PT, Ivett Pandya OT, Sami Saul SLP, Gypsy Kahn SW, Richa Benítez RD, Rhonda Lemons , Vanessa Ayala RN, patient Breana Boudreaux    Discharge Barriers/Treatment/Education    Rehab Diagnosis: debility 2/2 prolonged medical course    Active Medical Co-morbidities/Prognosis:     Patient Active Problem List   Diagnosis Code     Debility R53.81     Lactic acidemia E87.2   history of DM type II, CKD 3      Safety: Patient is able to make her needs known with an appropriate use of call light.    Pain: Denied pain this shift. She often c/o of  Low back pain treated with Lidocaine patches and  Tylenol PRN.    Medications, Skin, Tubes/Lines: Takes her pills whole with water. Central line on R upper chest. May be removed before discharge. Skin intact  With a blanchable redness on coccyx.    Swallowing/Nutrition: Currently on regular texture diet with thin liquids. Limited appetite at times due to nausea/reflux. Esophagram was completed which confirmed reflux and also showing there are no strictures or filling defects. Disorganized esophageal motility with tertiary peristalsis. Delayed emptying of the esophagus while lying flat. Patient education provided re: reflux management. No ongoing SLP needs identified at this time and discharging from SLP.    Bowel/Bladder: Continent with both using toilet. Incontinent episode may be possible.    Psychosocial: Lives with  and 2 adult sons (Tchyneng & Xeng) in house. Independent prior to surgery/hospitalization in January. Hmong-speaking ( needed), from [facility]. 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 disabled; unable to work most of 2020 but has been on short-term disability.    ADLs/IADLs: Pt making slow but steady progress with ADLs with FWW with pt limited by back pain, coughing, and nausea. Pt requires  set-up with UBD and SBA/min A with LBD tasks with FWW. Pt requires SBA with toileting with FWW. Pt requires set-up with G/H tasks seated and SBA while standing with FWW. Pt reporting family can assist with ADLs and functional mobility. HC OT services upon discharge.     Mobility: Patient is moving well with SBA, limited by reports of back pain despite no changes in gait or safety. Pt should be ready to discharge to home with SBA and assist with bed mobility. Pt will continue with lower back pain (baseline). Pt's behaviors, learned helpless, and pain beliefs are biggest barriers to independence.     Bed Mobility: MOD A at BLE in/out of bed  Transfer: stand pivot with WW SBA  Gait: 50-60 ft with WW SBA  Stairs: Not yet tested  Balance: Needs WW support during ambulation     Cognition/Language: No cognitive linguistic related concerns.     Community Re-Entry: at a level of mod I is goal     Transportation: Not a  - family to provide.    Decision maker: self    Plan of Care and goals reviewed and updated.    Discharge Plan/Recommendations    Fall Precautions: continue    Patient/Family input to goals: yes     Estimated length of stay: 10 days     Overall plan for the patient: reach a level of mod I       Utilization Review and Continued Stay Justification    Medical Necessity Criteria:    For any criteria that is not met, please document reason and plan for discharge, transfer, or modification of plan of care to address.    Requires intensive rehabilitation program to treat functional deficits?: Yes    Requires 3x per week or greater involvement of rehabilitation physician to oversee rehabilitation program?: Yes    Requires rehabilitation nursing interventions?: Yes    Patient is making functional progress?: Yes    There is a potential for additional functional progress? Yes    Patient is participating in therapy 3 hours per day a minimum of 5 days per week or 15 hours per week in 7 day period?: Exception day 4  due to procedure off unit, and day 5 with nausea, otherwise tolerating at 15 hours per week    Has discharge needs that require coordinated discharge planning approach?:Yes      Barriers/Concerns related to meeting medical necessity criteria:  none    Team Plan to Address Concern:  As needed      Final Physician Sign off    Statement of Approval:  Chip Napier, DO      Patient Goals  Social Work Goals: Confirm discharge recommendations with therapy, coordinate safe discharge plan and remain available to support and assist as needed.     OT Frequency: daily for 60-90 minutes  OT goal: hygiene/grooming: modified independent, while standing  OT goal: upper body dressing: Modified independent, including set-up/clothing retrieval  OT goal: lower body dressing: Modified independent, including set-up/clothing retrieval, using adaptive equipment  OT goal: upper body bathing: Supervision/stand-by assist, using adaptive equipment  OT goal: lower body bathing: Supervision/stand-by assist, using adaptive equipment  OT goal: toilet transfer/toileting: Modified independent, toilet transfer, cleaning and garment management, using adaptive equipment  OT goal: meal preparation: Modified independent, with simple meal preparation, ambulatory level, using adaptive equipment  OT goal: home management: Modified independent, with light demand household tasks, ambulatory level, using adaptive equipment  OT goal 1: Pt will demo IND with BUE HEP to increase UB strengthening needed for ADLs/IADLs and functional mobility.  OT goal 2: Pt will safely complete tub/shower transfer with SBA utilizing appropriate AE/DME.     PT Frequency: Daily 60-90 minutes  PT goal: bed mobility: Supine to/from sit, Rolling, Independent  PT goal: transfers: Modified independent, Sit to/from stand, Bed to/from chair  PT goal: gait: Modified independent, 100 feet, Rolling walker  PT goal: stairs: 1 stair, Modified independent(WW platform step)   PT goal 1:  Car transfer SBA with WW    SLP goal 1: Patient will independently demonstrate strategies to aid in esophageal clearance (e.g., upright posture, slow rate of intake, alternating bites/sips, etc.) during a meal or snack. Goal met.     RN Goal: Bowel: Pt will call when needing to use the bathroom to improve bowel continence.  RN Goal: Skin Integrity: Pt will reposition, and call for help repositioning as necessary to prevent pressure injuries.  RN Goal: Safety Management: Pt will call for all needs while at ARU until independent.

## 2021-04-06 NOTE — PLAN OF CARE
Post Rounds Family Phone Call    Attempted to call sons Tcmitesh and Chris, no answer, left messages.

## 2021-04-06 NOTE — PROGRESS NOTES
Team rounds this morning.  used. Plan to discharge home Thursday 04/08 with HC services and family A. Team discussed with pt, pt agreeable. Therapy working on coordinating family training. SWer discussed HC, SOC, services, and insurance coverage. SW offered choice, pt denied preference. SW verified that [facility] HC in-network with insurance. Referral for RN, PT, OT and HA sent to [facility], [facility] able to accept and contact information added to AVS. [facility] HC aware that pt prefers her son's be contacted to coordinate SOC and that they speak english. Pt denied additional needs or concerns at this time. SW will remain available as needs arise.     Home Health Care:   [facility] PH: [phone] (previously known as: [facility])  Nurse, physical therapy, occupational therapy, home health aide     Gypsy Kahn, LICSW, [facility] Acute Rehab Unit   Phone: [phone]  I   Pager: [phone]

## 2021-04-06 NOTE — CONSULTS
Patient is a 61 year old female with history of history of SHYANNE, lactic acidosis, rhabdomyolysis, GI bleed, and UTI now under the care of acute rehabilitation unit. She has an existing right internal jugular tunneled central venous catheter placed at outside hospital. Patient's team requesting tunneled catheter removal prior to discharge expected on 4/8/21. Patient will be added to IR schedule on 4/7/21 for tunneled central venous catheter removal.     Labs WNL for procedure.      Preprocedural orders have been entered. No NPO required.  Consent will be done prior to procedure.     Please contact the IR control at [phone] for estimated time of procedure.     Case discussed with primary team.    Theodore Amanda PA-C  Interventional Radiology  [phone] pgr.

## 2021-04-06 NOTE — PLAN OF CARE
FOCUS/GOAL  Bowel management, Bladder management, Pain management, Mobility, Skin integrity, and Safety management    ASSESSMENT, INTERVENTIONS AND CONTINUING PLAN FOR GOAL:  A/O, VS stable. Regular/thin, pills whole. Ate some of meal. Continent and incontinent of bowel and bladder, last BM today. No complaints of pain for this shift. No current skin concerns. Scopolamine patch replaced and switched to left ear. CGA with walker, occasional reminders to use the walker. Calls occasionally with light. Rounding to try to meet needs. Rounds today. Planning to discharge Thursday. CVC being removed tomorrow. Continue POC.

## 2021-04-06 NOTE — PLAN OF CARE
Discharge Planner Post-Acute Rehab PT:      Discharge Plan: Home with family assist prn, HCPT, ~4/9/21     Precautions: Falls, lower back pain, nausea  Needs Hmong  (iPad in room)     Current Status:  Bed Mobility: MOD A at BLE in/out of bed  Transfer: stand pivot with WW SBA  Gait: 50-60 ft with WW SBA  Stairs: Not yet tested  Balance: Needs WW support during ambulation     Assessment: Patient is moving well with SBA, limited by reports of back pain despite no changes in gait or safety. Pt should be ready to discharge to home with SBA and assist with bed mobility. Pt will continue with lower back pain (baseline). Pt's behaviors, learned helpless, and pain beliefs are biggest barriers to independence.      Other Barriers to Discharge (DME, Family Training, etc): None anticipated, WW for home

## 2021-04-06 NOTE — PROGRESS NOTES
[facility]   Acute Rehabilitation Unit  Daily progress note    INTERVAL HISTORY  Patient seen and examined this AM.  No acute overnight events.  Denies chest pain, no fever or chills and no abdominal pain.  She continues to have cough, but no signs of infection.  Likely from reflux, pepcid has been added.  Will  educate on GERD eating/posture.   Patient is making great gains despite debility.  Will start planning for discharge home given medical stability.     Function:  PT:  Bed Mobility: MOD A at BLE in/out of bed  Transfer: stand pivot with WW SBA  Gait: 50-60 ft with WW SBA  Stairs: Not yet tested  Balance: Needs WW support during ambulation     ROS: 10 point ROS neg other than the symptoms noted above in the HPI.        MEDICATIONS  Scheduled meds    aspirin  81 mg Oral Daily     calcium polycarbophil  1,250 mg Oral Daily     famotidine  20 mg Oral Daily     ferrous sulfate  325 mg Oral Daily with breakfast     heparin lock flush  5-10 mL Intracatheter Q24H     insulin aspart  1-3 Units Subcutaneous TID AC     insulin aspart  1-3 Units Subcutaneous At Bedtime     lactobacillus rhamnosus (GG)  1 capsule Oral BID     lidocaine  1 patch Transdermal Q24h    And     lidocaine   Transdermal Q8H     magnesium oxide  400 mg Oral BID     metoprolol tartrate  12.5 mg Oral BID     multivitamin RENAL  1 tablet Oral Daily     omeprazole  40 mg Oral BID AC     potassium chloride  20 mEq Oral Daily     psyllium  1 capsule Oral Daily     scopolamine  1 patch Transdermal Q72H    And     scopolamine   Transdermal Q8H     sodium bicarbonate  650 mg Oral BID     ticagrelor  90 mg Oral BID     PRN meds:  acetaminophen, benzonatate, glucose **OR** dextrose **OR** glucagon, guaiFENesin-dextromethorphan, heparin lock flush, ipratropium - albuterol 0.5 mg/2.5 mg/3 mL, melatonin, polyethylene glycol, prochlorperazine, senna-docusate, sodium chloride (PF)    PHYSICAL EXAM  /72 (BP Location:  "Left arm)   Pulse 111   Temp 98.3  F (36.8  C) (Oral)   Resp 20   Ht 1.499 m (4' 11\")   Wt 56 kg (123 lb 6.4 oz)   SpO2 95%   BMI 24.92 kg/m    Gen: Pleasant female, resting in bed   HEENT: NCAT, EOMI  Cardio: RRR, normal S1/S2  Pulm: CTAB, non labored   Abd: Soft, ND, NT, BS+  Ext: No LE tenderness, no edema  Neuro/MSK: Speech clear and fluent per , moving limbs antigravity,  strength symmetric.    LABS    Lab Results   Component Value Date    WBC 8.0 04/05/2021     Lab Results   Component Value Date    RBC 2.54 04/05/2021     Lab Results   Component Value Date    HGB 7.9 04/05/2021     Lab Results   Component Value Date    HCT 25.1 04/05/2021     Lab Results   Component Value Date    MCV 99 04/05/2021     Lab Results   Component Value Date    MCH 31.1 04/05/2021     Lab Results   Component Value Date    MCHC 31.5 04/05/2021     Lab Results   Component Value Date    RDW 18.2 04/05/2021     Lab Results   Component Value Date     04/05/2021     Last Comprehensive Metabolic Panel:  Sodium   Date Value Ref Range Status   04/05/2021 138 133 - 144 mmol/L Final     Potassium   Date Value Ref Range Status   04/05/2021 3.1 (L) 3.4 - 5.3 mmol/L Final     Chloride   Date Value Ref Range Status   04/05/2021 107 94 - 109 mmol/L Final     Carbon Dioxide   Date Value Ref Range Status   04/05/2021 25 20 - 32 mmol/L Final     Anion Gap   Date Value Ref Range Status   04/05/2021 6 3 - 14 mmol/L Final     Glucose   Date Value Ref Range Status   04/05/2021 90 70 - 99 mg/dL Final     Urea Nitrogen   Date Value Ref Range Status   04/05/2021 15 7 - 30 mg/dL Final     Creatinine   Date Value Ref Range Status   04/05/2021 2.58 (H) 0.52 - 1.04 mg/dL Final     GFR Estimate   Date Value Ref Range Status   04/05/2021 19 (L) >60 mL/min/[1.73_m2] Final     Comment:     Non  GFR Calc  Starting 12/18/2018, serum creatinine based estimated GFR (eGFR) will be   calculated using the Chronic Kidney Disease " Epidemiology Collaboration   (CKD-EPI) equation.       Calcium   Date Value Ref Range Status   04/05/2021 8.1 (L) 8.5 - 10.1 mg/dL Final         ASSESSMENT AND PLAN  Breana Boudreaux is a 61 year old woman with CAD s/p recent stenting 2/3/21, DM type 2, HTN, RLS, who presented 2/21/21 with shortness of breath found to be in SHYANNE with lactic acidosis, rhabdomyolysis, with other electrolyte abnormalities was started on dialysis, hospital stay complicated by GI bleed, UTI, thrombocytopenia,  discharged to LTAC 3/12 last dialysis 3/13. Admitted to acute rehab 3/26/21. Briefly discharged back to acute hospital 3/28-3/30, then readmitted to ARU on 3/30/21.     Admission to acute inpatient rehab rhabdomyolysis  Impairment group code: 03.8     1. PT, OT 90 minutes of each on a daily basis, in addition to rehab nursing and close management of physiatrist.       2. Impairment of ADL's: Noted to have impaired strength, impaired activity tolerance, leading to decreased ability to independently complete ADL's.  Will benefit from ongoing OT with goal for MOD I with basic ADLs.      3. Impairment of mobility:  Noted to have impaired strength, impaired activity tolerance, and impaired balance leading to decreased mobility.  Will benefit from ongoing PT with goal for CHERYL with basic mobility.      4. Medical Conditions  Acute Kidney Injury  Lactic acidosis-   Rhabdomyolysis  Presented 2/23/21 with SHYANNE, lactic acidosis, rhabdo CK 80,000 requiring HD felt to be secondary to statin. Last HD 3/13  -trend weights, intake/output  -trend BMP  -continue to hold statin  -continue to hold metformin  -started on sodium bicarb 650 bid 3/26 per discharging facility     Urosepsis  Possible Healthcare Acquired Pneumonia  - Cefpodoxime 200 mg qDay x4 days     CAD- s/p PCI 2/3/21 on brilinta and asa.  -continue dual antiplatelet therapy  -metoprolol 12.5 mg bid dose reduced 3/26     HTN- with hypotension during LTAC stay with metoprolol dose  reduced 3/26.   -monitor BP  -continue metoprolol 12.5 mg bid     Nausea/vomiting- reports ongoing since hospitalization 2/21 told was in setting of renal failure though has not improved per her report. Last QTc 3/16 486.    -ppi as above  -4/1: Scopolamine patch ordered  -encourage intake  -prn compazine     QTc- mildly prolonged 486 3/16  -routine EKG eval Qtc-      Hypomagnesemia, Hypokalemia  - continue mag oxide  - 4/1: Replete 40 mEq K; 4/2 order replacement protocol  -Mag WNL today, however, K still low, will contiue supplement.      Recent GI bleed- with edg and flex sig 2/22/21 with resolution.   -monitor  -continue ppi bid, Pepcid started      Anemia- in setting of critical illness, renal failure.  Hgb 7.3 3/30  -continue iron supplement daily  -trend hgb     Leucocytosis-  WBC 8.5 3/30  -trend  -monitor for signs/symptoms of infection     DM type 2- Hgb A1c 2/21/21 9.2% previously on metformin, lantus, metformin held in setting of recent lactic acidosis. bg overall stable on ssi, though intake reported as poor due to nausea/ vomiting  -check bg qid  -ssi- will taper off or add long acting pending bg levels during rehab stay.       5. Adjustment to disability:  Clinical psychology to eval and treat as indicated  6. FEN: regular- cardiac- though appetite poor and n/v  7. Bowel: Schedule pysllium in attempt to firm up stools. If patient remains incontinent, will continue starting bowel program. Will also order probiotic as patient has been on antibiotics  8. Bladder: monitor as recovering renal failure  9. DVT Prophylaxis: mechanical  10. GI Prophylaxis: ppi bid  11. Code: full  12. Disposition: home  13. ELOS: 4/9/21  14. Rehab prognosis:  good  15. Follow up Appointments on Discharge: pcp, cardiology        Chip Napier, DO  Physical Medicine & Rehabilitation        I spent a total of 35 minutes face to face and coordinating care of Breana Boudreaux. Over 50% of my time on the unit was spent counseling  the patient and /or coordinating care regarding debility 2/2 prolonged medical course.

## 2021-04-07 ENCOUNTER — APPOINTMENT (OUTPATIENT)
Dept: INTERVENTIONAL RADIOLOGY/VASCULAR | Facility: CLINIC | Age: 61
DRG: 558 | End: 2021-04-07
Attending: PHYSICIAN ASSISTANT
Payer: COMMERCIAL

## 2021-04-07 LAB
ALBUMIN SERPL-MCNC: 2.5 G/DL (ref 3.4–5)
ALP SERPL-CCNC: 114 U/L (ref 40–150)
ALT SERPL W P-5'-P-CCNC: 33 U/L (ref 0–50)
ANION GAP SERPL CALCULATED.3IONS-SCNC: 9 MMOL/L (ref 3–14)
AST SERPL W P-5'-P-CCNC: 78 U/L (ref 0–45)
BILIRUB DIRECT SERPL-MCNC: 0.2 MG/DL (ref 0–0.2)
BILIRUB SERPL-MCNC: 0.4 MG/DL (ref 0.2–1.3)
BUN SERPL-MCNC: 15 MG/DL (ref 7–30)
CALCIUM SERPL-MCNC: 8.2 MG/DL (ref 8.5–10.1)
CHLORIDE SERPL-SCNC: 110 MMOL/L (ref 94–109)
CO2 SERPL-SCNC: 24 MMOL/L (ref 20–32)
CREAT SERPL-MCNC: 2.54 MG/DL (ref 0.52–1.04)
ERYTHROCYTE [DISTWIDTH] IN BLOOD BY AUTOMATED COUNT: 18.3 % (ref 10–15)
GFR SERPL CREATININE-BSD FRML MDRD: 20 ML/MIN/{1.73_M2}
GLUCOSE BLDC GLUCOMTR-MCNC: 117 MG/DL (ref 70–99)
GLUCOSE BLDC GLUCOMTR-MCNC: 128 MG/DL (ref 70–99)
GLUCOSE BLDC GLUCOMTR-MCNC: 95 MG/DL (ref 70–99)
GLUCOSE SERPL-MCNC: 97 MG/DL (ref 70–99)
HCT VFR BLD AUTO: 26.7 % (ref 35–47)
HGB BLD-MCNC: 8.4 G/DL (ref 11.7–15.7)
LABORATORY COMMENT REPORT: NORMAL
LACTATE BLD-SCNC: 1.8 MMOL/L (ref 0.7–2)
MAGNESIUM SERPL-MCNC: 1.9 MG/DL (ref 1.6–2.3)
MCH RBC QN AUTO: 30.8 PG (ref 26.5–33)
MCHC RBC AUTO-ENTMCNC: 31.5 G/DL (ref 31.5–36.5)
MCV RBC AUTO: 98 FL (ref 78–100)
PLATELET # BLD AUTO: 317 10E9/L (ref 150–450)
POTASSIUM SERPL-SCNC: 3.3 MMOL/L (ref 3.4–5.3)
PROT SERPL-MCNC: 7.4 G/DL (ref 6.8–8.8)
RBC # BLD AUTO: 2.73 10E12/L (ref 3.8–5.2)
SARS-COV-2 RNA RESP QL NAA+PROBE: NEGATIVE
SODIUM SERPL-SCNC: 143 MMOL/L (ref 133–144)
SPECIMEN SOURCE: NORMAL
WBC # BLD AUTO: 8.6 10E9/L (ref 4–11)

## 2021-04-07 PROCEDURE — 87635 SARS-COV-2 COVID-19 AMP PRB: CPT | Performed by: PHYSICIAN ASSISTANT

## 2021-04-07 PROCEDURE — 250N000013 HC RX MED GY IP 250 OP 250 PS 637: Performed by: STUDENT IN AN ORGANIZED HEALTH CARE EDUCATION/TRAINING PROGRAM

## 2021-04-07 PROCEDURE — 93010 ELECTROCARDIOGRAM REPORT: CPT | Performed by: INTERNAL MEDICINE

## 2021-04-07 PROCEDURE — 83605 ASSAY OF LACTIC ACID: CPT | Performed by: PHYSICAL MEDICINE & REHABILITATION

## 2021-04-07 PROCEDURE — 250N000013 HC RX MED GY IP 250 OP 250 PS 637: Performed by: PHYSICIAN ASSISTANT

## 2021-04-07 PROCEDURE — 85027 COMPLETE CBC AUTOMATED: CPT | Performed by: PHYSICIAN ASSISTANT

## 2021-04-07 PROCEDURE — 250N000011 HC RX IP 250 OP 636: Performed by: STUDENT IN AN ORGANIZED HEALTH CARE EDUCATION/TRAINING PROGRAM

## 2021-04-07 PROCEDURE — 128N000003 HC R&B REHAB

## 2021-04-07 PROCEDURE — 93005 ELECTROCARDIOGRAM TRACING: CPT

## 2021-04-07 PROCEDURE — 250N000009 HC RX 250: Performed by: PHYSICIAN ASSISTANT

## 2021-04-07 PROCEDURE — 36592 COLLECT BLOOD FROM PICC: CPT | Performed by: PHYSICIAN ASSISTANT

## 2021-04-07 PROCEDURE — 80048 BASIC METABOLIC PNL TOTAL CA: CPT | Performed by: PHYSICIAN ASSISTANT

## 2021-04-07 PROCEDURE — 0JPTXXZ REMOVAL OF TUNNELED VASCULAR ACCESS DEVICE FROM TRUNK SUBCUTANEOUS TISSUE AND FASCIA, EXTERNAL APPROACH: ICD-10-PCS | Performed by: PHYSICIAN ASSISTANT

## 2021-04-07 PROCEDURE — 05PYX3Z REMOVAL OF INFUSION DEVICE FROM UPPER VEIN, EXTERNAL APPROACH: ICD-10-PCS | Performed by: PHYSICIAN ASSISTANT

## 2021-04-07 PROCEDURE — 97535 SELF CARE MNGMENT TRAINING: CPT | Mod: GO

## 2021-04-07 PROCEDURE — 999N001017 HC STATISTIC GLUCOSE BY METER IP

## 2021-04-07 PROCEDURE — 36589 REMOVAL TUNNELED CV CATH: CPT | Performed by: PHYSICIAN ASSISTANT

## 2021-04-07 PROCEDURE — 36415 COLL VENOUS BLD VENIPUNCTURE: CPT | Performed by: PHYSICAL MEDICINE & REHABILITATION

## 2021-04-07 PROCEDURE — 99232 SBSQ HOSP IP/OBS MODERATE 35: CPT | Performed by: PHYSICAL MEDICINE & REHABILITATION

## 2021-04-07 PROCEDURE — 83735 ASSAY OF MAGNESIUM: CPT | Performed by: PHYSICIAN ASSISTANT

## 2021-04-07 PROCEDURE — 80076 HEPATIC FUNCTION PANEL: CPT | Performed by: PHYSICIAN ASSISTANT

## 2021-04-07 PROCEDURE — 97530 THERAPEUTIC ACTIVITIES: CPT | Mod: GP

## 2021-04-07 PROCEDURE — 36415 COLL VENOUS BLD VENIPUNCTURE: CPT | Performed by: PHYSICIAN ASSISTANT

## 2021-04-07 RX ORDER — MAGNESIUM OXIDE 400 MG/1
400 TABLET ORAL 2 TIMES DAILY
Qty: 60 TABLET | Refills: 0 | Status: SHIPPED | OUTPATIENT
Start: 2021-04-07

## 2021-04-07 RX ORDER — OMEPRAZOLE 40 MG/1
40 CAPSULE, DELAYED RELEASE ORAL 2 TIMES DAILY
Qty: 60 CAPSULE | Refills: 0 | Status: SHIPPED | OUTPATIENT
Start: 2021-04-07

## 2021-04-07 RX ORDER — FERROUS SULFATE 325(65) MG
325 TABLET ORAL
Qty: 30 TABLET | Refills: 0 | Status: SHIPPED | OUTPATIENT
Start: 2021-04-07

## 2021-04-07 RX ORDER — PROCHLORPERAZINE MALEATE 5 MG
5 TABLET ORAL EVERY 8 HOURS PRN
Qty: 10 TABLET | Refills: 0 | Status: SHIPPED | OUTPATIENT
Start: 2021-04-07

## 2021-04-07 RX ORDER — VIT B COMP NO.3/FOLIC/C/BIOTIN 1 MG-60 MG
1 TABLET ORAL DAILY
Qty: 30 TABLET | Refills: 0 | Status: SHIPPED | OUTPATIENT
Start: 2021-04-07

## 2021-04-07 RX ORDER — POTASSIUM CHLORIDE 1500 MG/1
20 TABLET, EXTENDED RELEASE ORAL 2 TIMES DAILY
Qty: 60 TABLET | Refills: 0 | Status: SHIPPED | OUTPATIENT
Start: 2021-04-07

## 2021-04-07 RX ORDER — METOPROLOL TARTRATE 25 MG/1
12.5 TABLET, FILM COATED ORAL 2 TIMES DAILY
Qty: 30 TABLET | Refills: 0 | Status: SHIPPED | OUTPATIENT
Start: 2021-04-07

## 2021-04-07 RX ORDER — POTASSIUM CHLORIDE 750 MG/1
20 TABLET, EXTENDED RELEASE ORAL 2 TIMES DAILY
Status: DISCONTINUED | OUTPATIENT
Start: 2021-04-07 | End: 2021-04-08 | Stop reason: HOSPADM

## 2021-04-07 RX ORDER — SODIUM BICARBONATE 650 MG/1
650 TABLET ORAL 2 TIMES DAILY
Qty: 60 TABLET | Refills: 0 | Status: SHIPPED | OUTPATIENT
Start: 2021-04-07

## 2021-04-07 RX ORDER — FAMOTIDINE 20 MG/1
20 TABLET, FILM COATED ORAL 2 TIMES DAILY PRN
Qty: 30 TABLET | Refills: 0 | Status: SHIPPED | OUTPATIENT
Start: 2021-04-07

## 2021-04-07 RX ADMIN — TICAGRELOR 90 MG: 90 TABLET ORAL at 20:36

## 2021-04-07 RX ADMIN — BENZONATATE 100 MG: 100 CAPSULE ORAL at 14:59

## 2021-04-07 RX ADMIN — MAGNESIUM OXIDE TAB 400 MG (241.3 MG ELEMENTAL MG) 400 MG: 400 (241.3 MG) TAB at 17:16

## 2021-04-07 RX ADMIN — Medication 1 TABLET: at 09:38

## 2021-04-07 RX ADMIN — MAGNESIUM OXIDE TAB 400 MG (241.3 MG ELEMENTAL MG) 400 MG: 400 (241.3 MG) TAB at 09:43

## 2021-04-07 RX ADMIN — OMEPRAZOLE 40 MG: 20 CAPSULE, DELAYED RELEASE ORAL at 06:32

## 2021-04-07 RX ADMIN — TICAGRELOR 90 MG: 90 TABLET ORAL at 09:38

## 2021-04-07 RX ADMIN — CALCIUM POLYCARBOPHIL 1250 MG: 625 TABLET, FILM COATED ORAL at 09:38

## 2021-04-07 RX ADMIN — OMEPRAZOLE 40 MG: 20 CAPSULE, DELAYED RELEASE ORAL at 17:16

## 2021-04-07 RX ADMIN — Medication 1 CAPSULE: at 09:38

## 2021-04-07 RX ADMIN — SODIUM BICARBONATE 650 MG TABLET 650 MG: at 17:16

## 2021-04-07 RX ADMIN — ASPIRIN 81 MG CHEWABLE TABLET 81 MG: 81 TABLET CHEWABLE at 09:38

## 2021-04-07 RX ADMIN — Medication 1 CAPSULE: at 09:39

## 2021-04-07 RX ADMIN — BENZONATATE 100 MG: 100 CAPSULE ORAL at 20:49

## 2021-04-07 RX ADMIN — Medication 12.5 MG: at 09:38

## 2021-04-07 RX ADMIN — POTASSIUM CHLORIDE 20 MEQ: 750 TABLET, EXTENDED RELEASE ORAL at 09:39

## 2021-04-07 RX ADMIN — HEPARIN, PORCINE (PF) 10 UNIT/ML INTRAVENOUS SYRINGE 5 ML: at 05:30

## 2021-04-07 RX ADMIN — FERROUS SULFATE TAB 325 MG (65 MG ELEMENTAL FE) 325 MG: 325 (65 FE) TAB at 09:38

## 2021-04-07 RX ADMIN — SODIUM BICARBONATE 650 MG TABLET 650 MG: at 09:44

## 2021-04-07 RX ADMIN — LIDOCAINE HYDROCHLORIDE 2.5 ML: 10 INJECTION, SOLUTION EPIDURAL; INFILTRATION; INTRACAUDAL; PERINEURAL at 10:31

## 2021-04-07 RX ADMIN — POTASSIUM CHLORIDE 20 MEQ: 750 TABLET, EXTENDED RELEASE ORAL at 20:36

## 2021-04-07 RX ADMIN — Medication 12.5 MG: at 20:36

## 2021-04-07 RX ADMIN — FAMOTIDINE 20 MG: 20 TABLET ORAL at 09:39

## 2021-04-07 NOTE — PLAN OF CARE
FOCUS/GOAL  Medical management    ASSESSMENT, INTERVENTIONS AND CONTINUING PLAN FOR GOAL:  Sleep troughout the night without c/o although some infrequent dry cough. Ambulated to the bathroom twice with SBA using walker and gait belt. Patient voided and had loose medium BM. Denied pain. Will continue with POC.

## 2021-04-07 NOTE — PLAN OF CARE
Post Rounds Family Phone Call  Length of call: 10 minutes  Family involved: Son Amos  Projected discharge date: Thursday 4/7  Projected discharge location: Home with HCPT and family assist  Equipment needs: WW  Other questions and misc:     Gave son update to pt's level of mobility, walker use, and level of assist needed. Explained that pt's IND fluctuates with her mood, and at times she asks for help when not necessarily needed. Son endorses this is not new, but is concerned that she may be struggling with depression.     Planning for family training with two sons scheduled for 9AM tomorrow. Son believes pt will perform better with family present.

## 2021-04-07 NOTE — PLAN OF CARE
Discharge Planner Post-Acute Rehab PT:      Discharge Plan: Home with family assist prn on 4/8, HCPT     Precautions: Falls, baseline hip and lower back pain, nausea from GERD  Needs Anabel  (iPad in room)     Current Status:  Bed Mobility: MOD A to bring BLE into bed   Transfer: stand pivot with WW SBA  Gait: up to 100 ft with WW SBA  Stairs: SBA using B rails, cues for up with good and down with bad  Balance: Needs WW support during ambulation     Assessment: IND Day completed today. Pt completes ambualtion and transfers using WW with SBA. Needs min A for car transfer d/t hip and lower back pain. Plan for F.T. tomorrow with sons at 9AM. Anticipate family to assist with bed mobility and car transfer, as well as provide supervision on stairs.      Other Barriers to Discharge (DME, Family Training, etc):  Youth walker issued on 4/7. F.T. tomorrow prior to discharge.

## 2021-04-07 NOTE — PROCEDURES
[facility]    Procedure: IR Procedure Note    Date/Time: 4/7/2021 10:41 AM  Performed by: Esteban Amnada PA-C  Authorized by: Esteban Amanda PA-C     UNIVERSAL PROTOCOL   Site Marked: NA  Prior Images Obtained and Reviewed:  Yes  Required items: Required blood products, implants, devices and special equipment available    Patient identity confirmed:  Verbally with patient, arm band, provided demographic data and hospital-assigned identification number  Patient was reevaluated immediately before administering moderate or deep sedation or anesthesia  Confirmation Checklist:  Patient's identity using two indicators, relevant allergies, procedure was appropriate and matched the consent or emergent situation and correct equipment/implants were available  Time out: Immediately prior to the procedure a time out was called    Universal Protocol: the Joint Commission Universal Protocol was followed    Preparation: Patient was prepped and draped in usual sterile fashion    ESBL (mL):  1         ANESTHESIA    Anesthesia: Local infiltration  Local Anesthetic:  Lidocaine 1% without epinephrine  Anesthetic Total (mL):  3      SEDATION    Patient Sedated: No    See dictated procedure note for full details.  Findings: Existing right internal jugular, 6 Fr., dual lumen tunneled central venous catheter removed intact and without difficulty.    Specimens: none    Complications: None    Condition: Stable    Plan: Upright for 2 hours, no strenuous activity for 3 days.    PROCEDURE   Patient Tolerance:  Patient tolerated the procedure well with no immediate complications    Length of time physician/provider present for 1:1 monitoring during sedation: 0

## 2021-04-07 NOTE — PLAN OF CARE
FOCUS/GOAL  Bladder management, Nutrition/Feeding/Swallowing precautions, Medication management, Medical management, and Mobility    ASSESSMENT, INTERVENTIONS AND CONTINUING PLAN FOR GOAL:  Pt is alert and oriented x4. Denies pain, SOB or nausea this shift. Ordered meals for pt, still with poor appetite. Pt cont of bladder using toilet. Went down to IR for procedure to remove internal jugular line on right chest. Procedure went well. Potassium increased per MD, no RN protocol initiated per discussion with MD. Plan to discharge tomorrow.

## 2021-04-07 NOTE — PLAN OF CARE
Discharge Planner Post-Acute Rehab OT:      Discharge Plan: Home with HC vs OP OT services. Est. LOS: 10 days     Precautions: falls, hx of chronic low back pain     Current Status:  ADLs: Pt requires set-up with donning shirt seated and min A with LBD tasks with FWW. Pt requires SBACGA with STS with FWW pending surface height and SBA with functional mobility with FWW in BR. Pt requires SBA with toilet transfer with FWW. Pt requires SBA with G/H tasks standing at EOS with FWW. Pt limited by back pain with standing tolerance.   IADLs: Will further assess. Pt has supportive family to assist upon discharge.   Vision/Cognition: Pt wears reading glasses. Will further assess cognition.      Assessment: Completed IND shower, SBA UB drg/wash/dry and dep for LB drg/wash/dry d/t lower back pain and fatigue. SBA toiletnig w/FWW and CGA + FWW ambulation to shower room, limited by fatigue and pain, requested WC back to room. Pt reports family is able to assist PRN at home.      Other Barriers to Discharge (DME, Family Training, etc): Family training prior to discharge. Pt has grab bars in bathroom and a shower chair.

## 2021-04-07 NOTE — DISCHARGE INSTRUCTIONS
Follow up Appointments    - Primary Care  You are scheduled to see Dr. Ewdardo Garcia on Tuesday, April 13th at 1:00 PM.    Address  [facility]                          [address] [place] [address]  Phone   [phone]  Fax                  [phone]    - Cardiology  You are scheduled for a telephone appointment with Dr. Ryan on Thursday, April 29th at 4:30 PM. They will call your son to initiate the appointment: [phone].    Address  [facility]                           [address]                            [address]                            [place] [address]  Phone   [phone]      --------------------------------      Home Health Care:   [facility] PH: [phone] (previously known as: [facility])  Nurse, physical therapy, occupational therapy, home health aide  -Your sons will get a call after you have discharged from [facility] to schedule the first home care visit. The home health nurse should come out within the first 24-48 hours. If you don't get a call from them within the first 48 hours, please call to follow up (number above).

## 2021-04-07 NOTE — DISCHARGE SUMMARY
[facility]   Acute Rehabilitation Unit  Discharge summary     Date of Admission: 3/30/2021  Date of Discharge: 4/8/21  Disposition: home  Primary Care Physician: Edwardo Garcia  Attending physician: Chip Napier DO      DISCHARGE DIAGNOSIS  Debility  shyanne on ckd (improved)  GERD  CAD  Prolonged QTc  anemia     BRIEF SUMMARY  Breana Boudreaux is a 61 year old woman with CAD s/p recent stenting 2/3/21, DM type 2, HTN, RLS, who presented 2/21/21 with shortness of breath found to be in SHYANNE with lactic acidosis, rhabdomyolysis, with other electrolyte abnormalities was started on dialysis, hospital stay complicated by GI bleed, UTI, thrombocytopenia,  discharged to LTAC 3/12 last dialysis 3/13. Admitted to acute rehab 3/26/21. Briefly discharged back to acute hospital 3/28-3/30 with sepsis in setting of UTI and possible PNA, then readmitted to ARU on 3/30/21.    REHABILITATION COURSE  PT: Current Status:  Bed Mobility: MOD A to bring BLE into bed   Transfer: stand pivot with WW SBA  Gait: up to 100 ft with WW SBA  Stairs: SBA using B rails, cues for up with good and down with bad  Balance: Needs WW support during ambulation     Assessment:Pt completes ambualtion and transfers using WW with SBA. Needs min A for car transfer d/t hip and lower back pain. Completed family training prior to discharge.  Anticipate family to assist with bed mobility and car transfer, as well as provide supervision on stairs.     OT: ADLs: Pt requires set-up with donning shirt seated and min A with LBD tasks with FWW. Pt requires SBACGA with STS with FWW pending surface height and SBA with functional mobility with FWW in BR. Pt requires SBA with toilet transfer with FWW. Pt requires SBA with G/H tasks standing at EOS with FWW. Pt limited by back pain with standing tolerance.   IADLs: Will further assess. Pt has supportive family to assist upon discharge.   Vision/Cognition: Pt wears reading  "glasses.    Home with home care PT/OT.      MEDICAL COURSE    SHYANNE on CKD- initially Presented 2/23/21 with SHYANNE, lactic acidosis, rhabdo CK 80,000 requiring HD felt to be secondary to statin. Last HD 3/13.  Cr. Continues to slowly trend down. Cr. 2.54 4/7/21. Prior to 2/21 baseline appears to be 1.3-1.5   - continue sodium bicarbonate tablets  -trend BMP    GERD  Presbyesophagus - patient with complaints of nausea, poor appetite, cough.  Concern for dysphagia s/p esophagram 4/2 with \" Presbyesophagus with significant gastroesophageal reflux\"   Prilosec 40 mg bid  -add Pepcid for short course  -GERD education given to patient and family  -follow up PCP     Severe sepsis due to acute cystitis without hematuria  Lactic Acidosis, acute on chronic  Of note, patient's UTI was notable for bacteriuria and grew E.coli on day 2. Received zosyn and vantin with clinical improvement.     Concern for healthcare-acquired pneumonia  Patient presenting from ARU with acute progressive cough productive of yellow sputum. Lactate of ~5.0 on admission (though is chronically elevated PTA with unclear baseline). Also with new/worsening tachycardia to 110s, tachypnea to upper 20s, new leukocytosis, CXR with increased interstitial and airspace opacities concerning for infection vs. Edema. Oxygen requirements appear to be at baseline.    -treated with zosyn vantin as above     Acute normocytic anemia due to GI bleed - Patient with normal hemoglobin in January 2021, had a recent GI bleed s/p EGD and flex sig. Though the source of the bleed was treated, she is recovering her hemoglobin count slowly. No active bleed noted during her hospitalization. Hgb 8.4 4/7/21  -ppi increased in setting of significant gerd  -continue iron supplement.   -f/u pcp     Hypokalemia - K 3.3 4/7  -continue potassium 20 mEQ bid  -follow up level with pcp     DMII- BGs trending 100s-170s, A1c 2/21 of 9.2%. Previously on metformin,  held in setting of lactic acidosis " during hospitalization not restarted due to bg stability &  Intake poor bg 90s-110s.  -follow up pcp consider resume metformin     CAD  HTN  S/p PCI 02/03/2021.  Statin held in setting of rhabdo 2/2 statin.   - continue ticagrelor, ASA  , -continue metoprolol 12.5 mg bid  -f/u cardiology as scheduled.      Hx of Prolonged QTc -with QTc of 673 3/28. Repeat  on 4/7  Avoiding QT-prolonging meds as able.    DISCHARGE MEDICATIONS  Current Discharge Medication List      START taking these medications    Details   famotidine (PEPCID) 20 MG tablet Take 1 tablet (20 mg) by mouth 2 times daily as needed (upset stomach, reflux symptoms.)  Qty: 30 tablet, Refills: 0    Associated Diagnoses: Hypomagnesemia      potassium chloride ER (KLOR-CON M) 20 MEQ CR tablet Take 1 tablet (20 mEq) by mouth 2 times daily  Qty: 60 tablet, Refills: 0    Associated Diagnoses: Hypokalemia         CONTINUE these medications which have CHANGED    Details   ferrous sulfate (FEROSUL) 325 (65 Fe) MG tablet Take 1 tablet (325 mg) by mouth daily (with breakfast)  Qty: 30 tablet, Refills: 0    Associated Diagnoses: Anemia, unspecified type      magnesium oxide (MAG-OX) 400 MG tablet Take 1 tablet (400 mg) by mouth 2 times daily  Qty: 60 tablet, Refills: 0    Associated Diagnoses: Hypomagnesemia      metoprolol tartrate (LOPRESSOR) 25 MG tablet Take 0.5 tablets (12.5 mg) by mouth 2 times daily  Qty: 30 tablet, Refills: 0    Associated Diagnoses: Coronary artery disease involving native heart with angina pectoris, unspecified vessel or lesion type (H)      multivitamin RENAL (RENAVITE RX/NEPHROVITE) 1 MG tablet Take 1 tablet by mouth daily  Qty: 30 tablet, Refills: 0    Associated Diagnoses: Acute renal failure, unspecified acute renal failure type (H)      omeprazole (PRILOSEC) 40 MG DR capsule Take 1 capsule (40 mg) by mouth 2 times daily  Qty: 60 capsule, Refills: 0    Associated Diagnoses: Gastroesophageal reflux disease without esophagitis       prochlorperazine (COMPAZINE) 5 MG tablet Take 1 tablet (5 mg) by mouth every 8 hours as needed for nausea or vomiting  Qty: 10 tablet, Refills: 0    Associated Diagnoses: Debility      sodium bicarbonate 650 MG tablet Take 1 tablet (650 mg) by mouth 2 times daily  Qty: 60 tablet, Refills: 0    Associated Diagnoses: Acute renal failure, unspecified acute renal failure type (H)      ticagrelor (BRILINTA) 90 MG tablet Take 1 tablet (90 mg) by mouth 2 times daily  Qty: 60 tablet, Refills: 0    Associated Diagnoses: Coronary artery disease involving native heart with angina pectoris, unspecified vessel or lesion type (H)         CONTINUE these medications which have NOT CHANGED    Details   aspirin (ASA) 81 MG chewable tablet Take 81 mg by mouth daily      melatonin 3 MG tablet Take 1 tablet (3 mg) by mouth nightly as needed for sleep  Qty:      Associated Diagnoses: Debility      polyethylene glycol (MIRALAX) 17 g packet Take 17 g by mouth daily as needed for constipation    Associated Diagnoses: Debility      acetaminophen (TYLENOL) 325 MG tablet Take 2 tablets (650 mg) by mouth every 6 hours as needed for mild pain    Associated Diagnoses: Debility      calcium polycarbophil (FIBERCON) 625 MG tablet Take 2 tablets by mouth daily         STOP taking these medications       bisacodyl (DULCOLAX) 10 MG suppository Comments:   Reason for Stopping:         cefpodoxime (VANTIN) 200 MG tablet Comments:   Reason for Stopping:         guaiFENesin-dextromethorphan (ROBITUSSIN DM) 100-10 MG/5ML syrup Comments:   Reason for Stopping:         insulin aspart (NOVOLOG PEN) 100 UNIT/ML pen Comments:   Reason for Stopping:         insulin aspart (NOVOLOG PEN) 100 UNIT/ML pen Comments:   Reason for Stopping:         ipratropium - albuterol 0.5 mg/2.5 mg/3 mL (DUONEB) 0.5-2.5 (3) MG/3ML neb solution Comments:   Reason for Stopping:         senna-docusate (SENOKOT-S/PERICOLACE) 8.6-50 MG tablet Comments:   Reason for Stopping:                  DISCHARGE INSTRUCTIONS AND FOLLOW UP  Discharge Procedure Orders   Home care nursing referral   Referral Priority: Routine Referral Type: Home Health Therapies & Aides   Number of Visits Requested: 1     Home Care PT Referral for Hospital Discharge   Referral Priority: Routine Referral Type: Home Health Therapies & Aides   Number of Visits Requested: 1     Home Care OT Referral for Hospital Discharge   Referral Priority: Routine   Number of Visits Requested: 1     Reason for your hospital stay   Order Comments: Admitted for rehabilitation following prolonged hospitalization in setting of acute kidney injury, and rhabdomyolysis.     Adult [facility] Follow-up and recommended labs and tests   Order Comments: Follow up primary care and cardiology as scheduled.     Appointments on [facility] and/or [facility] (with [facility] or [facility] provider or service). Call [phone] if you haven't heard regarding these appointments within 7 days of discharge.     Activity   Order Comments: Your activity upon discharge: activity as tolerated up with assist from family as needed.     Order Specific Question Answer Comments   Is discharge order? Yes      When to contact your care team   Order Comments: Call your primary doctor if you have any of the following: increased pain, increased nausea/vomiting, fever or any other concerning symptoms.     Discharge Instructions   Order Comments: Due to significant acid reflux recommend: limiting foods with increase acid reflux risk (citrus, tomatoes, caffeine, mint, etc)  Sit up right when eating and for 30+ minutes after eating  Eat small frequent meals.   Follow up acid reflux with primary care.     MD face to face encounter   Order Comments: Documentation of Face to Face and Certification for Home Health Services    I certify that patient: Breana Boudreaux is under my care and that I, or a nurse practitioner or physician's assistant working with me, had a face-to-face encounter that meets  the physician face-to-face encounter requirements with this patient on: 4/7/2021.    This encounter with the patient was in whole, or in part, for the following medical condition, which is the primary reason for home health care: impaired strength/ activity tolerance.    I certify that, based on my findings, the following services are medically necessary home health services: Nursing, Occupational Therapy and Physical Therapy.    My clinical findings support the need for the above services because: Nurse is needed: assess home safety, medication management., Occupational Therapy Services are needed to assess and treat cognitive ability and address ADL safety due to impairment in functional mobility/ activity tolerance. and Physical Therapy Services are needed to assess and treat the following functional impairments: strength/ activity tolerance.    Further, I certify that my clinical findings support that this patient is homebound (i.e. absences from home require considerable and taxing effort and are for medical reasons or [employer] services or infrequently or of short duration when for other reasons) because: Requires assistance of another person or specialized equipment to access medical services because patient: Is unable to operate assistive equipment on their own...    Based on the above findings. I certify that this patient is confined to the home and needs intermittent skilled nursing care, physical therapy and/or speech therapy.  The patient is under my care, and I have initiated the establishment of the plan of care.  This patient will be followed by a physician who will periodically review the plan of care.  Physician/Provider to provide follow up care: Edwardo Garcia    Attending hospital physician (the Medicare certified [facility] provider): Chip Napier DO  Physician Signature: See electronic signature associated with these discharge orders.  Date: 4/7/2021     Full Code     Order Specific Question  Answer Comments   Code status determined by: Discussion with patient/ legal decision maker      Diet   Order Comments: Follow this diet upon discharge:  Soft diet. Avoid crunchy, chewy, sticky foods.     Also recommend you avoid foods that trigger acid reflux: this includes citrus, tomatoes, caffeine, alcohol, garlic, mint.     Order Specific Question Answer Comments   Is discharge order? Yes           PHYSICAL EXAMINATION    Most recent Vital Signs:   Vitals:    04/06/21 0757 04/06/21 2051 04/06/21 2250 04/07/21 0934   BP: 113/72 127/65 (!) 121/91 130/76   BP Location: Left arm Left arm Left arm Left arm   Pulse: 111 96 110 104   Resp: 20 20 22   Temp: 98.3  F (36.8  C) 98.5  F (36.9  C)  98.2  F (36.8  C)   TempSrc: Oral Oral  Oral   SpO2: 95% 97%  98%   Weight:       Height:       General: awake alert nad  Resp: non labored on room air  Extremities: soft non tender no edema  MSK/Neuro: alert up ambulating with walker       40 minutes spent in discharge, including >50% in counseling and coordination of care, medication review and plan of care recommended on follow up.     Patient was evaluated on day of discharge by attending physician, Chip Napier DO, who agrees with plan of care.    Discharge summary was forwarded to Edwardo Garcia (PCP) at the time of discharge, so as to bridge from hospital to outpatient care.     It was our pleasure to care for Breana Boudreaux during this hospitalization. Please do not hesitate to contact me should there be questions regarding the hospital course or discharge plan.          Ivett Constantino PA-C  Physical Medicine and Rehabilitation

## 2021-04-07 NOTE — PROGRESS NOTES
[facility]   Acute Rehabilitation Unit  Daily progress note    INTERVAL HISTORY  Patient seen and examined this AM.  No acute overnight events.  Denies chest pain, no fever or chills and no abdominal pain.  Will go today for R internal jugular removal from IR.  Patient wants to go home soon.  She is making progress and plan is for 4/9 if family support.      Function:  OT:  ADLs: Pt requires set-up with donning shirt seated and min A with LBD tasks with FWW. Pt requires SBACGA with STS with FWW pending surface height and SBA with functional mobility with FWW in BR. Pt requires SBA with toilet transfer with FWW. Pt requires SBA with G/H tasks standing at EOS with FWW. Pt limited by back pain with standing tolerance.   IADLs: Will further assess. Pt has supportive family to assist upon discharge.   Vision/Cognition: Pt wears reading glasses. Will further assess cognition.      ROS: 10 point ROS neg other than the symptoms noted above in the HPI.        MEDICATIONS  Scheduled meds    aspirin  81 mg Oral Daily     calcium polycarbophil  1,250 mg Oral Daily     famotidine  20 mg Oral Daily     ferrous sulfate  325 mg Oral Daily with breakfast     heparin lock flush  5-10 mL Intracatheter Q24H     insulin aspart  1-3 Units Subcutaneous TID AC     insulin aspart  1-3 Units Subcutaneous At Bedtime     lactobacillus rhamnosus (GG)  1 capsule Oral BID     lidocaine  1 patch Transdermal Q24h    And     lidocaine   Transdermal Q8H     magnesium oxide  400 mg Oral BID     metoprolol tartrate  12.5 mg Oral BID     multivitamin RENAL  1 tablet Oral Daily     omeprazole  40 mg Oral BID AC     potassium chloride  20 mEq Oral BID     psyllium  1 capsule Oral Daily     scopolamine  1 patch Transdermal Q72H    And     scopolamine   Transdermal Q8H     sodium bicarbonate  650 mg Oral BID     ticagrelor  90 mg Oral BID     PRN meds:  acetaminophen, benzonatate, glucose **OR** dextrose **OR**  "glucagon, guaiFENesin-dextromethorphan, heparin lock flush, ipratropium - albuterol 0.5 mg/2.5 mg/3 mL, melatonin, polyethylene glycol, prochlorperazine, senna-docusate, sodium chloride (PF), sodium chloride 0.9%    PHYSICAL EXAM  /76 (BP Location: Left arm)   Pulse 104   Temp 98.2  F (36.8  C) (Oral)   Resp 22   Ht 1.499 m (4' 11\")   Wt 56 kg (123 lb 6.4 oz)   SpO2 98%   BMI 24.92 kg/m    Gen: Pleasant female, resting in bed   HEENT: NCAT, EOMI  Cardio: RRR, normal S1/S2  Pulm: CTAB, non labored   Abd: Soft, ND, NT, BS+  Ext: No LE tenderness, no edema  Neuro/MSK: Speech clear and fluent per , moving limbs antigravity,  strength symmetric.    LABS    Lab Results   Component Value Date    WBC 8.6 04/07/2021     Lab Results   Component Value Date    RBC 2.73 04/07/2021     Lab Results   Component Value Date    HGB 8.4 04/07/2021     Lab Results   Component Value Date    HCT 26.7 04/07/2021     Lab Results   Component Value Date    MCV 98 04/07/2021     Lab Results   Component Value Date    MCH 30.8 04/07/2021     Lab Results   Component Value Date    MCHC 31.5 04/07/2021     Lab Results   Component Value Date    RDW 18.3 04/07/2021     Lab Results   Component Value Date     04/07/2021         Last Comprehensive Metabolic Panel:  Sodium   Date Value Ref Range Status   04/07/2021 143 133 - 144 mmol/L Final     Potassium   Date Value Ref Range Status   04/07/2021 3.3 (L) 3.4 - 5.3 mmol/L Final     Chloride   Date Value Ref Range Status   04/07/2021 110 (H) 94 - 109 mmol/L Final     Carbon Dioxide   Date Value Ref Range Status   04/07/2021 24 20 - 32 mmol/L Final     Anion Gap   Date Value Ref Range Status   04/07/2021 9 3 - 14 mmol/L Final     Glucose   Date Value Ref Range Status   04/07/2021 97 70 - 99 mg/dL Final     Urea Nitrogen   Date Value Ref Range Status   04/07/2021 15 7 - 30 mg/dL Final     Creatinine   Date Value Ref Range Status   04/07/2021 2.54 (H) 0.52 - 1.04 mg/dL " Final     GFR Estimate   Date Value Ref Range Status   04/07/2021 20 (L) >60 mL/min/[1.73_m2] Final     Comment:     Non  GFR Calc  Starting 12/18/2018, serum creatinine based estimated GFR (eGFR) will be   calculated using the Chronic Kidney Disease Epidemiology Collaboration   (CKD-EPI) equation.       Calcium   Date Value Ref Range Status   04/07/2021 8.2 (L) 8.5 - 10.1 mg/dL Final         ASSESSMENT AND PLAN  Breana Boudreaux is a 61 year old woman with CAD s/p recent stenting 2/3/21, DM type 2, HTN, RLS, who presented 2/21/21 with shortness of breath found to be in SHYANNE with lactic acidosis, rhabdomyolysis, with other electrolyte abnormalities was started on dialysis, hospital stay complicated by GI bleed, UTI, thrombocytopenia,  discharged to LTAC 3/12 last dialysis 3/13. Admitted to acute rehab 3/26/21. Briefly discharged back to acute hospital 3/28-3/30, then readmitted to ARU on 3/30/21.     Admission to acute inpatient rehab rhabdomyolysis  Impairment group code: 03.8     1. PT, OT 90 minutes of each on a daily basis, in addition to rehab nursing and close management of physiatrist.       2. Impairment of ADL's: Noted to have impaired strength, impaired activity tolerance, leading to decreased ability to independently complete ADL's.  Will benefit from ongoing OT with goal for MOD I with basic ADLs.      3. Impairment of mobility:  Noted to have impaired strength, impaired activity tolerance, and impaired balance leading to decreased mobility.  Will benefit from ongoing PT with goal for CHERYL with basic mobility.      4. Medical Conditions  Acute Kidney Injury  Lactic acidosis-   Rhabdomyolysis  Presented 2/23/21 with SHYANNE, lactic acidosis, rhabdo CK 80,000 requiring HD felt to be secondary to statin. Last HD 3/13  -trend weights, intake/output  -trend BMP  -continue to hold statin  -continue to hold metformin  -started on sodium bicarb 650 bid 3/26 per discharging  facility     Urosepsis  Possible Healthcare Acquired Pneumonia  - Cefpodoxime 200 mg qDay x4 days - completed      CAD- s/p PCI 2/3/21 on brilinta and asa.  -continue dual antiplatelet therapy  -metoprolol 12.5 mg bid dose reduced 3/26     HTN- with hypotension during LTAC stay with metoprolol dose reduced 3/26.   -monitor BP  -continue metoprolol 12.5 mg bid     Nausea/vomiting- reports ongoing since hospitalization 2/21 told was in setting of renal failure though has not improved per her report. Last QTc 3/16 486.    -ppi as above  -4/1: Scopolamine patch ordered  -encourage intake  -prn compazine     QTc- mildly prolonged 486 3/16  -routine EKG eval Qtc-      Hypomagnesemia, Hypokalemia  - continue mag oxide  - 4/1: Replete 40 mEq K; 4/2 order replacement protocol  -Mag WNL today, however, K still low, will contiue supplement -4/7       Recent GI bleed- with edg and flex sig 2/22/21 with resolution.   -monitor  -continue ppi bid, Pepcid started      Anemia- in setting of critical illness, renal failure.  Hgb 7.3 3/30  -continue iron supplement daily  -trend hgb up to 8.4 on 4/7     Leucocytosis-  WBC 8.5 3/30, 8.6 on 4/7  -trend  -monitor for signs/symptoms of infection     DM type 2- Hgb A1c 2/21/21 9.2% previously on metformin, lantus, metformin held in setting of recent lactic acidosis. bg overall stable on ssi, though intake reported as poor due to nausea/ vomiting  -check bg qid  -ssi- will taper off or add long acting pending bg levels during rehab stay.   --has not used insulin in last several days. Will follow-up outpatient for continued management.       5. Adjustment to disability:  Clinical psychology to eval and treat as indicated  6. FEN: regular- cardiac- though appetite poor and n/v  7. Bowel: Schedule pysllium in attempt to firm up stools. If patient remains incontinent, will continue starting bowel program. Will also order probiotic as patient has been on antibiotics  8. Bladder: monitor as  recovering renal failure  9. DVT Prophylaxis: mechanical  10. GI Prophylaxis: ppi bid  11. Code: full  12. Disposition: home  13. ELOS: 4/9/21  14. Rehab prognosis:  good  15. Follow up Appointments on Discharge: pcp, cardiology        Chip Naiper, DO  Physical Medicine & Rehabilitation        I spent a total of 25 minutes face to face and coordinating care of Breana Boudreaux. Over 50% of my time on the unit was spent counseling the patient and /or coordinating care regarding debility 2/2 prolonged medical course.

## 2021-04-07 NOTE — PROGRESS NOTES
Pt A&O. Plan to remove CVC 4/7 with IR. Discharge plan for this Thursday. Cont bladder; LBM 4/6. Ambulates to bathroom with assist of one, gait belt and walker. C/o nausea; prn compazine effective. Educated on GERD preventative meaures and to avoid citrus juices; pt and  verbalized understanding. Pepcid added to med regimen today. Call light within reach. Continue with current POC.

## 2021-04-07 NOTE — PROGRESS NOTES
Per [facility], SOC Sat 04/17. [facility] HC intake looking at partnering HC agencies to see if quicker SOC available. Awaiting update.     Per PT, pt son coming in for FT tomorrow morning and ok with plan to discharge home after. Per PT, pt son expressed concerns about pt MH. SW will meet with pt son prior to discharge to educate/inform RE: HC and also provide MH resources (relating to ong culture).     SW available if additional needs arise.     BENSON Juarez, [facility] Acute Rehab Unit   Phone: [phone]  I   Pager: [phone]

## 2021-04-08 ENCOUNTER — APPOINTMENT (OUTPATIENT)
Dept: OCCUPATIONAL THERAPY | Facility: CLINIC | Age: 61
End: 2021-04-08
Payer: COMMERCIAL

## 2021-04-08 ENCOUNTER — PATIENT OUTREACH (OUTPATIENT)
Dept: CARE COORDINATION | Facility: CLINIC | Age: 61
End: 2021-04-08

## 2021-04-08 VITALS
HEART RATE: 105 BPM | RESPIRATION RATE: 20 BRPM | WEIGHT: 123.4 LBS | DIASTOLIC BLOOD PRESSURE: 70 MMHG | HEIGHT: 59 IN | TEMPERATURE: 97.3 F | BODY MASS INDEX: 24.88 KG/M2 | SYSTOLIC BLOOD PRESSURE: 105 MMHG | OXYGEN SATURATION: 95 %

## 2021-04-08 LAB — GLUCOSE BLDC GLUCOMTR-MCNC: 107 MG/DL (ref 70–99)

## 2021-04-08 PROCEDURE — 250N000013 HC RX MED GY IP 250 OP 250 PS 637: Performed by: STUDENT IN AN ORGANIZED HEALTH CARE EDUCATION/TRAINING PROGRAM

## 2021-04-08 PROCEDURE — 97535 SELF CARE MNGMENT TRAINING: CPT | Mod: GO | Performed by: OCCUPATIONAL THERAPIST

## 2021-04-08 PROCEDURE — 250N000013 HC RX MED GY IP 250 OP 250 PS 637: Performed by: PHYSICIAN ASSISTANT

## 2021-04-08 PROCEDURE — 99239 HOSP IP/OBS DSCHRG MGMT >30: CPT | Performed by: PHYSICIAN ASSISTANT

## 2021-04-08 PROCEDURE — 999N001017 HC STATISTIC GLUCOSE BY METER IP

## 2021-04-08 RX ADMIN — TICAGRELOR 90 MG: 90 TABLET ORAL at 09:35

## 2021-04-08 RX ADMIN — ASPIRIN 81 MG CHEWABLE TABLET 81 MG: 81 TABLET CHEWABLE at 09:34

## 2021-04-08 RX ADMIN — FERROUS SULFATE TAB 325 MG (65 MG ELEMENTAL FE) 325 MG: 325 (65 FE) TAB at 09:34

## 2021-04-08 RX ADMIN — Medication 1 TABLET: at 09:35

## 2021-04-08 RX ADMIN — Medication 12.5 MG: at 09:34

## 2021-04-08 RX ADMIN — POTASSIUM CHLORIDE 20 MEQ: 750 TABLET, EXTENDED RELEASE ORAL at 09:34

## 2021-04-08 RX ADMIN — MAGNESIUM OXIDE TAB 400 MG (241.3 MG ELEMENTAL MG) 400 MG: 400 (241.3 MG) TAB at 09:33

## 2021-04-08 RX ADMIN — SODIUM BICARBONATE 650 MG TABLET 650 MG: at 09:33

## 2021-04-08 RX ADMIN — BENZONATATE 100 MG: 100 CAPSULE ORAL at 06:08

## 2021-04-08 RX ADMIN — FAMOTIDINE 20 MG: 20 TABLET ORAL at 09:34

## 2021-04-08 RX ADMIN — OMEPRAZOLE 40 MG: 20 CAPSULE, DELAYED RELEASE ORAL at 06:08

## 2021-04-08 RX ADMIN — CALCIUM POLYCARBOPHIL 1250 MG: 625 TABLET, FILM COATED ORAL at 09:34

## 2021-04-08 NOTE — PROGRESS NOTES
[facility]   Due to a high volume of referrals [facility] has transferred this patient's home care episode to their deferral partner [facility] . [facility] has sent all orders and demographics to [facility].

## 2021-04-08 NOTE — PLAN OF CARE
FOCUS/GOAL  Bowel management, Bladder management, Nutrition/Feeding/Swallowing precautions, Pain management, Wound care management, Mobility, and Cognition/Memory/Judgment/Problem solving    ASSESSMENT, INTERVENTIONS AND CONTINUING PLAN FOR GOAL:    A&Ox4, Ax1 WW. Incontinent of bowels this shift, voiding in toilet continent.  brought food for patient, ate 100% BG 95 and 128. Denied pain, N/T, N/V. Dressing to right chest CDI. Prn Tessalon given for infrequent cough with relief. Asymptomatic covid result negative. Patient had routine EKG to evaluate Qtc interval, see results for details. Will continue with POC

## 2021-04-08 NOTE — PLAN OF CARE
FOCUS/GOAL  Discharge planning    ASSESSMENT, INTERVENTIONS AND CONTINUING PLAN FOR GOAL:  Pt is alert and oriented x4. Denies any pain, SOB, nausea. Pt's sons present for discharge education including some transfer education from therapists. Discharge instructions reviewed with patient and sons per [facility] policy. Left unit with family transport at 1042. No other concerns at this time.

## 2021-04-08 NOTE — PLAN OF CARE
Occupational Therapy Discharge Summary    Reason for therapy discharge:    Discharged to home with home therapy.    Progress towards therapy goal(s). See goals on Care Plan in [facility] electronic health record for goal details.  Goals met    Therapy recommendation(s):    Continued therapy is recommended.  Rationale/Recommendations:  ADL/IADL retraining, DME/AE set up and recommendations, home safety evaluation, functional mobility, functional strengthening and endurance.     Breana Boudreaux is a 61 year old woman with CAD s/p recent stenting 2/3/21, DM type 2, HTN, RLS, who presented 2/21/21 with shortness of breath found to be in [place] with lactic acidosis, rhabdomyolysis, with other electrolyte abnormalities was started on dialysis, hospital stay complicated by GI bleed, UTI, thrombocytopenia, discharged to LTAC 3/12 last dialysis 3/13. Admitted to acute rehab 3/26/21. Briefly discharged back to acute hospital 3/28-3/30, then readmitted to ARU on 3/30/21.    ADLs/IADLs: Pt requires set-up with donning shirt seated and min A with LBD tasks with FWW. Pt requires SBA/CGA with STS with FWW pending surface height and SBA with functional mobility with FWW in BR. Pt requires SBA with toilet transfer with FWW. Pt requires SBA with G/H tasks standing at EOS with FWW. Pt limited by back pain with standing tolerance. Family providing assistance with IADLs.    DME/AE: Gait belt, stair railing, fww, extended tub bench.    Caregiver support: Training completed 4/8. Family supportive and able to provide recommended level of assistance.

## 2021-04-08 NOTE — PROGRESS NOTES
[facility] Intake searching other local HC agencies with quicker SOC. [facility] plans to call pt son and update when agency is determined and accepted. SW missed pt and pt son before discharge. SW called [facility]: [phone] and provided update on HC set up and anticipated f/u from [facility]. Pt son expressed understanding, agreement and appreciation. Discussed resources in the community for [facility], specifically regarding pt son's concerns about mental health. Pt son given  direct number if additional needs arise.     BENSON Juarez, [facility]   Phone: [phone]  I   Pager: [phone]

## 2021-04-08 NOTE — PLAN OF CARE
FOCUS/GOAL  Medical management and Discharge planning    ASSESSMENT, INTERVENTIONS AND CONTINUING PLAN FOR GOAL:  Patient is scheduled to discharge today,stated not sure when her ride will be here. Her discharges med to be  at her local community pharmacy . Up to the bathroom twice with SBA  this shift. Voided both times and had smear  BM in her depend.  Good night of sleep with infrequent cough during noc, asked for PRN med toward the end of the shift. Received Tessalon at 06:30. Denied pain. Will continue with POC.

## 2021-04-10 LAB — INTERPRETATION ECG - MUSE: NORMAL

## 2021-06-07 NOTE — TELEPHONE ENCOUNTER
Verbal  Consent obtained from patient to discuss with her son Chris Seaman.    Patient directed to [email or web address] to evaluate for Covid 19    COVID 19 Nurse Triage Plan    Please be aware that novel coronavirus (COVID-19) may be circulating in the community. If you develop symptoms such as fever, cough, or SOB or if you have concerns about the presence of another infection including coronavirus (COVID-19), please contact your health care provider or visit [email or web address].     Patient HAS known exposure, fever, cough or SOB in addition to reason for call. Patient referred to virtual visit with a provider through [facility] (Preferred option). **Follow System Ambulatory Workflow for COVID 19 on instructions on how to access [employer]**     Instructions Given to Patient  Your symptoms could be due to COVID-19, but it also could be due to a number of other respiratory illnesses.  We are not doing testing at this time for COVID-19 unless symptoms are severe enough to require hospitalization.  It is recommended that you stay home to prevent the spread of your illness.  To do this follow these instructions:    Regardless of if you have been tested or not:  Patient who have symptoms (cough, fever, or shortness of breath), need to isolate for 7 days from when symptoms started AND 72 hours after fever resolves (without fever reducing medications) AND improvement of respiratory symptoms (whichever is longer).      Isolate yourself at home (in own room/own bathroom if possible)    Do Not allow any visitors    Do Not go to work or school    Do Not go to Religion,  centers, shopping, or other public places.    Do Not shake hands.    Avoid close and intimate contact with others (hugging, kissing).    Follow CDC recommendations for household cleaning of frequently touched services.     After the initial 7 days, continue to isolate yourself from household members as much as possible. To continue decrease the risk of community spread and  exposure, you and any members of your household should limit activities in public for 14 days after starting home isolation.     You can reference the following CDC link for helpful home isolation/care tips:  https://www.cdc.gov/coronavirus/2019-ncov/downloads/10Things.pdf    Protect Others:    Cover your mouth and nose with a mask, disposable tissue or wash cloth to avoid spreading germs to others.    Wash your hands and face frequently with soap and water.    Fever Medicines:    For fever relief, take acetaminophen or ibuprofen.    Treat fevers above 101  F (38.3  C) to lower fevers and make you more comfortable.     Acetaminophen (e.g., Tylenol): Take 650 mg (two 325 mg pills) by mouth every 4-6 hours as needed of regular strength Tylenol or 1,000 mg (two 500 mg pills) every 8 hours as needed of Extra Strength Tylenol.     Ibuprofen (e.g., Motrin, Advil): Take 400 mg (two 200 mg pills) by mouth every 6 hours as needed.     Acetaminophen is thought to be safer than ibuprofen or naproxen for people over 65 years old. Acetaminophen is in many OTC and prescription medicines. It might be in more than one medicine that you are taking. You need to be careful and not take an overdose. Before taking any medicine, read all the instructions on the package.    Caution - NSAIDs (e.g., ibuprofen, naproxen): Do not take nonsteroidal anti-inflammatory drugs (NSAIDs) if you have stomach problems, kidney disease, heart failure, or other contraindications to using this type of medicine. Do not take NSAID medicines for over 7 days without consulting your PCP. Do not take NSAID medicines if you are pregnant. Do not take NSAID medicines if you are also taking blood thinners.     Call Back If: Breathing difficulty develops or you become worse.    Thank you for limiting contact with others, wearing a simple mask to cover your cough, practice good hand hygiene habits and accessing our virtual services where possible to limit the spread  of this virus.    For more information about COVID19 and options for caring for yourself at home, please visit the CDC website at https://www.cdc.gov/coronavirus/2019-ncov/about/steps-when-sick.html  For more options for care at [facility], please visit our website at [email or web address]      Julianna Stockton RN

## 2021-06-07 NOTE — TELEPHONE ENCOUNTER
"Triage Call:  Call from patients son for concerns of:  Cough  SOB  Diagnosed with bronchitis and pneumonia about a week and half ago. Given prescriptions for prednisone, azithromycin and albuterol inhalor and completed yesterday  Had been given codein which helped with cough but now out    Reports shortness of breath, wheezing, \"hard to inhale\" and \"causes cough\"  Wheeze resolve with inhaler and using 3-4 times a day PRN  Symptoms started about a week and half to two weeks ago  Able to speak in full sentences. No shortness of breath with activity. Happens most with big breath in or coughing    Son with questions about COVID and testing. Reports he was told patient was to \"go and get tested\". Reviewed information below for COVID19 Nurse Triage Plan.    Call disconnected and unable to reach on call back x2. Unable to complete triage.    Disposition:  Contact provider for eVisit. Call ended before getting primary clinic information to assist with scheduling.      Reason for Disposition    [1] Diabetes mellitus or weak immune system (e.g., HIV positive, cancer chemo, splenectomy, organ transplant, chronic steroids) AND [2] new onset of fever > 100.0 F (37.8 C)    Protocols used: PNEUMONIA ON ANTIBIOTIC FOLLOW-UP CALL-A-    COVID 19 Nurse Triage Plan    Please be aware that novel coronavirus (COVID-19) may be circulating in the community. If you develop symptoms such as fever, cough, or SOB or if you have concerns about the presence of another infection including coronavirus (COVID-19), please contact your health care provider or visit [email or web address].     Patient HAS NO known exposure, fever, cough or SOB in addition to reason for call.    Additional General Information About COVID-19    COVID-19 - General Information:  Regardless of if you have been tested or not:  Patient who have symptoms (cough, fever, or shortness of breath), need to isolate for 7 days from when symptoms started AND 72 hours after fever resolves " (without fever reducing medications) AND improvement of respiratory symptoms (whichever is longer).      Isolate yourself at home (in own room/own bathroom if possible)    Do Not allow any visitors    Do Not go to work or school    Do Not go to Latter-day,  centers, shopping, or other public places.    Do Not shake hands.    Avoid close and intimate contact with others (hugging, kissing).    Follow CDC recommendations for household cleaning of frequently touched services.     After the initial 7 days, continue to isolate yourself from household members as much as possible. To continue decrease the risk of community spread and exposure, you and any members of your household should limit activities in public for 14 days after starting home isolation.     You can reference the following CDC link for helpful home isolation/care tips:  https://www.cdc.gov/coronavirus/2019-ncov/downloads/10Things.pdf    COVID-19 - Symptoms:     The COVID-19 can cause a respiratory illness, such as bronchitis or pneumonia.    The most common symptoms are: cough, fever, and shortness of breath.     Other symptoms are: body aches, chills, diarrhea, fatigue, headache, runny nose, and sore throat     COVID-19 - Exposure Risk Factors:    Exposure to a person who has been diagnosed with COVID-19 .    Travel from an area with recent local transmission of COVID-19 .    The CDC (www.cdc.gov) has the most up-to-date list of where the COVID-19 outbreak is occurring.    COVID-19 - Spreading:     The virus likely spreads through respiratory droplets produced when a person coughs or sneezes. These respiratory droplets can travel approximately 6 feet and can remain on surfaces.  Common disinfectants will kill the virus.    The CDC currently does not recommend healthy people wearing masks.    COVID-19 - Protect Yourself:     Avoid close contact with people known to have this new COVID-19 infection.    Wash hands often with soap and water or  alcohol-based hand .    Avoid touching the eyes, nose or mouth.       Thank you for limiting contact with others, wearing a simple mask to cover your cough, practice good hand hygiene habits and accessing our virtual services where possible to limit the spread of this virus.    For more information about COVID19 and options for caring for yourself at home, please visit the CDC website at https://www.cdc.gov/coronavirus/2019-ncov/about/steps-when-sick.html  For more options for care at [facility], please visit our website at [email or web address]      Nicolle Lemons RN [facility] 4/1/2020 5:56 PM

## 2022-02-11 ENCOUNTER — TRANSFERRED RECORDS (OUTPATIENT)
Dept: HEALTH INFORMATION MANAGEMENT | Facility: CLINIC | Age: 62
End: 2022-02-11
Payer: COMMERCIAL

## 2022-02-11 ENCOUNTER — TRANSCRIBE ORDERS (OUTPATIENT)
Dept: OTHER | Age: 62
End: 2022-02-11

## 2022-02-11 ENCOUNTER — MEDICAL CORRESPONDENCE (OUTPATIENT)
Dept: HEALTH INFORMATION MANAGEMENT | Facility: CLINIC | Age: 62
End: 2022-02-11
Payer: COMMERCIAL

## 2022-02-11 DIAGNOSIS — H40.1134 PRIMARY OPEN-ANGLE GLAUCOMA, BILATERAL, INDETERMINATE STAGE: ICD-10-CM

## 2022-02-11 DIAGNOSIS — E07.9 ORBITOPATHY ASSOCIATED WITH DISORDER OF THYROID: Primary | ICD-10-CM

## 2022-02-11 DIAGNOSIS — H05.89 ORBITOPATHY ASSOCIATED WITH DISORDER OF THYROID: Primary | ICD-10-CM

## 2022-02-14 ENCOUNTER — TRANSCRIBE ORDERS (OUTPATIENT)
Dept: OTHER | Age: 62
End: 2022-02-14

## 2022-02-14 DIAGNOSIS — H40.1190 PRIMARY OPEN ANGLE GLAUCOMA: Primary | ICD-10-CM

## 2022-02-17 NOTE — TELEPHONE ENCOUNTER
FUTURE VISIT INFORMATION      FUTURE VISIT INFORMATION:    Date: 3/17/22    Time: 9:30am    Location: [facility]  REFERRAL INFORMATION:    Referring provider:  Dr. Marcelo Mejias    Referring providers clinic:  [facility]  Reason for visit/diagnosis  Orbitopathy associated with disorder of thyroid, Primary open-angle glaucoma, bilateral, indeterminate stage  RECORDS REQUESTED FROM:       Clinic name Comments Records Status Imaging Status   [facility] Request for recs sent 2/17- received and sent to scanning EPIC

## 2022-03-14 ENCOUNTER — APPOINTMENT (OUTPATIENT)
Dept: INTERPRETER SERVICES | Facility: CLINIC | Age: 62
End: 2022-03-14
Payer: COMMERCIAL

## 2022-03-17 ENCOUNTER — PRE VISIT (OUTPATIENT)
Dept: OPHTHALMOLOGY | Facility: CLINIC | Age: 62
End: 2022-03-17

## 2022-06-03 ENCOUNTER — TRANSFERRED RECORDS (OUTPATIENT)
Dept: HEALTH INFORMATION MANAGEMENT | Facility: CLINIC | Age: 62
End: 2022-06-03
Payer: COMMERCIAL

## 2022-06-03 ENCOUNTER — MEDICAL CORRESPONDENCE (OUTPATIENT)
Dept: HEALTH INFORMATION MANAGEMENT | Facility: CLINIC | Age: 62
End: 2022-06-03
Payer: COMMERCIAL

## 2024-12-05 NOTE — H&P (VIEW-ONLY)
[facility]      Preoperative Consultation   Guilherme Boudreaux   : 1960   Gender: female    Date of Encounter: 2024    Nursing Notes:   Georgia Orozco MA  2024 10:11 AM  Signed  Guilherme Boudreaux is a 64 y.o. female (1960) who presents for preop evaluation undergoing procedure for treatment of RIGHT LUMBAR 4-5 AND LUMBAR 5 - SACRAL 1 TRANSFORAMINAL LUMBAR INTERBODY FUSION WITH BILATERAL LUMBAR 4-5 LAMINECTOMY USING STEALTH NAVIGATION .    Date of Surgery: 2024  Surgical Specialty: Orthopedic  Demarcus Verma MD - [facility]  Hospital/Surgical Facility: [facility]  Fax number: [phone]  Surgery type: outpatient  Primary Physician: Edwardo Garcia CMA 2024 10:02 AM         History of Present Illness     Patient has chronic back pain.  Currently takes gabapentin and hydrocodone for pain management.  She is scheduled to have a fusion with laminectomy.    Diabetes.  Checks her blood sugars regularly.  Typically blood sugars between 90s and 110s.  Feels like her diabetes is very well-controlled  Takes Humalog insulin 4 units in the morning and 7 units before lunch and dinner  Takes Lantus insulin 35 units at bedtime  Takes Ozempic    Coronary artery disease.  Has had couple stents placed few years ago. Had echocardiogram last year  Sees cardiologist. Takes plavix    For her hypertension, takes losartan     Review of Systems   A comprehensive review of systems was negative except for items noted in HPI.    Patient Active Problem List   Diagnosis Code     Vitamin D deficiency E55.9     HTN (hypertension) I10     Restless leg syndrome G25.81     Chronic bilateral low back pain with bilateral sciatica M54.42, M54.41, G89.29     Degenerative disc disease, lumbar M51.369     Foraminal stenosis of lumbar region M48.061     Central stenosis of spinal canal M48.00     CAD, multiple vessel I25.10     ACP (advance care planning) Z71.89     Ascending  aorta dilatation (HC) I77.810     Acute thrombosis of right basilic vein I82.611     Acute renal failure (ARF) (HC) N17.9     Advised about management of weight Z78.9     Acute kidney injury (HC) N17.9     Anemia of chronic renal failure N18.9, D63.1     Debility R53.81     Type 2 diabetes mellitus with kidney complication, with long-term current use of insulin (HC) E11.29, Z79.4     Neurogenic claudication R29.818     History of gastric ulcer Z87.11     Prolonged QT interval R94.31     Other chronic pain G89.29     Weakness generalized R53.1     Stage 3b chronic kidney disease (HC) N18.32     Anorectal anomaly 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 Max acetaminophen dose: 4000mg in 24 hrs.     blood sugar diagnostic (Accu-Chek Brandy Plus test strp) strip TEST FOUR TIMES DAILY     blood-glucose meter Dispense meter, test strips, lancets covered by pt ins. E11.9 IDDM type II - Test 4 time/day     clopidogreL (PLAVIX) 75 mg tablet Take 1 Tablet (75 mg) by mouth once daily in the morning.     Dexcom G7  for continuous blood glucose monitor (CGM) To be used to read blood sugars follow  directions.     Dexcom G7 Sensor for continuous blood glucose monitor (CGM) To be used to read blood sugars, follow  directions. Change sensor every 10 days     diclofenac topical (Voltaren Arthritis Pain) 1 % gel Apply 2 g topically to affected area(s) four times daily.     diphenhydrAMINE (BENADRYL) 25 mg  "capsule Take 1-2 Capsules (25-50 mg) by mouth every 4 hours if needed (itchiness).     famotidine (PEPCID) 20 mg tablet TAKE 1 TABLET (20 MG) BY MOUTH TWO TIMES DAILY.     gabapentin (NEURONTIN) 300 mg capsule TAKE ONE CAPSULE BY MOUTH TWICE DAILY     HYDROcodone-acetaminophen (NORCO) 5-325 mg per tablet Max acetaminophen dose: 4000 mg in 24 hrs.     insulin lispro, U-100, (HumaLOG KwikPen Insulin) 100 unit/mL inpn pen Product desired:  Inject 4 units at breakfast and 7 units at lunch and dinner     Insulin Needles, Disposable, (Kimi Pen Needle) 32 gauge x 5/32\" FOR ADMINISTERING INSULIN AT HOME 4 TIMES A DAY     lancets As directed 1 unit.. Test 4 times per day.     Lantus Solostar U-100 Insulin 100 unit/mL (3 mL) pen Inject 45 units subcutaneous once daily. Product desired: LANTUS SOLOSTAR (Patient taking differently: Inject 35 units subcutaneous once daily. Product desired: LANTUS SOLOSTAR)     levalbuterol (XOPENEX HFA) 45 mcg/actuation inhaler Inhale 1-2 Puffs by mouth every 4 hours if needed for Shortness Of Breath.     loratadine (CLARITIN) 10 mg tablet Take 1 Tablet (10 mg) by mouth once daily.     losartan (COZAAR) 25 mg tablet Take 1 Tablet (25 mg) by mouth once daily.     loteprednol (Lotemax) 0.5 % ophthalmic suspension 1 drop BOTH EYES THREE TIMES A DAY     metoprolol tartrate (LOPRESSOR) 25 mg tablet Take 1 Tablet (25 mg) by mouth two times daily.     naloxone (Narcan) 4 mg/actuation nasal spray Inhale 0.1 mL into affected nostril(s).     semaglutide (Ozempic) 2 mg/dose (8 mg/3 mL) subcutaneous pen Inject 2 mg subcutaneous once weekly.     sennosides (SENNA) 8.6 mg tablet Take 1 Tablet (8.6 mg) by mouth 2 times daily if needed for Constipation.     sucralfate (CARAFATE) 1 gram tablet Take 1 Tablet (1 g) by mouth two times daily before meals.     No current facility-administered medications for this visit.     Medications have been reviewed by me and are current to the best of my knowledge and ability. "     Allergies   Allergen Reactions     Statins-Hmg-Coa Reductase Inhibitors Other - Describe In Comment Field     Rhabdomyolysis.     Ace Inhibitors Cough     Jardiance [Empagliflozin] Other - Describe In Comment Field     Causes UTI for patient     Past Surgical History:   . Laterality Date     CATARACT EXTRACTION W/  INTRAOCULAR LENS IMPLANT Right 10/09/2017    Dr. Loyola     CATARACT EXTRACTION W/  INTRAOCULAR LENS IMPLANT Left 2017    Dr.. Loyola      SECTION       Social History     Tobacco Use     Smoking status: Never     Smokeless tobacco: Never   Vaping Use     Vaping status: Never Used   Substance Use Topics     Alcohol use: No     Drug use: No     Family History   Problem Relation Age of Onset     Good Health Mother      Good Health Father      Good Health Sister      Good Health Brother      Good Health Child      Cancer-breast No Family History          PAST DIFFICULTY WITH ANESTHESIA: None     Physical Exam   /72 (Cuff Site: Left Arm, Position: Sitting, Cuff Size: Adult Regular)   Pulse 89   Resp 16   Wt 58.1 kg (128 lb)   LMP  (LMP Unknown)   SpO2 98%   BMI 25.63 kg/m   Body mass index is 25.63 kg/m .    General Appearance: Pleasant, alert, appropriate appearance for age. No acute distress. Ambulates with cane  Head Exam: Normal. Normocephalic, atraumatic.  Eye Exam:  Normal external eye, conjunctiva, lids  Ear Exam: Normal TM's bilaterally. Normal auditory canals and external ears. Non-tender.  Nose Exam: Normal external nose, mucous membranes, and septum.  OroPharynx Exam:  Dental hygiene adequate. Normal buccal mucosa. Normal pharynx.  Neck Exam:  Supple, no masses or nodes.  Thyroid Exam: No nodules or enlargement.  Chest/Respiratory Exam: Normal chest wall and respirations. Clear to auscultation.  Breast Exam: No dimpling, nipple retraction or discharge. No masses or nodes.  Cardiovascular Exam: Regular rate and rhythm. S1, S2, no murmur, click, gallop, or  rubs.  Gastrointestinal Exam: Soft, non-tender, no masses or organomegaly.  Genitourinary Exam Female: External genitalia, vulva and vagina appear normal. Normal appearing cervix, no lesions. Bimanual exam reveals normal uterus and adnexa, nontender urethra and bladder.   Rectal Exam: Normal sphincter tone. No masses noted.  Lymphatic Exam: Non-palpable nodes in neck, clavicular, axillary, or inguinal regions.  Musculoskeletal Exam: Back is straight and non-tender, full ROM of upper and lower extremities.  Foot Exam: Left and right foot: good pedal pulses, no lesions, nail hygiene good.  Skin: no rash or abnormalities  Neurologic Exam: Nonfocal, normal gross motor, tone coordination and no tremor.  Psychiatric Exam: Alert and oriented - appropriate affect.       Assessment / Plan     The Pre-Op Tool    Recommendations      Intermediate Risk Procedure    Risk of CV Complication (RCRI)  9%    Current Cardiac Status  Good exertional capacity ( > 4 mets )  EF 70% (most recent echo 2023)    Cardiac History  History of coronary stent / PCI           Labs  HGB within last 30 days  Potassium within last 30 days  Creatinine within last 30 days  EKG  Baseline EKG within the last 12 months  CXR  Not indicated    Stress Testing  Not indicated    * Testing recommendations are intended to assist, but not direct, clinical decisions.           Type & Screen should be obtained by Anesthesia only if the risk of transfusion is > 5% for the procedure         Hold scheduled mealtime dose of Lispro Insulin (HumaLOG) on the day of the procedure. If using as sliding scale/correction insulin, continue to dose per scale to correct elevated glucoses.  Take 34 units (75% of the usual morning dose) of Glargine (Lantus, Basaglar, Semglee, Rezvoglar) insulin on the morning of the procedure.  Do not take oral semaglutide for 7 days prior to the procedure.  Hold Losartan (Cozaar) the evening before and/or morning of the procedure.  Continue taking  Metoprolol (Lopressor, Toprol); be sure to take the evening before and/or morning of the procedure.  Stop Clopidogrel (Plavix) 7 days prior to the procedure; start taking aspirin 81mg once daily through the procedure  Take your other medications as usual prior to the procedure  Hold vitamins and/or supplements for 1 week prior to the procedure  Hold fish oil for 2 weeks prior to the procedure  Okay to take Acetaminophen (Tylenol) up until the procedure  Hold / avoid NSAIDs (e.g. ibuprofen, naproxen) prior to procedure: 2 days for ibuprofen (Advil) and 4 days for naproxen (Aleve).    * Medication recommendations are not intended to be exhaustive; they are limited to common medications that are potentially dangerous if incorrectly managed          Labs  * Data supports elimination of  routine  laboratory testing in favor of focused,  indicated  testing based on medical co-morbidities. A 2009 study randomized 1061 patients undergoing ambulatory, non-cataract surgery to routine or to indicated testing. Perioperative adverse events were similar (Anesthesia & Analgesia 2009;108:467-75; Anesthesiol. Clin. 2016 Mar;34(1):43-58).  Stress Testing  * Data from high risk patients undergoing major vascular surgery have failed to demonstrate benefit from either preoperative stress testing or prophylactic revascularization. A large, observational study found low risk of major perioperative adverse events in patients able to achieve >=4 mets. Taken together with existing knowledge, for patients with known or suspected CAD, our experts recommend preoperative stress testing only if it is indicated regardless of the planned surgery (N Engl J Med 2004;351:2795-80; J Am Venancio Cardiol 2014;64:0551-7806; SHRUTHI 2020;324:274-290;).  Antiplatelet Therapy  * Continuation of P2Y12 inhibitors increases the risk of major bleeding. For patients taking a P2Y12 inhibitor (Clopidrogrel (Plavix) in this patient) monotherapy (not also on aspirin) who  are undergoing a procedure with low or intermediate bleeding risk, or total joint replacement, our experts recommend converting the P2Y12 inhibitor to aspirin 81mg for the preoperative period (JACC 2017;1861-70;  SHRUTHI 2017;120-1;  Alexsandra Intern Med. 2018;168:237-244).  RAAS Antagonist  * Hypotension during anesthesia is associated with continuing renin-angiotensin system (RAAS) antagonist. While it is unclear if holding RAAS antagonists reduces postoperative complications, in most circumstances our experts recommend holding RAAS antagonist 24 hours prior to surgery. (Postgrad Med J 2011;87:472-81; Anest 2017;126:16-27; BMC Anesthesiol 2018;18:26.)     Session ID: [id number]  Endnotes and bibliography available upon request: [email or web address]      Labs:   Component      Latest Ref Rng 12/5/2024  10:55 AM   GLUCOSE      65 - 99 mg/dL 123 (H)    BUN      7 - 25 mg/dL 32 (H)    CREATININE      0.50 - 1.05 mg/dL 1.10 (H)    eGFR      > OR = 60 mL/min/1.73m2 56 (L)    BUN/CREAT RATIO                6 - 22 (calc) 29 (H)    SODIUM      135 - 146 mmol/L 136    POTASSIUM      3.5 - 5.3 mmol/L 4.4    CHLORIDE      98 - 110 mmol/L 106    CO2,TOTAL      20 - 32 mmol/L 22    ELECTROLYTE BALANCE      7 - 17 mmol/L (calc) 8    CALCIUM      8.6 - 10.4 mg/dL 8.8    WHITE BLOOD COUNT              3.8 - 10.8 Thousand/uL 9.0    RED BLOOD COUNT                3.80 - 5.10 Million/uL 3.94    HEMOGLOBIN                     11.7 - 15.5 g/dL 12.0    HEMATOCRIT                     35.0 - 45.0 % 36.2    MCV                            80.0 - 100.0 fL 91.9    MCH                            27.0 - 33.0 pg 30.5    MCHC                           32.0 - 36.0 g/dL 33.1    RDW                            11.0 - 15.0 % 12.5    PLATELET COUNT                 140 - 400 Thousand/uL 289    MPV                            7.5 - 12.5 fL 10.0       Legend:  (H) High  (L) Low  ECG: yes    (Z01.810) Preoperative  cardiovascular examination  (primary encounter diagnosis)  (E11.29,  Z79.4) Type 2 diabetes mellitus with other diabetic kidney complication, with long-term current use of insulin (HC)  Hold Humalog insulin.  Take only 25 units of Lantus insulin on the morning of procedure  Hold Ozempic x 1 week prior to procedure  (I10) HTN (hypertension)  Plan: EKG 12 LEAD, [place] ECG ROUTINE ECG W/LEAST 12 LDS         W/I&R, BASIC METABOLIC PANEL, BASIC METABOLIC         PANEL  Hold losartan on day of procedure and resume postoperatively  (K21.9) Chronic GERD  Okay to take Pepcid with minimal sip of water  (M54.50,  G89.29) Chronic low back pain, unspecified back pain laterality, unspecified whether sciatica present  Okay to take gabapentin and hydrocodone  (I25.10) Coronary artery disease involving native coronary artery of native heart without angina pectoris  Continue with metoprolol 25 mg as prescribed  Hold Plavix 1 week prior to procedure  (N18.32) Stage 3b chronic kidney disease (HC)  Plan: CBC W PLT NO DIFF, CBC W PLT NO DIFF  (M54.50,  M79.604,  M79.605) Lumbar pain with radiation down both legs    Patient is NOT cleared for planned procedure.   Changes noted on EKG. Recommend urgent follow up with cardiologist for preop clearance.    Electronically Signed by:   Tana Verma CNP 12/5/2024 12:38 PM

## 2024-12-17 RX ORDER — DIPHENHYDRAMINE HCL 25 MG
25-50 CAPSULE ORAL EVERY 4 HOURS PRN
Status: ON HOLD | COMMUNITY
Start: 2024-07-18 | End: 2024-12-22

## 2024-12-17 RX ORDER — GABAPENTIN 300 MG/1
1 CAPSULE ORAL 2 TIMES DAILY
Status: ON HOLD | COMMUNITY
Start: 2021-12-23 | End: 2024-12-19

## 2024-12-17 RX ORDER — INSULIN GLARGINE 100 [IU]/ML
35 INJECTION, SOLUTION SUBCUTANEOUS EVERY MORNING
COMMUNITY
Start: 2022-01-12

## 2024-12-17 RX ORDER — LOSARTAN POTASSIUM 25 MG/1
1 TABLET ORAL DAILY
COMMUNITY
Start: 2024-07-18

## 2024-12-17 RX ORDER — LORATADINE 10 MG/1
1 TABLET ORAL DAILY
COMMUNITY
Start: 2024-07-18

## 2024-12-17 RX ORDER — SUCRALFATE 1 G/1
1 TABLET ORAL 2 TIMES DAILY
Status: ON HOLD | COMMUNITY
Start: 2021-12-17 | End: 2024-12-19

## 2024-12-17 RX ORDER — LOTEPREDNOL ETABONATE 5 MG/ML
1 SUSPENSION/ DROPS OPHTHALMIC 4 TIMES DAILY
COMMUNITY
Start: 2023-05-09

## 2024-12-17 RX ORDER — CLOPIDOGREL BISULFATE 75 MG/1
75 TABLET ORAL DAILY
Status: ON HOLD | COMMUNITY
Start: 2024-07-18 | End: 2024-12-22

## 2024-12-17 RX ORDER — SEMAGLUTIDE 2.68 MG/ML
2 INJECTION, SOLUTION SUBCUTANEOUS WEEKLY
COMMUNITY
Start: 2024-11-11

## 2024-12-17 RX ORDER — INSULIN LISPRO 100 [IU]/ML
4 INJECTION, SOLUTION INTRAVENOUS; SUBCUTANEOUS
COMMUNITY
Start: 2024-03-12

## 2024-12-17 RX ORDER — HYDROCODONE BITARTRATE AND ACETAMINOPHEN 5; 325 MG/1; MG/1
1 TABLET ORAL EVERY 6 HOURS PRN
Status: ON HOLD | COMMUNITY
Start: 2021-12-08 | End: 2024-12-22

## 2024-12-18 ENCOUNTER — ANESTHESIA EVENT (OUTPATIENT)
Dept: SURGERY | Facility: CLINIC | Age: 64
End: 2024-12-18
Payer: COMMERCIAL

## 2024-12-19 ENCOUNTER — ANESTHESIA (OUTPATIENT)
Dept: SURGERY | Facility: CLINIC | Age: 64
End: 2024-12-19
Payer: COMMERCIAL

## 2024-12-19 ENCOUNTER — HOSPITAL ENCOUNTER (INPATIENT)
Facility: CLINIC | Age: 64
DRG: 427 | End: 2024-12-19
Attending: ORTHOPAEDIC SURGERY | Admitting: ORTHOPAEDIC SURGERY
Payer: COMMERCIAL

## 2024-12-19 ENCOUNTER — APPOINTMENT (OUTPATIENT)
Dept: RADIOLOGY | Facility: CLINIC | Age: 64
DRG: 427 | End: 2024-12-19
Attending: ORTHOPAEDIC SURGERY
Payer: COMMERCIAL

## 2024-12-19 DIAGNOSIS — R53.81 DEBILITY: ICD-10-CM

## 2024-12-19 DIAGNOSIS — Z98.890 STATUS POST LUMBAR SURGERY: Primary | ICD-10-CM

## 2024-12-19 LAB
GLUCOSE BLDC GLUCOMTR-MCNC: 168 MG/DL (ref 70–99)
GLUCOSE BLDC GLUCOMTR-MCNC: 172 MG/DL (ref 70–99)
GLUCOSE BLDC GLUCOMTR-MCNC: 174 MG/DL (ref 70–99)
GLUCOSE BLDC GLUCOMTR-MCNC: 95 MG/DL (ref 70–99)
HOLD SPECIMEN: NORMAL
HOLD SPECIMEN: NORMAL

## 2024-12-19 PROCEDURE — 250N000013 HC RX MED GY IP 250 OP 250 PS 637

## 2024-12-19 PROCEDURE — C1762 CONN TISS, HUMAN(INC FASCIA): HCPCS | Performed by: ORTHOPAEDIC SURGERY

## 2024-12-19 PROCEDURE — 0SG00AJ FUSION OF LUMBAR VERTEBRAL JOINT WITH INTERBODY FUSION DEVICE, POSTERIOR APPROACH, ANTERIOR COLUMN, OPEN APPROACH: ICD-10-PCS | Performed by: ORTHOPAEDIC SURGERY

## 2024-12-19 PROCEDURE — 710N000010 HC RECOVERY PHASE 1, LEVEL 2, PER MIN: Performed by: ORTHOPAEDIC SURGERY

## 2024-12-19 PROCEDURE — 258N000003 HC RX IP 258 OP 636: Performed by: ANESTHESIOLOGY

## 2024-12-19 PROCEDURE — 999N000182 XR SURGERY OARM

## 2024-12-19 PROCEDURE — 370N000017 HC ANESTHESIA TECHNICAL FEE, PER MIN: Performed by: ORTHOPAEDIC SURGERY

## 2024-12-19 PROCEDURE — 258N000003 HC RX IP 258 OP 636

## 2024-12-19 PROCEDURE — 99222 1ST HOSP IP/OBS MODERATE 55: CPT | Performed by: INTERNAL MEDICINE

## 2024-12-19 PROCEDURE — 120N000001 HC R&B MED SURG/OB

## 2024-12-19 PROCEDURE — 0SG3071 FUSION OF LUMBOSACRAL JOINT WITH AUTOLOGOUS TISSUE SUBSTITUTE, POSTERIOR APPROACH, POSTERIOR COLUMN, OPEN APPROACH: ICD-10-PCS | Performed by: ORTHOPAEDIC SURGERY

## 2024-12-19 PROCEDURE — 250N000005 HC OR RX SURGIFLO HEMOSTATIC MATRIX 10ML 199102S OPNP: Performed by: ORTHOPAEDIC SURGERY

## 2024-12-19 PROCEDURE — 0SB20ZZ EXCISION OF LUMBAR VERTEBRAL DISC, OPEN APPROACH: ICD-10-PCS | Performed by: ORTHOPAEDIC SURGERY

## 2024-12-19 PROCEDURE — 0SG0071 FUSION OF LUMBAR VERTEBRAL JOINT WITH AUTOLOGOUS TISSUE SUBSTITUTE, POSTERIOR APPROACH, POSTERIOR COLUMN, OPEN APPROACH: ICD-10-PCS | Performed by: ORTHOPAEDIC SURGERY

## 2024-12-19 PROCEDURE — 250N000011 HC RX IP 250 OP 636: Performed by: NURSE ANESTHETIST, CERTIFIED REGISTERED

## 2024-12-19 PROCEDURE — 0SG30AJ FUSION OF LUMBOSACRAL JOINT WITH INTERBODY FUSION DEVICE, POSTERIOR APPROACH, ANTERIOR COLUMN, OPEN APPROACH: ICD-10-PCS | Performed by: ORTHOPAEDIC SURGERY

## 2024-12-19 PROCEDURE — 250N000013 HC RX MED GY IP 250 OP 250 PS 637: Performed by: INTERNAL MEDICINE

## 2024-12-19 PROCEDURE — 01NR0ZZ RELEASE SACRAL NERVE, OPEN APPROACH: ICD-10-PCS | Performed by: ORTHOPAEDIC SURGERY

## 2024-12-19 PROCEDURE — 250N000011 HC RX IP 250 OP 636

## 2024-12-19 PROCEDURE — 8E0WXBZ COMPUTER ASSISTED PROCEDURE OF TRUNK REGION: ICD-10-PCS | Performed by: ORTHOPAEDIC SURGERY

## 2024-12-19 PROCEDURE — 250N000011 HC RX IP 250 OP 636: Performed by: ANESTHESIOLOGY

## 2024-12-19 PROCEDURE — 250N000011 HC RX IP 250 OP 636: Performed by: ORTHOPAEDIC SURGERY

## 2024-12-19 PROCEDURE — 272N000001 HC OR GENERAL SUPPLY STERILE: Performed by: ORTHOPAEDIC SURGERY

## 2024-12-19 PROCEDURE — 999N000141 HC STATISTIC PRE-PROCEDURE NURSING ASSESSMENT: Performed by: ORTHOPAEDIC SURGERY

## 2024-12-19 PROCEDURE — 250N000025 HC SEVOFLURANE, PER MIN: Performed by: ORTHOPAEDIC SURGERY

## 2024-12-19 PROCEDURE — 250N000009 HC RX 250: Performed by: ORTHOPAEDIC SURGERY

## 2024-12-19 PROCEDURE — 0SB40ZZ EXCISION OF LUMBOSACRAL DISC, OPEN APPROACH: ICD-10-PCS | Performed by: ORTHOPAEDIC SURGERY

## 2024-12-19 PROCEDURE — 250N000009 HC RX 250: Performed by: ANESTHESIOLOGY

## 2024-12-19 PROCEDURE — 250N000009 HC RX 250: Performed by: NURSE ANESTHETIST, CERTIFIED REGISTERED

## 2024-12-19 PROCEDURE — 250N000009 HC RX 250

## 2024-12-19 PROCEDURE — C1713 ANCHOR/SCREW BN/BN,TIS/BN: HCPCS | Performed by: ORTHOPAEDIC SURGERY

## 2024-12-19 PROCEDURE — 250N000012 HC RX MED GY IP 250 OP 636 PS 637: Performed by: INTERNAL MEDICINE

## 2024-12-19 PROCEDURE — 360N000086 HC SURGERY LEVEL 6 W/ FLUORO, PER MIN: Performed by: ORTHOPAEDIC SURGERY

## 2024-12-19 PROCEDURE — 01NB0ZZ RELEASE LUMBAR NERVE, OPEN APPROACH: ICD-10-PCS | Performed by: ORTHOPAEDIC SURGERY

## 2024-12-19 PROCEDURE — 258N000003 HC RX IP 258 OP 636: Performed by: NURSE ANESTHETIST, CERTIFIED REGISTERED

## 2024-12-19 DEVICE — SPACER 6068096 CATALYFT PL LONG 9MM
Type: IMPLANTABLE DEVICE | Site: SPINE LUMBAR | Status: FUNCTIONAL
Brand: CATALYFT PL EXPANDABLE INTERBODY SYSTEM

## 2024-12-19 DEVICE — IMP ROD MEDT 5CM 1475501050: Type: IMPLANTABLE DEVICE | Site: SPINE LUMBAR | Status: FUNCTIONAL

## 2024-12-19 DEVICE — IMP ROD MEDT SOLERA PRE-BENT COBLAT CHROME 55MM 1475501055: Type: IMPLANTABLE DEVICE | Site: SPINE LUMBAR | Status: FUNCTIONAL

## 2024-12-19 DEVICE — IMP SCR MEDT BREAK-OFF SET SOLERA 4.75MM TI 5440030: Type: IMPLANTABLE DEVICE | Site: SPINE LUMBAR | Status: FUNCTIONAL

## 2024-12-19 DEVICE — MAGNETOS EASYPACK PUTTY 10CC 1-2MM USA
Type: IMPLANTABLE DEVICE | Site: SPINE LUMBAR | Status: FUNCTIONAL
Brand: MAGNETOS

## 2024-12-19 DEVICE — IMP SCR MEDT 4.75MM SOLERA 6.5X40MM MA 54840006540: Type: IMPLANTABLE DEVICE | Site: SPINE LUMBAR | Status: FUNCTIONAL

## 2024-12-19 DEVICE — GRAFT BONE FIBERS DBF 9ML INJ T50309 PRELOADED: Type: IMPLANTABLE DEVICE | Site: SPINE LUMBAR | Status: FUNCTIONAL

## 2024-12-19 DEVICE — IMP SCR MEDT 4.75MM SOLERA 6.5X50MM MA 54840006550: Type: IMPLANTABLE DEVICE | Site: SPINE LUMBAR | Status: FUNCTIONAL

## 2024-12-19 RX ORDER — PROCHLORPERAZINE MALEATE 5 MG/1
5 TABLET ORAL EVERY 8 HOURS PRN
Status: DISCONTINUED | OUTPATIENT
Start: 2024-12-19 | End: 2024-12-19

## 2024-12-19 RX ORDER — VANCOMYCIN HYDROCHLORIDE 1 G/20ML
INJECTION, POWDER, LYOPHILIZED, FOR SOLUTION INTRAVENOUS PRN
Status: DISCONTINUED | OUTPATIENT
Start: 2024-12-19 | End: 2024-12-19 | Stop reason: HOSPADM

## 2024-12-19 RX ORDER — SODIUM CHLORIDE 9 MG/ML
INJECTION, SOLUTION INTRAVENOUS CONTINUOUS
Status: DISCONTINUED | OUTPATIENT
Start: 2024-12-19 | End: 2024-12-21

## 2024-12-19 RX ORDER — ONDANSETRON 4 MG/1
4 TABLET, ORALLY DISINTEGRATING ORAL EVERY 30 MIN PRN
Status: DISCONTINUED | OUTPATIENT
Start: 2024-12-19 | End: 2024-12-19

## 2024-12-19 RX ORDER — DEXTROSE MONOHYDRATE 25 G/50ML
25-50 INJECTION, SOLUTION INTRAVENOUS
Status: DISCONTINUED | OUTPATIENT
Start: 2024-12-19 | End: 2024-12-22 | Stop reason: HOSPADM

## 2024-12-19 RX ORDER — LOTEPREDNOL ETABONATE 5 MG/ML
1 SUSPENSION/ DROPS OPHTHALMIC 4 TIMES DAILY
Status: DISCONTINUED | OUTPATIENT
Start: 2024-12-19 | End: 2024-12-22 | Stop reason: HOSPADM

## 2024-12-19 RX ORDER — NALOXONE HYDROCHLORIDE 0.4 MG/ML
0.2 INJECTION, SOLUTION INTRAMUSCULAR; INTRAVENOUS; SUBCUTANEOUS
Status: DISCONTINUED | OUTPATIENT
Start: 2024-12-19 | End: 2024-12-22 | Stop reason: HOSPADM

## 2024-12-19 RX ORDER — ONDANSETRON 4 MG/1
4 TABLET, ORALLY DISINTEGRATING ORAL EVERY 6 HOURS PRN
Status: DISCONTINUED | OUTPATIENT
Start: 2024-12-19 | End: 2024-12-22 | Stop reason: HOSPADM

## 2024-12-19 RX ORDER — HYDROMORPHONE HCL IN WATER/PF 6 MG/30 ML
0.2 PATIENT CONTROLLED ANALGESIA SYRINGE INTRAVENOUS EVERY 5 MIN PRN
Status: DISCONTINUED | OUTPATIENT
Start: 2024-12-19 | End: 2024-12-19 | Stop reason: HOSPADM

## 2024-12-19 RX ORDER — FENTANYL CITRATE 50 UG/ML
INJECTION, SOLUTION INTRAMUSCULAR; INTRAVENOUS PRN
Status: DISCONTINUED | OUTPATIENT
Start: 2024-12-19 | End: 2024-12-19

## 2024-12-19 RX ORDER — ONDANSETRON 2 MG/ML
INJECTION INTRAMUSCULAR; INTRAVENOUS PRN
Status: DISCONTINUED | OUTPATIENT
Start: 2024-12-19 | End: 2024-12-19

## 2024-12-19 RX ORDER — FENTANYL CITRATE 50 UG/ML
25 INJECTION, SOLUTION INTRAMUSCULAR; INTRAVENOUS EVERY 5 MIN PRN
Status: DISCONTINUED | OUTPATIENT
Start: 2024-12-19 | End: 2024-12-19 | Stop reason: HOSPADM

## 2024-12-19 RX ORDER — ONDANSETRON 2 MG/ML
4 INJECTION INTRAMUSCULAR; INTRAVENOUS EVERY 30 MIN PRN
Status: DISCONTINUED | OUTPATIENT
Start: 2024-12-19 | End: 2024-12-19 | Stop reason: HOSPADM

## 2024-12-19 RX ORDER — LOSARTAN POTASSIUM 25 MG/1
25 TABLET ORAL DAILY
Status: DISCONTINUED | OUTPATIENT
Start: 2024-12-19 | End: 2024-12-22 | Stop reason: HOSPADM

## 2024-12-19 RX ORDER — LEVALBUTEROL TARTRATE 45 UG/1
1-2 AEROSOL, METERED ORAL EVERY 4 HOURS PRN
COMMUNITY

## 2024-12-19 RX ORDER — FERROUS SULFATE 325(65) MG
325 TABLET ORAL
Status: DISCONTINUED | OUTPATIENT
Start: 2024-12-20 | End: 2024-12-22 | Stop reason: HOSPADM

## 2024-12-19 RX ORDER — OXYCODONE HYDROCHLORIDE 5 MG/1
5 TABLET ORAL
Status: DISCONTINUED | OUTPATIENT
Start: 2024-12-19 | End: 2024-12-19

## 2024-12-19 RX ORDER — GABAPENTIN 300 MG/1
300 CAPSULE ORAL
Status: COMPLETED | OUTPATIENT
Start: 2024-12-19 | End: 2024-12-19

## 2024-12-19 RX ORDER — MAGNESIUM HYDROXIDE 1200 MG/15ML
LIQUID ORAL PRN
Status: DISCONTINUED | OUTPATIENT
Start: 2024-12-19 | End: 2024-12-19 | Stop reason: HOSPADM

## 2024-12-19 RX ORDER — NALOXONE HYDROCHLORIDE 0.4 MG/ML
0.4 INJECTION, SOLUTION INTRAMUSCULAR; INTRAVENOUS; SUBCUTANEOUS
Status: DISCONTINUED | OUTPATIENT
Start: 2024-12-19 | End: 2024-12-22 | Stop reason: HOSPADM

## 2024-12-19 RX ORDER — PROPOFOL 10 MG/ML
INJECTION, EMULSION INTRAVENOUS PRN
Status: DISCONTINUED | OUTPATIENT
Start: 2024-12-19 | End: 2024-12-19

## 2024-12-19 RX ORDER — ALBUTEROL SULFATE 90 UG/1
1-2 INHALANT RESPIRATORY (INHALATION) EVERY 4 HOURS PRN
Status: DISCONTINUED | OUTPATIENT
Start: 2024-12-19 | End: 2024-12-22 | Stop reason: HOSPADM

## 2024-12-19 RX ORDER — NALOXONE HYDROCHLORIDE 0.4 MG/ML
0.1 INJECTION, SOLUTION INTRAMUSCULAR; INTRAVENOUS; SUBCUTANEOUS
Status: DISCONTINUED | OUTPATIENT
Start: 2024-12-19 | End: 2024-12-19 | Stop reason: HOSPADM

## 2024-12-19 RX ORDER — GABAPENTIN 100 MG/1
100 CAPSULE ORAL 3 TIMES DAILY
Status: DISCONTINUED | OUTPATIENT
Start: 2024-12-19 | End: 2024-12-22 | Stop reason: HOSPADM

## 2024-12-19 RX ORDER — NICOTINE POLACRILEX 4 MG
15-30 LOZENGE BUCCAL
Status: DISCONTINUED | OUTPATIENT
Start: 2024-12-19 | End: 2024-12-22 | Stop reason: HOSPADM

## 2024-12-19 RX ORDER — CEFAZOLIN SODIUM/WATER 2 G/20 ML
2 SYRINGE (ML) INTRAVENOUS SEE ADMIN INSTRUCTIONS
Status: DISCONTINUED | OUTPATIENT
Start: 2024-12-19 | End: 2024-12-19 | Stop reason: HOSPADM

## 2024-12-19 RX ORDER — PROCHLORPERAZINE MALEATE 10 MG
10 TABLET ORAL EVERY 6 HOURS PRN
Status: DISCONTINUED | OUTPATIENT
Start: 2024-12-19 | End: 2024-12-22 | Stop reason: HOSPADM

## 2024-12-19 RX ORDER — LORATADINE 10 MG/1
10 TABLET ORAL DAILY PRN
Status: DISCONTINUED | OUTPATIENT
Start: 2024-12-19 | End: 2024-12-19

## 2024-12-19 RX ORDER — DEXAMETHASONE SODIUM PHOSPHATE 4 MG/ML
4 INJECTION, SOLUTION INTRA-ARTICULAR; INTRALESIONAL; INTRAMUSCULAR; INTRAVENOUS; SOFT TISSUE
Status: DISCONTINUED | OUTPATIENT
Start: 2024-12-19 | End: 2024-12-19 | Stop reason: HOSPADM

## 2024-12-19 RX ORDER — LORATADINE 10 MG/1
10 TABLET ORAL DAILY
Status: DISCONTINUED | OUTPATIENT
Start: 2024-12-19 | End: 2024-12-22 | Stop reason: HOSPADM

## 2024-12-19 RX ORDER — ONDANSETRON 4 MG/1
4 TABLET, ORALLY DISINTEGRATING ORAL EVERY 30 MIN PRN
Status: DISCONTINUED | OUTPATIENT
Start: 2024-12-19 | End: 2024-12-19 | Stop reason: HOSPADM

## 2024-12-19 RX ORDER — ONDANSETRON 2 MG/ML
4 INJECTION INTRAMUSCULAR; INTRAVENOUS EVERY 30 MIN PRN
Status: DISCONTINUED | OUTPATIENT
Start: 2024-12-19 | End: 2024-12-19

## 2024-12-19 RX ORDER — HYDROMORPHONE HCL IN WATER/PF 6 MG/30 ML
0.2 PATIENT CONTROLLED ANALGESIA SYRINGE INTRAVENOUS
Status: DISCONTINUED | OUTPATIENT
Start: 2024-12-19 | End: 2024-12-22 | Stop reason: HOSPADM

## 2024-12-19 RX ORDER — OXYCODONE HYDROCHLORIDE 5 MG/1
10 TABLET ORAL
Status: DISCONTINUED | OUTPATIENT
Start: 2024-12-19 | End: 2024-12-19

## 2024-12-19 RX ORDER — DEXAMETHASONE SODIUM PHOSPHATE 4 MG/ML
INJECTION, SOLUTION INTRA-ARTICULAR; INTRALESIONAL; INTRAMUSCULAR; INTRAVENOUS; SOFT TISSUE PRN
Status: DISCONTINUED | OUTPATIENT
Start: 2024-12-19 | End: 2024-12-19

## 2024-12-19 RX ORDER — SODIUM CHLORIDE, SODIUM LACTATE, POTASSIUM CHLORIDE, CALCIUM CHLORIDE 600; 310; 30; 20 MG/100ML; MG/100ML; MG/100ML; MG/100ML
INJECTION, SOLUTION INTRAVENOUS CONTINUOUS
Status: DISCONTINUED | OUTPATIENT
Start: 2024-12-19 | End: 2024-12-19 | Stop reason: HOSPADM

## 2024-12-19 RX ORDER — HEPARIN SOD,PORCINE/0.9 % NACL 30K/1000ML
INTRAVENOUS SOLUTION INTRAVENOUS ONCE
Status: COMPLETED | OUTPATIENT
Start: 2024-12-19 | End: 2024-12-19

## 2024-12-19 RX ORDER — OXYCODONE HYDROCHLORIDE 5 MG/1
10 TABLET ORAL EVERY 4 HOURS PRN
Status: DISCONTINUED | OUTPATIENT
Start: 2024-12-19 | End: 2024-12-22 | Stop reason: HOSPADM

## 2024-12-19 RX ORDER — DEXAMETHASONE SODIUM PHOSPHATE 4 MG/ML
4 INJECTION, SOLUTION INTRA-ARTICULAR; INTRALESIONAL; INTRAMUSCULAR; INTRAVENOUS; SOFT TISSUE
Status: DISCONTINUED | OUTPATIENT
Start: 2024-12-19 | End: 2024-12-19

## 2024-12-19 RX ORDER — BUPIVACAINE HYDROCHLORIDE AND EPINEPHRINE 2.5; 5 MG/ML; UG/ML
INJECTION, SOLUTION EPIDURAL; INFILTRATION; INTRACAUDAL; PERINEURAL
Status: DISCONTINUED
Start: 2024-12-19 | End: 2024-12-19 | Stop reason: HOSPADM

## 2024-12-19 RX ORDER — HYDROMORPHONE HCL IN WATER/PF 6 MG/30 ML
0.4 PATIENT CONTROLLED ANALGESIA SYRINGE INTRAVENOUS
Status: DISCONTINUED | OUTPATIENT
Start: 2024-12-19 | End: 2024-12-22 | Stop reason: HOSPADM

## 2024-12-19 RX ORDER — POLYETHYLENE GLYCOL 3350 17 G/17G
17 POWDER, FOR SOLUTION ORAL DAILY
Status: DISCONTINUED | OUTPATIENT
Start: 2024-12-20 | End: 2024-12-22 | Stop reason: HOSPADM

## 2024-12-19 RX ORDER — ACETAMINOPHEN 325 MG/1
975 TABLET ORAL EVERY 8 HOURS
Status: DISCONTINUED | OUTPATIENT
Start: 2024-12-19 | End: 2024-12-22 | Stop reason: HOSPADM

## 2024-12-19 RX ORDER — ONDANSETRON 2 MG/ML
4 INJECTION INTRAMUSCULAR; INTRAVENOUS EVERY 6 HOURS PRN
Status: DISCONTINUED | OUTPATIENT
Start: 2024-12-19 | End: 2024-12-22 | Stop reason: HOSPADM

## 2024-12-19 RX ORDER — VANCOMYCIN HYDROCHLORIDE 1 G/20ML
INJECTION, POWDER, LYOPHILIZED, FOR SOLUTION INTRAVENOUS
Status: DISCONTINUED
Start: 2024-12-19 | End: 2024-12-19 | Stop reason: HOSPADM

## 2024-12-19 RX ORDER — BUPIVACAINE HYDROCHLORIDE AND EPINEPHRINE 2.5; 5 MG/ML; UG/ML
INJECTION, SOLUTION EPIDURAL; INFILTRATION; INTRACAUDAL; PERINEURAL PRN
Status: DISCONTINUED | OUTPATIENT
Start: 2024-12-19 | End: 2024-12-19 | Stop reason: HOSPADM

## 2024-12-19 RX ORDER — OXYCODONE HYDROCHLORIDE 5 MG/1
5 TABLET ORAL EVERY 4 HOURS PRN
Status: DISCONTINUED | OUTPATIENT
Start: 2024-12-19 | End: 2024-12-22 | Stop reason: HOSPADM

## 2024-12-19 RX ORDER — HYDROMORPHONE HCL IN WATER/PF 6 MG/30 ML
0.4 PATIENT CONTROLLED ANALGESIA SYRINGE INTRAVENOUS EVERY 5 MIN PRN
Status: DISCONTINUED | OUTPATIENT
Start: 2024-12-19 | End: 2024-12-19 | Stop reason: HOSPADM

## 2024-12-19 RX ORDER — MULTIVITAMIN,THERAPEUTIC
1 TABLET ORAL DAILY
Status: DISCONTINUED | OUTPATIENT
Start: 2024-12-20 | End: 2024-12-22 | Stop reason: HOSPADM

## 2024-12-19 RX ORDER — FENTANYL CITRATE 50 UG/ML
50 INJECTION, SOLUTION INTRAMUSCULAR; INTRAVENOUS EVERY 5 MIN PRN
Status: DISCONTINUED | OUTPATIENT
Start: 2024-12-19 | End: 2024-12-19 | Stop reason: HOSPADM

## 2024-12-19 RX ORDER — LIDOCAINE 40 MG/G
CREAM TOPICAL
Status: DISCONTINUED | OUTPATIENT
Start: 2024-12-19 | End: 2024-12-19 | Stop reason: HOSPADM

## 2024-12-19 RX ORDER — FAMOTIDINE 20 MG/1
20 TABLET, FILM COATED ORAL 2 TIMES DAILY PRN
Status: DISCONTINUED | OUTPATIENT
Start: 2024-12-19 | End: 2024-12-22 | Stop reason: HOSPADM

## 2024-12-19 RX ORDER — CEFAZOLIN SODIUM/WATER 2 G/20 ML
2 SYRINGE (ML) INTRAVENOUS
Status: COMPLETED | OUTPATIENT
Start: 2024-12-19 | End: 2024-12-19

## 2024-12-19 RX ORDER — PANTOPRAZOLE SODIUM 40 MG/1
40 TABLET, DELAYED RELEASE ORAL 2 TIMES DAILY
Status: DISCONTINUED | OUTPATIENT
Start: 2024-12-19 | End: 2024-12-22 | Stop reason: HOSPADM

## 2024-12-19 RX ORDER — CEFAZOLIN SODIUM 1 G/3ML
1 INJECTION, POWDER, FOR SOLUTION INTRAMUSCULAR; INTRAVENOUS EVERY 8 HOURS
Status: COMPLETED | OUTPATIENT
Start: 2024-12-19 | End: 2024-12-20

## 2024-12-19 RX ORDER — AMOXICILLIN 250 MG
1 CAPSULE ORAL 2 TIMES DAILY
Status: DISCONTINUED | OUTPATIENT
Start: 2024-12-19 | End: 2024-12-22 | Stop reason: HOSPADM

## 2024-12-19 RX ORDER — NALOXONE HYDROCHLORIDE 0.4 MG/ML
0.1 INJECTION, SOLUTION INTRAMUSCULAR; INTRAVENOUS; SUBCUTANEOUS
Status: DISCONTINUED | OUTPATIENT
Start: 2024-12-19 | End: 2024-12-19

## 2024-12-19 RX ORDER — BISACODYL 10 MG
10 SUPPOSITORY, RECTAL RECTAL DAILY PRN
Status: DISCONTINUED | OUTPATIENT
Start: 2024-12-22 | End: 2024-12-22 | Stop reason: HOSPADM

## 2024-12-19 RX ADMIN — FENTANYL CITRATE 50 MCG: 50 INJECTION INTRAMUSCULAR; INTRAVENOUS at 13:44

## 2024-12-19 RX ADMIN — PROPOFOL 200 MCG/KG/MIN: 10 INJECTION, EMULSION INTRAVENOUS at 07:55

## 2024-12-19 RX ADMIN — LIDOCAINE HYDROCHLORIDE 50 MG: 10 INJECTION, SOLUTION EPIDURAL; INFILTRATION; INTRACAUDAL; PERINEURAL at 07:47

## 2024-12-19 RX ADMIN — HYDROMORPHONE HYDROCHLORIDE 0.4 MG: 0.2 INJECTION, SOLUTION INTRAMUSCULAR; INTRAVENOUS; SUBCUTANEOUS at 14:06

## 2024-12-19 RX ADMIN — FENTANYL CITRATE 25 MCG: 50 INJECTION INTRAMUSCULAR; INTRAVENOUS at 13:27

## 2024-12-19 RX ADMIN — Medication 2 G: at 07:38

## 2024-12-19 RX ADMIN — HYDROMORPHONE HYDROCHLORIDE 0.2 MG: 0.2 INJECTION, SOLUTION INTRAMUSCULAR; INTRAVENOUS; SUBCUTANEOUS at 14:50

## 2024-12-19 RX ADMIN — FENTANYL CITRATE 25 MCG: 50 INJECTION INTRAMUSCULAR; INTRAVENOUS at 13:16

## 2024-12-19 RX ADMIN — PANTOPRAZOLE SODIUM 40 MG: 40 TABLET, DELAYED RELEASE ORAL at 20:52

## 2024-12-19 RX ADMIN — LOSARTAN POTASSIUM 25 MG: 25 TABLET, FILM COATED ORAL at 17:07

## 2024-12-19 RX ADMIN — SODIUM CHLORIDE, POTASSIUM CHLORIDE, SODIUM LACTATE AND CALCIUM CHLORIDE: 600; 310; 30; 20 INJECTION, SOLUTION INTRAVENOUS at 14:07

## 2024-12-19 RX ADMIN — SODIUM CHLORIDE: 9 INJECTION, SOLUTION INTRAVENOUS at 17:08

## 2024-12-19 RX ADMIN — ROCURONIUM BROMIDE 20 MG: 10 INJECTION, SOLUTION INTRAVENOUS at 08:48

## 2024-12-19 RX ADMIN — SENNOSIDES AND DOCUSATE SODIUM 1 TABLET: 50; 8.6 TABLET ORAL at 20:52

## 2024-12-19 RX ADMIN — HYDROMORPHONE HYDROCHLORIDE 0.4 MG: 0.2 INJECTION, SOLUTION INTRAMUSCULAR; INTRAVENOUS; SUBCUTANEOUS at 14:25

## 2024-12-19 RX ADMIN — LORATADINE 10 MG: 10 TABLET ORAL at 17:07

## 2024-12-19 RX ADMIN — Medication 2 G: at 11:20

## 2024-12-19 RX ADMIN — HYDROMORPHONE HYDROCHLORIDE 1 MG: 1 INJECTION, SOLUTION INTRAMUSCULAR; INTRAVENOUS; SUBCUTANEOUS at 08:26

## 2024-12-19 RX ADMIN — HYDROMORPHONE HYDROCHLORIDE 0.5 MG: 1 INJECTION, SOLUTION INTRAMUSCULAR; INTRAVENOUS; SUBCUTANEOUS at 10:48

## 2024-12-19 RX ADMIN — GABAPENTIN 300 MG: 300 CAPSULE ORAL at 07:24

## 2024-12-19 RX ADMIN — SODIUM CHLORIDE, POTASSIUM CHLORIDE, SODIUM LACTATE AND CALCIUM CHLORIDE: 600; 310; 30; 20 INJECTION, SOLUTION INTRAVENOUS at 07:02

## 2024-12-19 RX ADMIN — ACETAMINOPHEN 975 MG: 325 TABLET, FILM COATED ORAL at 17:07

## 2024-12-19 RX ADMIN — GABAPENTIN 100 MG: 100 CAPSULE ORAL at 20:52

## 2024-12-19 RX ADMIN — FENTANYL CITRATE 100 MCG: 50 INJECTION INTRAMUSCULAR; INTRAVENOUS at 07:47

## 2024-12-19 RX ADMIN — OXYCODONE 10 MG: 5 TABLET ORAL at 17:07

## 2024-12-19 RX ADMIN — TRANEXAMIC ACID 1 G: 1 INJECTION, SOLUTION INTRAVENOUS at 07:55

## 2024-12-19 RX ADMIN — MIDAZOLAM 2 MG: 1 INJECTION INTRAMUSCULAR; INTRAVENOUS at 07:38

## 2024-12-19 RX ADMIN — DEXAMETHASONE SODIUM PHOSPHATE 10 MG: 4 INJECTION, SOLUTION INTRA-ARTICULAR; INTRALESIONAL; INTRAMUSCULAR; INTRAVENOUS; SOFT TISSUE at 08:18

## 2024-12-19 RX ADMIN — PHENYLEPHRINE HYDROCHLORIDE 200 MCG: 10 INJECTION INTRAVENOUS at 11:07

## 2024-12-19 RX ADMIN — LOTEPREDNOL ETABONATE 1 DROP: 5 SUSPENSION/ DROPS OPHTHALMIC at 20:53

## 2024-12-19 RX ADMIN — HYDROMORPHONE HYDROCHLORIDE 0.5 MG: 1 INJECTION, SOLUTION INTRAMUSCULAR; INTRAVENOUS; SUBCUTANEOUS at 10:37

## 2024-12-19 RX ADMIN — SODIUM CHLORIDE, POTASSIUM CHLORIDE, SODIUM LACTATE AND CALCIUM CHLORIDE: 600; 310; 30; 20 INJECTION, SOLUTION INTRAVENOUS at 10:05

## 2024-12-19 RX ADMIN — DEXMEDETOMIDINE HYDROCHLORIDE 12 MCG: 100 INJECTION, SOLUTION INTRAVENOUS at 08:18

## 2024-12-19 RX ADMIN — ROCURONIUM BROMIDE 50 MG: 10 INJECTION, SOLUTION INTRAVENOUS at 07:47

## 2024-12-19 RX ADMIN — HYDROMORPHONE HYDROCHLORIDE 0.4 MG: 0.2 INJECTION, SOLUTION INTRAMUSCULAR; INTRAVENOUS; SUBCUTANEOUS at 14:35

## 2024-12-19 RX ADMIN — HYDROMORPHONE HYDROCHLORIDE 0.4 MG: 0.2 INJECTION, SOLUTION INTRAMUSCULAR; INTRAVENOUS; SUBCUTANEOUS at 14:16

## 2024-12-19 RX ADMIN — HYDROMORPHONE HYDROCHLORIDE 0.2 MG: 0.2 INJECTION, SOLUTION INTRAMUSCULAR; INTRAVENOUS; SUBCUTANEOUS at 14:43

## 2024-12-19 RX ADMIN — CEFAZOLIN 1 G: 1 INJECTION, POWDER, FOR SOLUTION INTRAMUSCULAR; INTRAVENOUS at 18:55

## 2024-12-19 RX ADMIN — PROPOFOL 200 MG: 10 INJECTION, EMULSION INTRAVENOUS at 07:47

## 2024-12-19 RX ADMIN — INSULIN ASPART 1 UNITS: 100 INJECTION, SOLUTION INTRAVENOUS; SUBCUTANEOUS at 17:08

## 2024-12-19 RX ADMIN — DEXMEDETOMIDINE HYDROCHLORIDE 8 MCG: 100 INJECTION, SOLUTION INTRAVENOUS at 10:30

## 2024-12-19 RX ADMIN — ONDANSETRON 4 MG: 2 INJECTION INTRAMUSCULAR; INTRAVENOUS at 11:09

## 2024-12-19 RX ADMIN — Medication 200 MG: at 11:20

## 2024-12-19 ASSESSMENT — ACTIVITIES OF DAILY LIVING (ADL)
ADLS_ACUITY_SCORE: 34
ADLS_ACUITY_SCORE: 32
ADLS_ACUITY_SCORE: 34
ADLS_ACUITY_SCORE: 34
ADLS_ACUITY_SCORE: 32
ADLS_ACUITY_SCORE: 34
ADLS_ACUITY_SCORE: 34
ADLS_ACUITY_SCORE: 32
ADLS_ACUITY_SCORE: 32
ADLS_ACUITY_SCORE: 34
ADLS_ACUITY_SCORE: 41
ADLS_ACUITY_SCORE: 34
ADLS_ACUITY_SCORE: 32
ADLS_ACUITY_SCORE: 41

## 2024-12-19 ASSESSMENT — ENCOUNTER SYMPTOMS: DYSRHYTHMIAS: 0

## 2024-12-19 NOTE — INTERVAL H&P NOTE
"I have reviewed the surgical (or preoperative) H&P that is linked to this encounter, and examined the patient. There are no significant changes    Clinical Conditions Present on Arrival:  Clinically Significant Risk Factors Present on Admission                  # Drug Induced Platelet Defect: home medication list includes an antiplatelet medication      # Overweight: Estimated body mass index is 25.85 kg/m  as calculated from the following:    Height as of this encounter: 1.499 m (4' 11\").    Weight as of this encounter: 58.1 kg (128 lb).       "

## 2024-12-19 NOTE — ANESTHESIA PREPROCEDURE EVALUATION
Anesthesia Pre-Procedure Evaluation    Patient: Breana Boudreaux   MRN: [id number] : 1960        Procedure : Procedure(s):  RIGHT LUMBAR 4-5 AND LUMBAR 5 - SACRAL 1 TRANSFORAMINAL LUMBAR INTERBODY FUSION WITH BILATERAL LUMBAR 4-5 LAMINECTOMY USING STEALTH NAVIGATION          Past Medical History:   Diagnosis Date    Antiplatelet or antithrombotic long-term use     Diabetes (H)     Gastroesophageal reflux disease     Heart attack (H)     Hypertension     Renal disease     Stented coronary artery       Past Surgical History:   Procedure Laterality Date    CARDIAC SURGERY      IR CVC NON TUNNEL LINE REMOVAL  2021      Allergies   Allergen Reactions    Ace Inhibitors Cough    Empagliflozin Other (See Comments)     Causes UTI for patient    Statins       Social History     Tobacco Use    Smoking status: Never    Smokeless tobacco: Never   Substance Use Topics    Alcohol use: Never      Wt Readings from Last 1 Encounters:   21 56 kg (123 lb 6.4 oz)        Anesthesia Evaluation        No history of anesthetic complications       ROS/MED HX  ENT/Pulmonary:  - neg pulmonary ROS     Neurologic:  - neg neurologic ROS     Cardiovascular: Comment: Echo ():  1.  Normal left ventricular chamber size.  Calculated left ventricular ejection fraction  (modified Will technique) is 70 %.  2.  Ventricular septal thickening (1.4 cm).  Mildly increased left ventricular wall thickness.  3.  Normal right ventricular chamber size.  Normal right ventricular systolic function.  4.  Mild ascending aorta dilatation (4 cm, 2.7 cm/m ).  Estimated EF: 70%     (+)  hypertension- -  CAD - past MI - stent-   Taking blood thinners                                (-) pacemaker, arrhythmias, valvular problems/murmurs, pacemaker and ICD   METS/Exercise Tolerance: >4 METS    Hematologic:  - neg hematologic  ROS     Musculoskeletal:  - neg musculoskeletal ROS     GI/Hepatic:  - neg GI/hepatic ROS   (+) GERD,                  "  Renal/Genitourinary:  - neg Renal ROS   (+) renal disease, type: CRI,            Endo:  - neg endo ROS   (+)  type II DM,                 (-) Type I DM and thyroid disease   Psychiatric/Substance Use:  - neg psychiatric ROS     Infectious Disease:  - neg infectious disease ROS     Malignancy:  - neg malignancy ROS     Other:  - neg other ROS          Physical Exam    Airway  airway exam normal      Mallampati: II   TM distance: > 3 FB   Neck ROM: full   Mouth opening: > 3 cm    Respiratory Devices and Support         Dental       (+) Modest Abnormalities - crowns, retainers, 1 or 2 missing teeth      Cardiovascular   cardiovascular exam normal          Pulmonary   pulmonary exam normal                OUTSIDE LABS:  CBC:   Lab Results   Component Value Date    WBC 8.6 04/07/2021    WBC 8.0 04/05/2021    HGB 8.4 (L) 04/07/2021    HGB 7.9 (L) 04/05/2021    HCT 26.7 (L) 04/07/2021    HCT 25.1 (L) 04/05/2021     04/07/2021     04/05/2021     BMP:   Lab Results   Component Value Date     04/07/2021     04/05/2021    POTASSIUM 3.3 (L) 04/07/2021    POTASSIUM 3.1 (L) 04/05/2021    CHLORIDE 110 (H) 04/07/2021    CHLORIDE 107 04/05/2021    CO2 24 04/07/2021    CO2 25 04/05/2021    BUN 15 04/07/2021    BUN 15 04/05/2021    CR 2.54 (H) 04/07/2021    CR 2.58 (H) 04/05/2021    GLC 97 04/07/2021    GLC 90 04/05/2021     COAGS: No results found for: \"PTT\", \"INR\", \"FIBR\"  POC:   Lab Results   Component Value Date     (H) 04/08/2021     HEPATIC:   Lab Results   Component Value Date    ALBUMIN 2.5 (L) 04/07/2021    PROTTOTAL 7.4 04/07/2021    ALT 33 04/07/2021    AST 78 (H) 04/07/2021    ALKPHOS 114 04/07/2021    BILITOTAL 0.4 04/07/2021     OTHER:   Lab Results   Component Value Date    LACT 1.0 03/30/2021    A1C 5.5 03/29/2021    LAURA 8.2 (L) 04/07/2021    PHOS 4.5 03/23/2021    MAG 1.9 04/07/2021       Anesthesia Plan    ASA Status:  3    NPO Status:  NPO Appropriate    Anesthesia Type: General.    "  - Airway: ETT   Induction: Intravenous.   Maintenance: Balanced.        Consents    Anesthesia Plan(s) and associated risks, benefits, and realistic alternatives discussed. Questions answered and patient/representative(s) expressed understanding.     - Discussed:     - Discussed with:  Patient            Postoperative Care    Pain management: IV analgesics, Oral pain medications.   PONV prophylaxis: Ondansetron (or other 5HT-3), Dexamethasone or Solumedrol     Comments:    Other Comments: Standard General Induction - ETT, lidocaine / fentanyl / propofol / rocuronium for induction. Background propofol with gas. Decadron / Zofran antiemetics. Fentanyl / Dilaudid / Precedex as needed.            Karlos Blair MD    I have reviewed the pertinent notes and labs in the chart from the past 30 days and (re)examined the patient.  Any updates or changes from those notes are reflected in this note.             # Drug Induced Platelet Defect: home medication list includes an antiplatelet medication   # Hypertension: Home medication list includes antihypertensive(s)

## 2024-12-19 NOTE — ANESTHESIA POSTPROCEDURE EVALUATION
Patient: Breana Boudreaux    Procedure: Procedure(s):  RIGHT LUMBAR 4-5 AND LUMBAR 5 - SACRAL 1 TRANSFORAMINAL LUMBAR INTERBODY FUSION WITH BILATERAL LUMBAR 4-5 LAMINECTOMY USING STEALTH NAVIGATION       Anesthesia Type:  General    Note:     Postop Pain Control: Uneventful            Sign Out: Well controlled pain   PONV: No   Neuro/Psych: Uneventful            Sign Out: Acceptable/Baseline neuro status   Airway/Respiratory: Uneventful            Sign Out: Acceptable/Baseline resp. status   CV/Hemodynamics: Uneventful            Sign Out: Acceptable CV status; No obvious hypovolemia; No obvious fluid overload   Other NRE:    DID A NON-ROUTINE EVENT OCCUR? No           Last vitals:  Vitals Value Taken Time   /92 12/19/24 1510   Temp 36.8  C (98.2  F) 12/19/24 1435   Pulse 91 12/19/24 1512   Resp 19 12/19/24 1512   SpO2 100 % 12/19/24 1512   Vitals shown include unfiled device data.    Electronically Signed By: Karlos Blair MD  December 19, 2024  5:09 PM

## 2024-12-19 NOTE — ANESTHESIA PROCEDURE NOTES
Airway       Patient location during procedure: OR       Procedure Start/Stop Times: 12/19/2024 7:51 AM  Staff -        Anesthesiologist:  Karlos Blair MD       CRNA: Jenny Hodge APRN CRNA       Performed By: CRNA  Consent for Airway        Urgency: elective  Indications and Patient Condition       Indications for airway management: jyoti-procedural and airway protection         Mask difficulty assessment: 2 - vent by mask + OA or adjuvant +/- NMBA    Final Airway Details       Final airway type: endotracheal airway       Successful airway: ETT - single  Endotracheal Airway Details        ETT size (mm): 7.0       Cuffed: yes       Successful intubation technique: video laryngoscopy       VL Blade Size: Glidescope 3       Grade View of Cords: 1       Adjucts: stylet       Position: Right       Measured from: lips       Secured at (cm): 20       Bite block used: None    Post intubation assessment        Placement verified by: capnometry and equal breath sounds        Number of attempts at approach: 1       Secured with: tape       Ease of procedure: easy       Dentition: Intact and Unchanged    Medication(s) Administered   Medication Administration Time: 12/19/2024 7:51 AM

## 2024-12-19 NOTE — CONSULTS
INTERNAL MEDICINE CONSULT    Physician requesting consult: Dr. Verma  Reason for consult: Hypertension diabetes CAD    Assessment and Plan:  Right L4-L5, L5-S1 fusion for severe degenerative disc disease, foraminal stenosis  Continue PT OT, pain control, DVT prophylaxis per spine surgery  Monitor hemoglobin  Has drain in place    History of CAD status post stents  Last stent was 2 years ago per family  Resume aspirin, Plavix when spine surgery agrees    Diabetes mellitus type 2  Continue reduced dose of home Lantus, prandial insulin  Insulin sliding scale as needed  Holding semaglutide    Hypertension  Continue home losartan with holding parameters    Iron deficiency anemia  Monitor hemoglobin  On no home iron supplements    CKD stage III  Avoid nephrotoxic medications  Preop creatinine is 1.1    GERD  On omeprazole, Pepcid      HPI:     Breana Boudreaux is a 64 year old female with past history significant for hypertension, diabetes mellitus type 2, CAD status post stents dyslipidemia admitted postoperatively after she underwent lumbar, sacral fusion tolerated procedure well.  Estimated blood loss 111 mL. Currently denies any complaints such as nausea vomiting shortness of breath chest pain abdominal pain. Denies any previous history of blood clots,.  Preop history and physical reviewed.     Medical History  [unfilled]  Past Medical History:   Diagnosis Date    Antiplatelet or antithrombotic long-term use     Diabetes (H)     Gastroesophageal reflux disease     Heart attack (H)     Hypertension     Renal disease     Stented coronary artery       Patient Active Problem List    Diagnosis Date Noted    Status post lumbar surgery 12/19/2024     Priority: Medium    Lactic acidemia 03/28/2021     Priority: Medium    Debility 03/26/2021     Priority: Medium        Surgical History  She  has a past surgical history that includes Cardiac surgery and IR CVC Non Tunnel Line Removal (4/7/2021).     Past Surgical  History:   Procedure Laterality Date    CARDIAC SURGERY      IR CVC NON TUNNEL LINE REMOVAL  4/7/2021       Allergies  Allergies   Allergen Reactions    Ace Inhibitors Cough    Empagliflozin Other (See Comments)     Causes UTI for patient    Statins        Prior to Admission Medications   Medications Prior to Admission   Medication Sig Dispense Refill Last Dose/Taking    acetaminophen (TYLENOL) 325 MG tablet Take 2 tablets (650 mg) by mouth every 6 hours as needed for mild pain   More than a month    aspirin (ASA) 81 MG chewable tablet Take 81 mg by mouth daily   More than a month    calcium polycarbophil (FIBERCON) 625 MG tablet Take 2 tablets by mouth daily   12/18/2024    clopidogrel (PLAVIX) 75 MG tablet Take 75 mg by mouth daily.   12/5/2024    diphenhydrAMINE (BENADRYL) 25 MG capsule Take 25-50 mg by mouth every 4 hours as needed for itching.   More than a month    famotidine (PEPCID) 20 MG tablet Take 1 tablet (20 mg) by mouth 2 times daily as needed (upset stomach, reflux symptoms.) 30 tablet 0 12/12/2024    ferrous sulfate (FEROSUL) 325 (65 Fe) MG tablet Take 1 tablet (325 mg) by mouth daily (with breakfast) 30 tablet 0 12/12/2024    HYDROcodone-acetaminophen (LORCET) 5-325 MG tablet Take 1 tablet by mouth every 6 hours as needed for breakthrough pain.   12/18/2024 Evening    insulin glargine (LANTUS SOLOSTAR) 100 UNIT/ML pen Inject 35 Units subcutaneously every morning.   12/12/2024    insulin lispro (HUMALOG KWIKPEN) 100 UNIT/ML (1 unit dial) KWIKPEN Inject 4 Units subcutaneously 2 times daily (before meals).   12/12/2024    levalbuterol (XOPENEX HFA) 45 MCG/ACT inhaler Inhale 1-2 puffs into the lungs every 4 hours as needed for shortness of breath or wheezing.   12/12/2024    loratadine (CLARITIN) 10 MG tablet Take 1 tablet by mouth daily.   12/12/2024    losartan (COZAAR) 25 MG tablet Take 1 tablet by mouth daily.   12/12/2024    loteprednol (LOTEMAX) 0.5 % ophthalmic suspension Place 1 drop into both  "eyes 4 times daily.   12/18/2024 Morning    multivitamin RENAL (RENAVITE RX/NEPHROVITE) 1 MG tablet Take 1 tablet by mouth daily 30 tablet 0 12/12/2024    omeprazole (PRILOSEC) 40 MG DR capsule Take 1 capsule (40 mg) by mouth 2 times daily 60 capsule 0 12/18/2024 Morning    potassium chloride ER (KLOR-CON M) 20 MEQ CR tablet Take 1 tablet (20 mEq) by mouth 2 times daily 60 tablet 0 12/18/2024    prochlorperazine (COMPAZINE) 5 MG tablet Take 1 tablet (5 mg) by mouth every 8 hours as needed for nausea or vomiting 10 tablet 0 More than a month    Semaglutide, 2 MG/DOSE, (OZEMPIC, 2 MG/DOSE,) 8 MG/3ML pen Inject 2 mg subcutaneously once a week.   12/8/2024    polyethylene glycol (MIRALAX) 17 g packet Take 17 g by mouth daily as needed for constipation   12/12/2024       Social History  Reviewed, and she  reports that she has never smoked. She has never used smokeless tobacco. She reports that she does not drink alcohol and does not use drugs.  Social History     Tobacco Use    Smoking status: Never    Smokeless tobacco: Never   Substance Use Topics    Alcohol use: Never       Family History  Reviewed, and @[facility]@    Review Of Systems  As per admission HPI, all others reviewed and negative.     Physical Exam:  BP (!) 179/100 (BP Location: Left arm)   Pulse 89   Temp 97.2  F (36.2  C) (Axillary)   Resp 10   Ht 1.499 m (4' 11\")   Wt 58.1 kg (128 lb)   SpO2 98%   BMI 25.85 kg/m    General Appearance:  Awake Alert, orientedx3, not in any apparent distress   Head:  Normocephalic, without obvious abnormality   Eyes:  PERRL, conjunctiva/corneas clear   Throat: Oral mucosa moist   Neck: Supple,  no JVD   Lungs:   Clear to auscultation bilaterally, respirations unlabored   Chest Wall:  No tenderness   Heart:  Regular rate and rhythm, S1, S2 normal,no murmur   Abdomen:   Soft, non-tender, bowel sounds present,  no masses, no organomegaly   Extremities: No edemA   Skin: Status post back surgery, drain in place. "   Neurologic: Alert and oriented X 3, exam on operated extremity defer to surgery     Results:  Results for orders placed or performed during the hospital encounter of 12/19/24   XR Surgery OARM     Status: None    Narrative    This exam was marked as non-reportable because it will not be read by a   radiologist or a [place] non-radiologist provider.         Extra Tube ([place] Draw)     Status: None    Narrative    The following orders were created for panel order Extra Tube ([place] Draw).  Procedure                               Abnormality         Status                     ---------                               -----------         ------                     Extra Green Top (Lithium...[[id number]]                      Final result               Extra Purple Top Tube[[id number]]                            Final result                 Please view results for these tests on the individual orders.   Extra Green Top (Lithium Heparin) Tube     Status: None   Result Value Ref Range    Hold Specimen JIC    Extra Purple Top Tube     Status: None   Result Value Ref Range    Hold Specimen JIC    Glucose by meter     Status: Normal   Result Value Ref Range    GLUCOSE BY METER POCT 95 70 - 99 mg/dL   Glucose by meter     Status: Abnormal   Result Value Ref Range    GLUCOSE BY METER POCT 168 (H) 70 - 99 mg/dL       Imaging:   XR Surgery OARM    Result Date: 12/19/2024  This exam was marked as non-reportable because it will not be read by a radiologist or a [place] non-radiologist provider.         Yesy Diaz M.D  [facility] Service  Internal Medicine    12/19/2024  4:21 PM

## 2024-12-19 NOTE — ANESTHESIA CARE TRANSFER NOTE
Patient: Breana Boudreaux    Procedure: Procedure(s):  RIGHT LUMBAR 4-5 AND LUMBAR 5 - SACRAL 1 TRANSFORAMINAL LUMBAR INTERBODY FUSION WITH BILATERAL LUMBAR 4-5 LAMINECTOMY USING STEALTH NAVIGATION       Diagnosis: Spinal stenosis, lumbar [M48.061]  Diagnosis Additional Information: No value filed.    Anesthesia Type:   General     Note:    Oropharynx: oropharynx clear of all foreign objects  Level of Consciousness: drowsy  Oxygen Supplementation: face mask  Level of Supplemental Oxygen (L/min / FiO2): 8  Independent Airway: airway patency satisfactory and stable  Dentition: dentition unchanged  Vital Signs Stable: post-procedure vital signs reviewed and stable  Report to RN Given: handoff report given  Patient transferred to: PACU    Handoff Report: Identifed the Patient, Identified the Reponsible Provider, Reviewed the pertinent medical history, Discussed the surgical course, Reviewed Intra-OP anesthesia mangement and issues during anesthesia, Set expectations for post-procedure period and Allowed opportunity for questions and acknowledgement of understanding      Vitals:  Vitals Value Taken Time   /69 12/19/24 1201   Temp 36.9  C (98.4  F) 12/19/24 1200   Pulse 95 12/19/24 1205   Resp 16 12/19/24 1205   SpO2 99 % 12/19/24 1205   Vitals shown include unfiled device data.    Electronically Signed By: CARTER REAGAN CRNA  December 19, 2024  12:07 PM

## 2024-12-19 NOTE — OP NOTE
DATE OF SERVICE: 12/19/2024     PREOPERATIVE DIAGNOSES:   1.  L4-5 severe degenerative disc disease  2.  L5-S1 severe degenerative disc disease  3.  Right foraminal stenosis, L4 and L5 nerve roots  4.  Degenerative spondylolisthesis at L4-5   5.  Central stenosis at L4-5      POSTOPERATIVE DIAGNOSES:   1.  L4-5 severe degenerative disc disease  2.  L5-S1 severe degenerative disc disease  3.  Right foraminal stenosis, L4 and L5 nerve roots  4.  Degenerative spondylolisthesis at L4-5   5.  Central stenosis at L4-5      PROCEDURE:   1. Right-sided transforaminal/transfacet decompression of the exiting L5 and traversing S1 nerve roots.   2. Transforaminal lumbar interbody fusion L5-S1 with autograft bone, Medtronic Catalyft interbody 27 x 7.   3. Right-sided transforaminal/transfacet decompression of the exiting L4 and traversing L5 nerve roots.   4. Transforaminal lumbar interbody fusion L4-5 with autograft bone, Medtronic Catalyft interbody 27 x 9.  5. Posterior lateral fusion on the left at L4-5 and L5-S1.   6. Segmental pedicle screw fixation in the pedicles of L4, L5, and S1 bilaterally  7.  L4-5 laminectomy  8. O arm with intra operative navigation  9. More complicated secondary to previous surgery     Medtronic [place] navigated pedicle screws systems.      SURGEON: Dr. Demarcus Verma.      ASSISTANT: Coty Garay PA-C, who was needed for patient positioning, soft tissue retraction, patient safety and assistance with closure of the wound.  He was vital in the protection of the nerve roots as well as the dura.  This could only be performed by a surgical PA trained in spine     ESTIMATED BLOOD LOSS: 111 mL     DRAINS: Hemovac      COMPLICATIONS: None perceived.      SPECIMENS SENT: None.      HISTORY OF PRESENT ILLNESS AND INDICATIONS FOR PROCEDURE:   This is a pleasant 64 year old female that presented with severe bilateral leg pain and buttock pain.  MRI demonstrated L4-5 and L5-S1 severe degenerative disc  disease and spondylolisthesis with severe foraminal stenosis at each level.  She underwent non-operative treatment with injections as well as therapy with no improvement long term.  We discussed because of this that she was a candidate for surgery.  I discussed that a fusion would give her more reliable results given her severe collapse.  She wished to proceed with the fusion.  We discussed the risks and benefits which include but are not limited to bleeding, infection, damage to the nerve or dura, failure of procedure to provide pain relief, foot drop, iatrogenic instability, pseudoarthrosis, need for additional procedures, and risks associated with anesthesia.  The intention of the surgery is to treat her leg pain and will not reliably treat any back pain. She was completely clear on this.  Following this, she would like to proceed with the fusion.       DESCRIPTION OF PROCEDURE     Therefore, the patient was brought to the operating room. She was intubated via general endotracheal anesthetic and placed on a Shamar table with a Dion frame, care taken to pad all of his bony prominences. Pause for the Cause was performed correctly identifying the patient, procedure, proposed plan and radiographs and all were in agreement. We then dissected down from the L3 to the S1 hemilamina bilaterally. We dissected over the facet joints at L4-5 and L5-S1 bilaterally.  In addition to this, the transverse process was visualized at the L4, L5, and S1 level.  O arm was brought in and a tracer was placed on the S1 spinous process.  An intraoperative CT scan was taken.       We then turned our attention to placing the pedicle screws, first using a Midas Jordy bur, then a pedicle probe, then a Stanton probe to palpate the cannulated pedicle. We then tapped the holes to 5.5 mm at L4, L5, and S1.  We then used the Stanton probe again to palpate all 4 quadrants of our cannulated pedicle. We then placed 6.5x50 screws bilaterally at L4 and  6.5x45 screws bilaterally at L5 and 6.5x40 screws at S1.   A subsequent O arm spin was performed and demonstrated the screws in good position.     We then turned our attention to the decompression and interbody.  A laminotomy was made.  The right L4 pars was scored allowing for the removal of the descending articular process of L4 along with the ascending articular process of L5. This gave us our transforaminal/transfacet decompression of the exiting L4, traversing L5 nerve root.      We followed that L4 nerve root all the way out. There was clearly no continued neural compression.  At the disk space level, we performed a box annulotomy at L4-5, used a combination of ODC curettes, pituitary rongeurs and Kerrison rongeurs to remove the disk material. We took great care not to dig into the endplates. When all of the disk material that we could remove was taken out, we gently scored the endplates. We then placed the interbody graft, a 27 mm length and 9 mm height, Medtronic Catalyft cage. This had good fit and fill.  Injectable [place] was then placed through the cage.     A laminotomy was made then at L5-S1 on the right.  The right L5 pars was scored allowing for the removal of the descending articular process of L5 along with the ascending articular process of S1. This gave us our transforaminal/transfacet decompression of the exiting L5, traversing S1 nerve root.      We followed that L5 nerve root all the way out. There was clearly no continued neural compression.  At the disk space level, we performed a box annulotomy at L5-S1, used a combination of ODC curettes, pituitary rongeurs and Kerrison rongeurs to remove the disk material. We took great care not to dig into the endplates. When all of the disk material that we could remove was taken out, we gently scored the endplates . We then placed the interbody graft,  a 27 mm length and 7 mm height, Medtronic Catalyft cage. This had good fit and fill.  Injectable  [place] was then placed through the cage.     An L4-5 laminectomy was performed with all of the ligamentum removed from the lateral recess completely freeing the traversing nerve root.  There was no residual compression.      We then decorticated the lamina and facet joints on the left at L4-5 and L5-S1 and placed additional autograft in the defect.  This was mixed with Magnetos to assist in posterolateral fusion.       We then placed our connectors and rods and tightened to the 's specifications. We irrigated the wound copiously. Vancomycin was placed over the hardware.  Hemovac drain was then placed. The fascia was closed with #1 Vicryl, subcutaneous tissue with 2-0 Vicryl, skin with a 3-0 monocryl.  A sterile dressing was applied. The patient tolerated the procedure well. There were no apparent complications.      She will be weightbearing as tolerated bilateral lower extremities with strict instructions to avoid bending, lifting and twisting over the next 6 weeks. She may have a corset type brace for comfort and have early mobilization and IZAIAH stockings for her DVT prophylaxis. She will have 2 grams of Ancef IV q.8 hours x2 doses for antibiotics or until drain is removed. Return to see me in 2 weeks, sooner if there are any problems, questions or concerns.     Hardware used:  [employer] Solera screws and rods with end caps  Medtronic Catalyft cage

## 2024-12-19 NOTE — PHARMACY-ADMISSION MEDICATION HISTORY
Pharmacist completed medication history with the patient while in the [facility].  Prior to admission (PTA) med list completed and updated in the electronic medical record (EMR).  Pharmacy Note - Admission Medication History  Pertinent Provider Information: none   ______________________________________________________________________  Prior To Admission (PTA) med list completed and updated in EMR.     PTA Med List   Medication Sig Last Dose/Taking    acetaminophen (TYLENOL) 325 MG tablet Take 2 tablets (650 mg) by mouth every 6 hours as needed for mild pain More than a month    aspirin (ASA) 81 MG chewable tablet Take 81 mg by mouth daily More than a month    calcium polycarbophil (FIBERCON) 625 MG tablet Take 2 tablets by mouth daily 12/18/2024    clopidogrel (PLAVIX) 75 MG tablet Take 75 mg by mouth daily. 12/5/2024    diphenhydrAMINE (BENADRYL) 25 MG capsule Take 25-50 mg by mouth every 4 hours as needed for itching. More than a month    famotidine (PEPCID) 20 MG tablet Take 1 tablet (20 mg) by mouth 2 times daily as needed (upset stomach, reflux symptoms.) 12/12/2024    ferrous sulfate (FEROSUL) 325 (65 Fe) MG tablet Take 1 tablet (325 mg) by mouth daily (with breakfast) 12/12/2024    HYDROcodone-acetaminophen (LORCET) 5-325 MG tablet Take 1 tablet by mouth every 6 hours as needed for breakthrough pain. 12/18/2024 Evening    insulin glargine (LANTUS SOLOSTAR) 100 UNIT/ML pen Inject 35 Units subcutaneously every morning. 12/12/2024    insulin lispro (HUMALOG KWIKPEN) 100 UNIT/ML (1 unit dial) KWIKPEN Inject 4 Units subcutaneously 2 times daily (before meals). 12/12/2024    levalbuterol (XOPENEX HFA) 45 MCG/ACT inhaler Inhale 1-2 puffs into the lungs every 4 hours as needed for shortness of breath or wheezing. 12/12/2024    loratadine (CLARITIN) 10 MG tablet Take 1 tablet by mouth daily. 12/12/2024    losartan (COZAAR) 25 MG tablet Take 1 tablet by mouth daily. 12/12/2024    loteprednol (LOTEMAX) 0.5 % ophthalmic  suspension Place 1 drop into both eyes 4 times daily. 12/18/2024 Morning    multivitamin RENAL (RENAVITE RX/NEPHROVITE) 1 MG tablet Take 1 tablet by mouth daily 12/12/2024    omeprazole (PRILOSEC) 40 MG DR capsule Take 1 capsule (40 mg) by mouth 2 times daily 12/18/2024 Morning    potassium chloride ER (KLOR-CON M) 20 MEQ CR tablet Take 1 tablet (20 mEq) by mouth 2 times daily 12/18/2024    prochlorperazine (COMPAZINE) 5 MG tablet Take 1 tablet (5 mg) by mouth every 8 hours as needed for nausea or vomiting More than a month    Semaglutide, 2 MG/DOSE, (OZEMPIC, 2 MG/DOSE,) 8 MG/3ML pen Inject 2 mg subcutaneously once a week. 12/8/2024         Information source(s): Patient and CareEverywhere/SureScripts  Patient was asked about OTC/herbal products specifically.  PTA med list reflects this.  Based on the pharmacist s assessment, the PTA med list information appears reliable  Allergies were reviewed, assessed, and updated with the patient.    Thank you for the opportunity to participate in the care of this patient.    The patient was asked about OTC/herbal products specifically and the PTA med list reflects the patient's response.    Allergies were reviewed and assessed with the patient, responses were updated in the EMR.    Thank you for the opportunity to participate in the care of this patient.    Kamron Garcia RPH 12/19/2024 7:35 AM

## 2024-12-20 ENCOUNTER — APPOINTMENT (OUTPATIENT)
Dept: OCCUPATIONAL THERAPY | Facility: CLINIC | Age: 64
DRG: 427 | End: 2024-12-20
Payer: COMMERCIAL

## 2024-12-20 ENCOUNTER — APPOINTMENT (OUTPATIENT)
Dept: PHYSICAL THERAPY | Facility: CLINIC | Age: 64
DRG: 427 | End: 2024-12-20
Payer: COMMERCIAL

## 2024-12-20 VITALS
HEIGHT: 59 IN | BODY MASS INDEX: 25.8 KG/M2 | HEART RATE: 92 BPM | SYSTOLIC BLOOD PRESSURE: 170 MMHG | TEMPERATURE: 98.2 F | WEIGHT: 128 LBS | RESPIRATION RATE: 18 BRPM | DIASTOLIC BLOOD PRESSURE: 78 MMHG | OXYGEN SATURATION: 100 %

## 2024-12-20 LAB
GLUCOSE BLDC GLUCOMTR-MCNC: 119 MG/DL (ref 70–99)
GLUCOSE BLDC GLUCOMTR-MCNC: 127 MG/DL (ref 70–99)
GLUCOSE BLDC GLUCOMTR-MCNC: 132 MG/DL (ref 70–99)
GLUCOSE BLDC GLUCOMTR-MCNC: 154 MG/DL (ref 70–99)
GLUCOSE BLDC GLUCOMTR-MCNC: 168 MG/DL (ref 70–99)
HGB BLD-MCNC: 9.8 G/DL (ref 11.7–15.7)

## 2024-12-20 PROCEDURE — 97116 GAIT TRAINING THERAPY: CPT | Mod: GP

## 2024-12-20 PROCEDURE — 36415 COLL VENOUS BLD VENIPUNCTURE: CPT

## 2024-12-20 PROCEDURE — 258N000003 HC RX IP 258 OP 636

## 2024-12-20 PROCEDURE — 97530 THERAPEUTIC ACTIVITIES: CPT | Mod: GP

## 2024-12-20 PROCEDURE — 97161 PT EVAL LOW COMPLEX 20 MIN: CPT | Mod: GP

## 2024-12-20 PROCEDURE — 250N000013 HC RX MED GY IP 250 OP 250 PS 637

## 2024-12-20 PROCEDURE — 250N000012 HC RX MED GY IP 250 OP 636 PS 637: Performed by: INTERNAL MEDICINE

## 2024-12-20 PROCEDURE — 97166 OT EVAL MOD COMPLEX 45 MIN: CPT | Mod: GO | Performed by: OCCUPATIONAL THERAPIST

## 2024-12-20 PROCEDURE — 250N000011 HC RX IP 250 OP 636

## 2024-12-20 PROCEDURE — 97535 SELF CARE MNGMENT TRAINING: CPT | Mod: GO | Performed by: OCCUPATIONAL THERAPIST

## 2024-12-20 PROCEDURE — 250N000013 HC RX MED GY IP 250 OP 250 PS 637: Performed by: INTERNAL MEDICINE

## 2024-12-20 PROCEDURE — 120N000001 HC R&B MED SURG/OB

## 2024-12-20 PROCEDURE — 85018 HEMOGLOBIN: CPT

## 2024-12-20 PROCEDURE — 99232 SBSQ HOSP IP/OBS MODERATE 35: CPT | Performed by: INTERNAL MEDICINE

## 2024-12-20 RX ORDER — CEFAZOLIN SODIUM 1 G/3ML
1 INJECTION, POWDER, FOR SOLUTION INTRAMUSCULAR; INTRAVENOUS EVERY 8 HOURS
Status: DISCONTINUED | OUTPATIENT
Start: 2024-12-20 | End: 2024-12-21

## 2024-12-20 RX ADMIN — POLYETHYLENE GLYCOL 3350 17 G: 17 POWDER, FOR SOLUTION ORAL at 09:37

## 2024-12-20 RX ADMIN — FERROUS SULFATE TAB 325 MG (65 MG ELEMENTAL FE) 325 MG: 325 (65 FE) TAB at 09:36

## 2024-12-20 RX ADMIN — LORATADINE 10 MG: 10 TABLET ORAL at 09:36

## 2024-12-20 RX ADMIN — SENNOSIDES AND DOCUSATE SODIUM 1 TABLET: 50; 8.6 TABLET ORAL at 09:36

## 2024-12-20 RX ADMIN — SENNOSIDES AND DOCUSATE SODIUM 1 TABLET: 50; 8.6 TABLET ORAL at 21:35

## 2024-12-20 RX ADMIN — THERA TABS 1 TABLET: TAB at 09:36

## 2024-12-20 RX ADMIN — SODIUM CHLORIDE: 9 INJECTION, SOLUTION INTRAVENOUS at 01:25

## 2024-12-20 RX ADMIN — CEFAZOLIN 1 G: 1 INJECTION, POWDER, FOR SOLUTION INTRAMUSCULAR; INTRAVENOUS at 01:38

## 2024-12-20 RX ADMIN — PANTOPRAZOLE SODIUM 40 MG: 40 TABLET, DELAYED RELEASE ORAL at 09:36

## 2024-12-20 RX ADMIN — INSULIN ASPART 1 UNITS: 100 INJECTION, SOLUTION INTRAVENOUS; SUBCUTANEOUS at 17:52

## 2024-12-20 RX ADMIN — CEFAZOLIN 1 G: 1 INJECTION, POWDER, FOR SOLUTION INTRAMUSCULAR; INTRAVENOUS at 16:49

## 2024-12-20 RX ADMIN — OXYCODONE 10 MG: 5 TABLET ORAL at 13:15

## 2024-12-20 RX ADMIN — ACETAMINOPHEN 975 MG: 325 TABLET, FILM COATED ORAL at 09:35

## 2024-12-20 RX ADMIN — OXYCODONE 10 MG: 5 TABLET ORAL at 21:35

## 2024-12-20 RX ADMIN — GABAPENTIN 100 MG: 100 CAPSULE ORAL at 09:36

## 2024-12-20 RX ADMIN — ACETAMINOPHEN 975 MG: 325 TABLET, FILM COATED ORAL at 16:49

## 2024-12-20 RX ADMIN — INSULIN ASPART 1 UNITS: 100 INJECTION, SOLUTION INTRAVENOUS; SUBCUTANEOUS at 06:33

## 2024-12-20 RX ADMIN — OXYCODONE 5 MG: 5 TABLET ORAL at 06:44

## 2024-12-20 RX ADMIN — GABAPENTIN 100 MG: 100 CAPSULE ORAL at 16:49

## 2024-12-20 RX ADMIN — PANTOPRAZOLE SODIUM 40 MG: 40 TABLET, DELAYED RELEASE ORAL at 21:35

## 2024-12-20 RX ADMIN — INSULIN GLARGINE 30 UNITS: 100 INJECTION, SOLUTION SUBCUTANEOUS at 06:33

## 2024-12-20 RX ADMIN — GABAPENTIN 100 MG: 100 CAPSULE ORAL at 21:35

## 2024-12-20 RX ADMIN — LOTEPREDNOL ETABONATE 1 DROP: 5 SUSPENSION/ DROPS OPHTHALMIC at 09:37

## 2024-12-20 RX ADMIN — LOSARTAN POTASSIUM 25 MG: 25 TABLET, FILM COATED ORAL at 09:36

## 2024-12-20 RX ADMIN — ACETAMINOPHEN 975 MG: 325 TABLET, FILM COATED ORAL at 00:05

## 2024-12-20 RX ADMIN — CEFAZOLIN 1 G: 1 INJECTION, POWDER, FOR SOLUTION INTRAMUSCULAR; INTRAVENOUS at 09:37

## 2024-12-20 RX ADMIN — LOTEPREDNOL ETABONATE 1 DROP: 5 SUSPENSION/ DROPS OPHTHALMIC at 21:36

## 2024-12-20 RX ADMIN — LOTEPREDNOL ETABONATE 1 DROP: 5 SUSPENSION/ DROPS OPHTHALMIC at 16:58

## 2024-12-20 ASSESSMENT — ACTIVITIES OF DAILY LIVING (ADL)
ADLS_ACUITY_SCORE: 34
ADLS_ACUITY_SCORE: 32
ADLS_ACUITY_SCORE: 36
ADLS_ACUITY_SCORE: 32
ADLS_ACUITY_SCORE: 32
ADLS_ACUITY_SCORE: 34
ADLS_ACUITY_SCORE: 32
ADLS_ACUITY_SCORE: 36
ADLS_ACUITY_SCORE: 34
PREVIOUS_RESPONSIBILITIES: MEAL PREP;LAUNDRY;HOUSEKEEPING
DEPENDENT_IADLS:: CLEANING;COOKING;LAUNDRY;SHOPPING;MEAL PREPARATION;MEDICATION MANAGEMENT;MONEY MANAGEMENT;TRANSPORTATION
ADLS_ACUITY_SCORE: 34
ADLS_ACUITY_SCORE: 32
ADLS_ACUITY_SCORE: 32
ADLS_ACUITY_SCORE: 34
ADLS_ACUITY_SCORE: 36
ADLS_ACUITY_SCORE: 34
ADLS_ACUITY_SCORE: 32
ADLS_ACUITY_SCORE: 32
ADLS_ACUITY_SCORE: 34
ADLS_ACUITY_SCORE: 34
ADLS_ACUITY_SCORE: 36
ADLS_ACUITY_SCORE: 32

## 2024-12-20 NOTE — PROGRESS NOTES
S: Pt is a 64 yof seen at [facility], room 362, for delivery of a lumbar sacral orthosis (LSO), ordered by Jaz Collazo PA-C.  DX: s/p lumbar surgery  O: 4  11 . 128 lbs.  A: Shaye Epic 627 LSO circumference was adjusted to fit the patient appropriately. Donning/doffing and wear/care instructions were discussed with the patient. 's written instructions were provided. Pt was satisfied with fit and function of device.  P: Pt to follow-up as needed. All questions were answered at this time.  G: Provide hydrostatic compression to stabilize surgical site and relieve lumbar pain.    Electronically signed by Alice Waite CPO, LPO

## 2024-12-20 NOTE — PROVIDER NOTIFICATION
Pt had elevated Bps since surgery. Requested PRN BP medication for /90 or greater.   Paged Dr. Michaud

## 2024-12-20 NOTE — PROGRESS NOTES
S Daily Progress Note    Assessment/Plan:  Right L4-L5, L5-S1 fusion for severe degenerative disc disease, foraminal stenosis  Continue PT OT, pain control, DVT prophylaxis per spine surgery  Monitor hemoglobin  Has drain in place     History of CAD status post stents  Last stent was 2 years ago per family  Resume aspirin, Plavix when spine surgery agrees     Diabetes mellitus type 2  Continue reduced dose of home Lantus, prandial insulin  Insulin sliding scale as needed  Holding semaglutide     Hypertension  Continue home losartan with holding parameters     Acute blood loss anemia  Iron deficiency anemia  Preop hemoglobin 12/5 at 12-9.8 today  Monitor hemoglobin  On home iron supplements     CKD stage III  Avoid nephrotoxic medications  Preop creatinine is 1.1     GERD  On omeprazole, Pepcid    Active Problems:    Status post lumbar surgery     LOS: 1 day     Subjective:  She complains of pain in her back.  No other shortness of breath, chest pain, urinary or bowel complaints.  Not passing gas yet.  Current Facility-Administered Medications   Medication Dose Route Frequency Provider Last Rate Last Admin    acetaminophen (TYLENOL) tablet 975 mg  975 mg Oral Q8H Coty Garay PA-C   975 mg at 12/20/24 0935    ceFAZolin (ANCEF) 1 g vial to attach to  ml bag for ADULT or 50 ml bag for PEDS  1 g Intravenous Q8H Jaz Collazo PA-C   1 g at 12/20/24 0937    ferrous sulfate (FEROSUL) tablet 325 mg  325 mg Oral Daily with breakfast Yesy Diaz MD   325 mg at 12/20/24 0936    gabapentin (NEURONTIN) capsule 100 mg  100 mg Oral TID Coty Garay PA-C   100 mg at 12/20/24 0936    insulin aspart (NovoLOG) injection (RAPID ACTING)  4 Units Subcutaneous BID Yesy Freeman MD   4 Units at 12/20/24 0636    insulin aspart (NovoLOG) injection (RAPID ACTING)  1-7 Units Subcutaneous TID Yesy Freeman MD   1 Units at 12/20/24 0633    insulin aspart (NovoLOG) injection (RAPID ACTING)  1-5 Units  Subcutaneous At Bedtime Yesy Diaz MD        insulin glargine (LANTUS PEN) injection 30 Units  30 Units Subcutaneous QAM Yesy Diaz MD   30 Units at 12/20/24 0633    loratadine (CLARITIN) tablet 10 mg  10 mg Oral Daily Yesy Diaz MD   10 mg at 12/20/24 0936    losartan (COZAAR) tablet 25 mg  25 mg Oral Daily Yesy Diaz MD   25 mg at 12/20/24 0936    loteprednol (LOTEMAX) 0.5 % ophthalmic susp 1 drop  1 drop Both Eyes 4x Daily Yesy Diaz MD   1 drop at 12/20/24 0937    multivitamin, therapeutic (THERA-VIT) tablet 1 tablet  1 tablet Oral Daily Coty Garay PA-C   1 tablet at 12/20/24 0936    pantoprazole (PROTONIX) EC tablet 40 mg  40 mg Oral BID Yesy Diaz MD   40 mg at 12/20/24 0936    polyethylene glycol (MIRALAX) Packet 17 g  17 g Oral Daily Coty Garay PA-C   17 g at 12/20/24 0937    senna-docusate (SENOKOT-S/PERICOLACE) 8.6-50 MG per tablet 1 tablet  1 tablet Oral BID Coty Garay PA-C   1 tablet at 12/20/24 0936       Objective:  Vital signs in last 24 hours:  Temp:  [96.1  F (35.6  C)-98.2  F (36.8  C)] 97.7  F (36.5  C)  Pulse:  [87-99] 89  Resp:  [10-30] 20  BP: (132-185)/() 132/73  SpO2:  [90 %-100 %] 97 %  Weight:   [unfilled]    Intake/Output last 3 shifts:  I/O last 3 completed shifts:  In: 2382.7 [P.O.:240; I.V.:2140.7; IV Piggyback:2]  Out: 4596 [Urine:4425; Drains:60; Blood:111]  Intake/Output this shift:  I/O this shift:  In: 1200 [P.O.:1200]  Out: 1050 [Urine:1050]    Review of Systems:   As per subjective, all others negative.    Physical Exam:    General Appearance:  Alert, cooperative, no distress, appears stated age   Head:  Normocephalic, without obvious abnormality, atraumatic   Eyes:  PERRL,    Neck: Supple   Back:   S/p lumbar surgery   Lungs:   Clear to auscultation bilaterally, respirations unlabored   Chest Wall:  No tenderness or deformity   Heart:  Regular rate and rhythm, S1, S2 normal,no murmur, rub or gallop   Abdomen:   Soft, non tender,  non distended, bowel sounds present, no guarding or rigidity   Extremities: No edema   Skin: Skin color, texture, turgor normal, no rashes or lesions   Neurologic: Alert and oriented X 3, Moves all 4 extremities       Lab Results:  Personally Reviewed.   Recent Results (from the past 24 hours)   Glucose by meter    Collection Time: 12/19/24  5:03 PM   Result Value Ref Range    GLUCOSE BY METER POCT 172 (H) 70 - 99 mg/dL   Glucose by meter    Collection Time: 12/19/24  8:48 PM   Result Value Ref Range    GLUCOSE BY METER POCT 174 (H) 70 - 99 mg/dL   Glucose by meter    Collection Time: 12/20/24  1:27 AM   Result Value Ref Range    GLUCOSE BY METER POCT 132 (H) 70 - 99 mg/dL   Glucose by meter    Collection Time: 12/20/24  6:15 AM   Result Value Ref Range    GLUCOSE BY METER POCT 154 (H) 70 - 99 mg/dL   Hemoglobin    Collection Time: 12/20/24  6:42 AM   Result Value Ref Range    Hemoglobin 9.8 (L) 11.7 - 15.7 g/dL         Yesy Diaz M.D  [facility] Service  Internal Medicine

## 2024-12-20 NOTE — PROGRESS NOTES
12/20/24 0832   Appointment Info   Signing Clinician's Name / Credentials (OT) Digna Song OTR/L   Rehab Comments (OT) OT EVAL   Quick Adds   Quick Adds [facility] Auth & Certification      Language  interpreted   Living Environment   People in Home spouse;child(daron), adult   Current Living Arrangements house   Home Accessibility no concerns   Transportation Anticipated family or friend will provide   Living Environment Comments Tub shower combo   Self-Care   Usual Activity Tolerance moderate   Current Activity Tolerance fair   Equipment Currently Used at Home cane, straight   Fall history within last six months no   Activity/Exercise/Self-Care Comment Pt IND w/ ADLs and IADLs at baseline   Instrumental Activities of Daily Living (IADL)   Previous Responsibilities meal prep;laundry;housekeeping   General Information   Onset of Illness/Injury or Date of Surgery 12/19/24   Referring Physician Demarcus Verma   Patient/Family Therapy Goal Statement (OT) go home   Additional Occupational Profile Info/Pertinent History of Current Problem Right L4-L5, L5-S1 fusion for severe degenerative disc disease, foraminal stenosis  Continue PT OT, pain control, DVT prophylaxis per spine surgery  Monitor hemoglobin  Has drain in place     History of CAD status post stents  Last stent was 2 years ago per family  Resume aspirin, Plavix when spine surgery agrees     Diabetes mellitus type 2  Continue reduced dose of home Lantus, prandial insulin  Insulin sliding scale as needed  Holding semaglutide     Hypertension  Continue home losartan with holding parameters     Iron deficiency anemia  Monitor hemoglobin  On no home iron supplements     CKD stage III  Avoid nephrotoxic medications  Preop creatinine is 1.1   Existing Precautions/Restrictions spinal;other (see comments)  (Brace for comfort only)   Cognitive Status Examination   Orientation Status orientation to person, place and time   Affect/Mental Status  (Cognitive) WNL   Visual Perception   Visual Impairment/Limitations WFL   Sensory   Sensory Quick Adds sensation intact   Pain Assessment   Patient Currently in Pain Yes, see Vital Sign flowsheet   Posture   Posture not impaired   Range of Motion Comprehensive   General Range of Motion no range of motion deficits identified   Strength Comprehensive (MMT)   General Manual Muscle Testing (MMT) Assessment no strength deficits identified   Bed Mobility   Bed Mobility scooting/bridging;supine-sit;sit-supine   Comment (Bed Mobility) SBA-CGA   Transfers   Transfers bed-chair transfer;sit-stand transfer;toilet transfer;shower transfer   Transfer Comments SBA-CGA   Activities of Daily Living   BADL Assessment/Intervention bathing;lower body dressing;toileting   Bathing Assessment/Intervention   [place] Level (Bathing) minimum assist (75% patient effort)   Lower Body Dressing Assessment/Training   [place] Level (Lower Body Dressing) minimum assist (75% patient effort)   Toileting   [place] Level (Toileting) adjust/manage clothing;toileting skills;supervision   Clinical Impression   Criteria for Skilled Therapeutic Interventions Met (OT) Yes, treatment indicated   OT Diagnosis decreased ADLs   Influenced by the following impairments lumbar fusion   OT Problem List-Impairments impacting ADL activity tolerance impaired;mobility;pain;post-surgical precautions   Assessment of Occupational Performance 3-5 Performance Deficits   Identified Performance Deficits dressing, bed mobility, all transfers, bathing, toileting   Planned Therapy Interventions (OT) ADL retraining;bed mobility training;transfer training   Clinical Decision Making Complexity (OT) detailed assessment/moderate complexity   Risk & Benefits of therapy have been explained evaluation/treatment results reviewed;patient   OT Total Evaluation Time   OT Eval, Moderate Complexity Minutes (94114) 10   Therapy Certification   Medical Diagnosis lumbar fusion    Start of Care Date 12/20/24   Certification date from 12/20/24   Certification date to 12/20/24   [facility] Authorization Information   Condition type Initial onset (within last 3 months)   Cause of current episode Post Surgical   Nature of treatment Rehabilitative   Functional ability Moderate functional limitations   Documented POC (choose all that apply) Measurable short and long term/discharge treatment goals related to physical and functional deficits.;Frequency of treatment visits and treatment activities to address deficit areas.;Patient agrees to program participation including home program   Briefly describe symptoms pain, immobility due to spinal surgery   Symptom impact on ADLs Moderately   Condition change since eval N/A (first visit)   General health reported by patient Fair   Type of surgery 4-Spinal Fusion   OT Goals   Therapy Frequency (OT) One time eval and treatment   OT Predicted Duration/Target Date for Goal Attainment 12/20/24   OT Goals Lower Body Dressing;Bed Mobility;Toilet Transfer/Toileting   OT: Lower Body Dressing Supervision/stand-by assist;using adaptive equipment;within precautions;Goal Met   OT: Bed Mobility Supervision/stand-by assist;supine to/from sitting;within precautions;Goal Met   OT: Toilet Transfer/Toileting Supervision/stand-by assist;toilet transfer;within precautions;using adaptive equipment;Goal Met   Interventions   Interventions Quick Adds Self-Care/Home Management   Self-Care/Home Management   Self-Care/Home Mgmt/ADL, Compensatory, Meal Prep Minutes (60379) 50   Symptoms Noted During/After Treatment (Meal Preparation/Planning Training) increased pain   Treatment Detail/Skilled Intervention Pt/ () edu on spinal px - pt verbalized understanding. Pt edu on log roll technique for bed mobility - completed SBA. Pt instructed on compensatory strategies for LE dressing - completed SET-UP while seated EOB using AE. Pt STS Mod I w/ FWW and amb. 15 ftx2 to bathroom.  Pt edu on safety technique for tub shower transfer - pt will use shower chair at home. Pt edu on safe RTS transfer - completed  Mod I. Edu on compensatory strategies for g/h tasks while standing at sink/sleeping position/car transfers - pt veralized understanding. Edu handout given on ADLs w/in spinal px. Pt ended session in recliner. Extended time required due to language barrier/wanting to assure understanding.   OT Discharge Planning   OT Plan DC OT   OT Discharge Recommendation (DC Rec) home with assist   OT Rationale for DC Rec Pt's family able to assist her if/when needed and will be with her at all times; she is able to complete basic self-care and mobility.   OT Brief overview of current status SBA mobility and self-care   Total Session Time   Timed Code Treatment Minutes 50   Total Session Time (sum of timed and untimed services) 60   [facility]                                                                                   OUTPATIENT OCCUPATIONAL THERAPY    PLAN OF TREATMENT FOR OUTPATIENT REHABILITATION   Patient's Last Name, First Name, Breana Martinez    YOB: 1960   Provider's Name   [facility]   Medical Record No.  [id number]     Onset Date: 12/19/24 Start of Care Date: 12/20/24     Medical Diagnosis:  lumbar fusion               OT Diagnosis:  decreased ADLs Certification Dates:  From: 12/20/24  To: 12/20/24     See note for plan of treatment, functional goals, and certification details.    I CERTIFY THE NEED FOR THESE SERVICES FURNISHED UNDER        THIS PLAN OF TREATMENT AND WHILE UNDER MY CARE (Physician co-signature of this document indicates review and certification of the therapy plan).

## 2024-12-20 NOTE — DISCHARGE INSTRUCTIONS
Home care services have been arrange for you    Home Care Agency: [facility].    Home Care Phone: [phone]    Services: PT    Instructions: Home care will visit within 48 hrs after discharge.  Provider will call you to schedule visit.    Your meds are being sent to your pharmacy, please ask pharmacist any additional questions.

## 2024-12-20 NOTE — PLAN OF CARE
After Visit Summary   1/8/2018    Angi Ramos    MRN: [id number]           Patient Information     Date Of Birth          1958        Visit Information        Provider Department      1/8/2018 4:15 PM Fredy Santana APRN [facility]        Today's Diagnoses     Dysuria    -  1    Urinary tract infection without hematuria, site unspecified          Care Instructions        Understanding Urinary Tract Infections (UTIs)  Most UTIs are caused by bacteria, although they may also be caused by viruses or fungi. Bacteria from the bowel are the most common source of infection. The infection may start because of any of the following:    Sexual activity. During sex, bacteria can travel from the penis, vagina, or rectum into the urethra.     Bacteria on the skin outside the rectum may travel into the urethra. This is more common in women since the rectum and urethra are closer to each other than in men. Wiping from front to back after using the toilet and keeping the area clean can help prevent germs from getting to the urethra.    Blockage of urine flow through the urinary tract. If urine sits too long, germs may start to grow out of control.      Parts of the urinary tract  The infection can occur in any part of the urinary tract.    The kidneys collect and store urine.    The ureters carry urine from the kidneys to the bladder.    The bladder holds urine until you are ready to let it out.    The urethra carries urine from the bladder out of the body. It is shorter in women, so bacteria can move through it more easily. The urethra is longer in men, so a UTI is less likely to reach the bladder or kidneys in men.  Date Last Reviewed: 1/1/2017 2000-2017 [employer]. [address] [place] [address], [place] [address]. All rights reserved. This information is not intended as a substitute for professional medical care. Always follow your healthcare professional's  Goal Outcome Evaluation:      Plan of Care Reviewed With: patient, child, family          Outcome Evaluation: Anticipate DC home with HC PT services.       "instructions.                Follow-ups after your visit        Your next 10 appointments already scheduled     2018  4:15 PM CST   [facility] with SKYLER Martinez CNP   [facility]    [address] [facility] [address] [phone]              Who to contact     If you have questions or need follow up information about today's clinic visit or your schedule please contact [facility] directly at [phone].  Normal or non-critical lab and imaging results will be communicated to you by MyChart, letter or phone within 4 business days after the clinic has received the results. If you do not hear from us within 7 days, please contact the clinic through fishfishmehart or phone. If you have a critical or abnormal lab result, we will notify you by phone as soon as possible.  Submit refill requests through Bit9 or call your pharmacy and they will forward the refill request to us. Please allow 3 business days for your refill to be completed.          Additional Information About Your Visit        MyChart Information     Bit9 lets you send messages to your doctor, view your test results, renew your prescriptions, schedule appointments and more. To sign up, go to [email or web address] . Click on \"Log in\" on the left side of the screen, which will take you to the Welcome page. Then click on \"Sign up Now\" on the right side of the page.     You will be asked to enter the access code listed below, as well as some personal information. Please follow the directions to create your username and password.     Your access code is: [email or web address]  Expires: 2018 11:24 AM     Your access code will  in 90 days. If you need help or a new code, please call your [facility] or [phone].        Care EveryWhere ID     This is your Care EveryWhere ID. This could be used by other organizations to access your [place] medical records  [id number]        Your " "Vitals Were     Pulse Temperature Height Last Period Pulse Oximetry Breastfeeding?    58 97.3  F (36.3  C) (Oral) 5' 6\" (1.676 m) (Approximate) 98% No    BMI (Body Mass Index)                   20.14 kg/m2            Blood Pressure from Last 3 Encounters:   01/08/18 135/77   02/10/17 130/79   10/20/16 104/65    Weight from Last 3 Encounters:   01/08/18 124 lb 12.8 oz (56.6 kg)   02/10/17 125 lb (56.7 kg)   10/20/16 130 lb (59 kg)              We Performed the Following     *UA reflex to Microscopic and Culture ([place] and [facility] (except [place] and [place])     Urine Culture Aerobic Bacterial     Urine Microscopic          Today's Medication Changes          These changes are accurate as of: 1/8/18  4:06 PM.  If you have any questions, ask your nurse or doctor.               Start taking these medicines.        Dose/Directions    sulfamethoxazole-trimethoprim 800-160 MG per tablet   Commonly known as:  BACTRIM DS/SEPTRA DS   Used for:  Urinary tract infection without hematuria, site unspecified   Started by:  Fredy Santana APRN CNP        Dose:  1 tablet   Take 1 tablet by mouth 2 times daily for 3 days   Quantity:  6 tablet   Refills:  0            Where to get your medicines      These medications were sent to [facility]/pharmacy #[id number] - [address] [address]     Phone:  [phone]     sulfamethoxazole-trimethoprim 800-160 MG per tablet                Primary Care Provider Office Phone # Fax #    Dev Sam Joyner [phone] [address] [facility] [address]  [facility] [address]        Equal Access to Services     [facility]: Hadii addison Cifuentes, wabraxtonda lureagan, qaybta kaalmaalex moon, stephanie person. So [facility] [phone].    ATENCIÓN: Si habla español, tiene a avila disposición servicios gratuitos de asistencia lingüística. Llame al [phone].    We comply with " applicable federal civil rights laws and [place] laws. We do not discriminate on the basis of race, color, national origin, age, disability, sex, sexual orientation, or gender identity.            Thank you!     Thank you for choosing [facility]  for your care. Our goal is always to provide you with excellent care. Hearing back from our patients is one way we can continue to improve our services. Please take a few minutes to complete the written survey that you may receive in the mail after your visit with us. Thank you!             Your Updated Medication List - Protect others around you: Learn how to safely use, store and throw away your medicines at www.disposemymeds.org.          This list is accurate as of: 1/8/18  4:06 PM.  Always use your most recent med list.                   Brand Name Dispense Instructions for use Diagnosis    sulfamethoxazole-trimethoprim 800-160 MG per tablet    BACTRIM DS/SEPTRA DS    6 tablet    Take 1 tablet by mouth 2 times daily for 3 days    Urinary tract infection without hematuria, site unspecified

## 2024-12-20 NOTE — CONSULTS
Care Management Initial Consult    General Information  Assessment completed with: Patient, Children, Family,    Type of CM/SW Visit: Initial Assessment    Primary Care Provider verified and updated as needed: Yes   Reason for Consult: discharge planning       Communication Assessment  Patient's communication style: spoken language (non-English)    Hearing Difficulty or Deaf: yes   Wear Glasses or Blind: no    Cognitive  Cognitive/Neuro/Behavioral: WDL  Level of Consciousness: alert  Arousal Level: arouses to voice  Orientation: oriented x 4  Mood/Behavior: calm  Best Language: 0 - No aphasia  Speech: clear    Living Environment:   People in home: child(daron), adult, spouse   & children  Current living Arrangements: house      Able to return to prior arrangements: yes       Family/Social Support:  Care provided by: self, child(daron)  Provides care for: no one  Marital Status:   Support system: Children,           Description of Support System: Supportive, Involved         Current Resources:   Patient receiving home care services: No        Community Resources: None  Equipment currently used at home: cane, straight  Supplies currently used at home: None    Employment/Financial:  Employment Status: retired        Financial Concerns:     Referral to Financial Worker: No       Does the patient's insurance plan have a 3 day qualifying hospital stay waiver?  No    Lifestyle & Psychosocial Needs:  Social Drivers of Health     Food Insecurity: No Food Insecurity (1/18/2024)    Received from [facility]    Food Insecurity     Do you worry your food will run out before you are able to buy more?: 1   Depression: Not at risk (5/23/2024)    Received from [facility]    PHQ-2     PHQ-2 TOTAL SCORE: 2   Housing Stability: Low Risk  (1/18/2024)    Received from [facility] [facility]    Housing Stability     What is your  housing situation today?: 1   Tobacco Use: Low Risk  (12/19/2024)    Patient History     Smoking Tobacco Use: Never     Smokeless Tobacco Use: Never     Passive Exposure: Not on file   Financial Resource Strain: Low Risk  (1/18/2024)    Received from [facility]    Financial Resource Strain     Difficulty of Paying Living Expenses: 3     Difficulty of Paying Living Expenses: Not on file   Alcohol Use: Not At Risk (3/28/2021)    AUDIT-C     Frequency of Alcohol Consumption: Never     Average Number of Drinks: Not on file     Frequency of Binge Drinking: Not on file   Transportation Needs: No Transportation Needs (1/18/2024)    Received from [facility]    Transportation Needs     Does lack of transportation keep you from medical appointments?: 1     Does lack of transportation keep you from work, meetings or getting things that you need?: 1   Physical Activity: Not on file   Interpersonal Safety: Low Risk  (12/19/2024)    Interpersonal Safety     Do you feel physically and emotionally safe where you currently live?: Yes     Within the past 12 months, have you been hit, slapped, kicked or otherwise physically hurt by someone?: No     Within the past 12 months, have you been humiliated or emotionally abused in other ways by your partner or ex-partner?: No   Stress: Not on file   Social Connections: Socially Integrated (1/18/2024)    Received from [facility]    Social Connections     Do you often feel lonely or isolated from those around you?: 0   Health Literacy: Not on file       Functional Status:  Prior to admission patient needed assistance:   Dependent ADLs:: Ambulation-cane, Bathing, Dressing, Grooming  Dependent IADLs:: Cleaning, Cooking, Laundry, Shopping, Meal Preparation, Medication Management, Money Management, Transportation       Mental Health Status:  Mental Health Status: No Current Concerns       Chemical  Dependency Status:  Chemical Dependency Status: No Current Concerns               Additional Information:  2:35 PM  Assessment: Follow   Last Note:12/20/24  Plan: Assessed.  Lives in a house with  and adult son.  Uses a cane.  Gets help from family with everything.  Needs: home care - referral sent to [facility] for PT - waiting on response  Hand off sent: No  Transport: Family    SW discussed home care recommendation.  Pt/family in agreement to HC and did not have an agency preference.  SW sent referral to [facility].  Waiting on response back.    2:58 PM  [facility] accepted for PT.    BENSON Villarreal

## 2024-12-20 NOTE — PROGRESS NOTES
Occupational Therapy Discharge Summary    Reason for therapy discharge:    All goals and outcomes met, no further needs identified.    Progress towards therapy goal(s). See goals on Care Plan in [facility] electronic health record for goal details.  Goals met    Therapy recommendation(s):    No further therapy is recommended.

## 2024-12-20 NOTE — PLAN OF CARE
Problem: Spinal Surgery  Goal: Optimal Pain Control and Function  Intervention: Prevent or Manage Pain  Recent Flowsheet Documentation  Taken 12/19/2024 1749 by Bouchra Perez, RN  Pain Management Interventions: cold applied  Taken 12/19/2024 1600 by Bouchra Perez, RN  Pain Management Interventions: cold applied   Goal Outcome Evaluation:       Patient vital signs are at baseline: Yes, VSS, but elevated bps. Sched Bp med and prn pain med given. Cont to monitor  Patient able to ambulate as they were prior to admission or with assist devices provided by therapies during their stay:  No,  Reason:  Pt has not been oob this shift.   Patient MUST void prior to discharge:  No,  Reason:  De in place. Patent.  Patient able to tolerate oral intake:  Yes  Pain has adequate pain control using Oral analgesics:  Yes  Does patient have an identified :  Yes  Has goal D/C date and time been discussed with patient:  Yes, pending medical clearance and therapies.

## 2024-12-20 NOTE — PLAN OF CARE
Problem: Spinal Surgery  Goal: Optimal Coping with Surgery  Outcome: Progressing   Goal Outcome Evaluation:    Patient vital signs are at baseline: pt's had elevated BPS since being in PACU. Sent a request for PRN BP.   Patient able to ambulate as they were prior to admission or with assist devices provided by therapies during their stay:  No,  Reason:  Pt had been unable to keep her eyes open and responsive to voice.   Patient MUST void prior to discharge:  No,  Reason:  De in place   Patient able to tolerate oral intake:  clear diet  Pain has adequate pain control using Oral analgesics:  {Yes/No, Reason Required:343137}  Does patient have an identified :  Yes  Has goal D/C date and time been discussed with patient:  pending on passing surgical milestones and medical clearance.

## 2024-12-20 NOTE — PROGRESS NOTES
12/20/24 1040   Appointment Info   Signing Clinician's Name / Credentials (PT) Georgia Haque DPT   Rehab Comments (PT) Spine precautions, brace for comfort, WBAT   Living Environment   People in Home spouse;child(daron), adult   Current Living Arrangements house   Home Accessibility no concerns   Transportation Anticipated family or friend will provide   Living Environment Comments 24/7 assist   Self-Care   Usual Activity Tolerance good   Current Activity Tolerance moderate   Equipment Currently Used at Home cane, straight   Fall history within last six months no   Activity/Exercise/Self-Care Comment Pt IND w/ ADLs and IADLs at baseline   General Information   Onset of Illness/Injury or Date of Surgery 12/19/24   Referring Physician Demarcus Verma   Patient/Family Therapy Goals Statement (PT) Return home today   Pertinent History of Current Problem (include personal factors and/or comorbidities that impact the POC) Per chart review, RIGHT LUMBAR 4-5 AND LUMBAR 5 - SACRAL 1 TRANSFORAMINAL LUMBAR INTERBODY FUSION WITH  BILATERAL LUMBAR 4-5 LAMINECTOMY USING STEALTH NAVIGATION. PMH: debility, lactic acidemia   Existing Precautions/Restrictions spinal   Cognition   Affect/Mental Status (Cognition) WNL   Cognitive Status Comments conversation normal per spouse   Pain Assessment   Patient Currently in Pain   (resting 8, remained 8 with mobility)   Range of Motion (ROM)   ROM Comment B LE WFL   Strength (Manual Muscle Testing)   Strength Comments R>L LE weakness   Bed Mobility   Bed Mobility supine-sit-supine   Supine-Sit-Supine [place] (Bed Mobility) minimum assist (75% patient effort);nonverbal cues (demo/gesture)   Assistive Device (Bed Mobility) bed rails   Transfers   Transfers sit-stand transfer;bed-chair transfer   Bed-Chair Transfer   Assistive Device (Bed-Chair Transfers) walker, front-wheeled   Bed-Chair [place] (Transfers) minimum assist (75% patient effort)   Sit-Stand Transfer    Sit-Stand [place] (Transfers) minimum assist (75% patient effort)   Assistive Device (Sit-Stand Transfers) walker, front-wheeled   Gait/Stairs (Locomotion)   [place] Level (Gait) contact guard   Assistive Device (Gait) walker, front-wheeled   Distance in Feet (Gait) 15   Clinical Impression   Criteria for Skilled Therapeutic Intervention Yes, treatment indicated   PT Diagnosis (PT) impaired functional mobility   Influenced by the following impairments pain, weakness   Functional limitations due to impairments impaired bed mobility, transfers, gait, stairs   Clinical Presentation (PT Evaluation Complexity) stable   Clinical Presentation Rationale clinical judgement   Clinical Decision Making (Complexity) low complexity   Planned Therapy Interventions (PT) bed mobility training;gait training;home exercise program;patient/family education;ROM (range of motion);stair training;strengthening;transfer training   Risk & Benefits of therapy have been explained evaluation/treatment results reviewed;care plan/treatment goals reviewed;patient;spouse/significant other   Clinical Impression Comments Pt up in chair prior to session, spouse present to interpret. Subjective obtained. Vitals taken. Discussed spine precautions and brace for comfort. Stood, 2WW. Amb in room and progressed to hallway for endurance. Performed stair trial. Amb back to room, transferred to chair. Stood, transferred to EOB. Performed bed mobility, bed ex. Pt transferred back to chair. Left to rest with feet down for comfort, call light given, belongings in reach, spouse present. Discussed POC and HEP, home recommendations.   PT Total Evaluation Time   PT Radha, Low Complexity Minutes (70672) 10   Physical Therapy Goals   PT Frequency Daily   PT Predicted Duration/Target Date for Goal Attainment 12/25/24   PT Goals Bed Mobility;Transfers;Gait;Stairs;PT Goal 1   PT: Bed Mobility Modified independent;Within precautions   PT: Transfers Modified  independent;Assistive device;Within precautions   PT: Gait Modified independent;Assistive device;150 feet;Within precautions   PT: Stairs Supervision/stand-by assist;4 stairs;Rail on both sides;Within precautions   PT: Goal 1 Pt to be mod I with HEP   Interventions   Interventions Quick Adds Therapeutic Procedure;Therapeutic Activity;Gait Training   Therapeutic Procedure/Exercise   Treatment Detail/Skilled Intervention Supine: AP, QS, GS x 10 reps   Therapeutic Activity   Therapeutic Activities: dynamic activities to improve functional performance Minutes (39874) 8   Treatment Detail/Skilled Intervention Skilled v/c and tactile cues for sit<>stand with 2WW technique, sit<>supine with log roll; all min A   Gait Training   Gait Training Minutes (52502) 10   Treatment Detail/Skilled Intervention Amb 135 ft, 2WW, CGA, slow pace. Attempted stairs; Pt able to ascend one step but with heavy reliance on UEs. Cued for precautions. Pt with R>L weakness and came back down stairs.   PT Discharge Planning   PT Plan Progress bed mobility, transfers, gait distance and safety, stairs   PT Discharge Recommendation (DC Rec) home with assist;home with home care physical therapy   PT Rationale for DC Rec Pt is below baseline level of function. Pt has 24/7 assist and spouse felt able to assist at current level of function if she were to d/c home today. If pt remains in hospital, will continue to progress. Ordered 2WW for home,  to get gait belt at hospital store. Recommed home PT   PT Brief overview of current status Min A 2WW   PT Equipment Needed at Discharge walker, rolling  (deyanira; ordered 12/20)   Physical Therapy Time and Intention   Timed Code Treatment Minutes 18   Total Session Time (sum of timed and untimed services) 28

## 2024-12-20 NOTE — PROGRESS NOTES
"Orthopedic Progress Note      Assessment: 1 Day Post-Op  S/P Procedure(s):  RIGHT LUMBAR 4-5 AND LUMBAR 5 - SACRAL 1 TRANSFORAMINAL LUMBAR INTERBODY FUSION WITH  BILATERAL LUMBAR 4-5 LAMINECTOMY USING STEALTH NAVIGATION @    Plan:   - Continue PT/OT  - Weightbearing status: WBAT can use brace for comfort  - No bending, twisting or lifting more than 10 pounds for 6 weeks.  - Remove read today  - Drain can be removed when output is <30cc for 2 consecutive shifts or POD3  - Anticoagulation: no chemical prophylaxis in addition to SCDs, tulio stockings and early ambulation.  - Antibiotics: 2 grams of Ancef IV q 8 hours until drain is removed   - Orthosis consult placed for LSO brace  - Discharge planning: pending drain output, BM, pain control and progression in therapy       Subjective:  Pain: mild  Chest pain, SOB: no  Nausea, Vomiting:  no  Lightheadedness, Dizziness:  no  Neuro:  Patient denies new onset numbness or paresthesias    Patient is doing well this morning notes that her pain is well controlled. Is ambulating, tolerating oral intake and passing gas. Read still in place at this time. No new concerns    Objective:  BP (!) 146/74 (BP Location: Right arm)   Pulse 89   Temp 97.7  F (36.5  C) (Oral)   Resp 20   Ht 1.499 m (4' 11\")   Wt 58.1 kg (128 lb)   SpO2 97%   BMI 25.85 kg/m    The patient is A&Ox3. Appears comfortable.   Sensation is intact.  Dorsiflexion and plantar flexion is intact.  Dorsalis pedis pulse intact.  Calves are soft and non-tender. Negative Lidia's.  The incision is covered. Dressing C/D/I.  Drain with 10 out overnight    Pertinent Labs   Lab Results: personally reviewed.   No results found for: \"INR\", \"PROTIME\"  Lab Results   Component Value Date    WBC 8.6 04/07/2021    HGB 9.8 (L) 12/20/2024    HCT 26.7 (L) 04/07/2021    MCV 98 04/07/2021     04/07/2021     Lab Results   Component Value Date     04/07/2021    CO2 24 04/07/2021         Report completed by:  Jaz CABRERA" THOMAS Collazo/Dr. Verma  [facility]    Date: 12/20/2024  Time: 10:04 AM

## 2024-12-21 ENCOUNTER — APPOINTMENT (OUTPATIENT)
Dept: PHYSICAL THERAPY | Facility: CLINIC | Age: 64
DRG: 427 | End: 2024-12-21
Attending: ORTHOPAEDIC SURGERY
Payer: COMMERCIAL

## 2024-12-21 LAB
ANION GAP SERPL CALCULATED.3IONS-SCNC: 12 MMOL/L (ref 7–15)
BUN SERPL-MCNC: 17.5 MG/DL (ref 8–23)
CALCIUM SERPL-MCNC: 8.9 MG/DL (ref 8.8–10.4)
CHLORIDE SERPL-SCNC: 107 MMOL/L (ref 98–107)
CREAT SERPL-MCNC: 0.84 MG/DL (ref 0.51–0.95)
EGFRCR SERPLBLD CKD-EPI 2021: 77 ML/MIN/1.73M2
GLUCOSE BLDC GLUCOMTR-MCNC: 119 MG/DL (ref 70–99)
GLUCOSE BLDC GLUCOMTR-MCNC: 120 MG/DL (ref 70–99)
GLUCOSE BLDC GLUCOMTR-MCNC: 140 MG/DL (ref 70–99)
GLUCOSE BLDC GLUCOMTR-MCNC: 149 MG/DL (ref 70–99)
GLUCOSE BLDC GLUCOMTR-MCNC: 220 MG/DL (ref 70–99)
GLUCOSE SERPL-MCNC: 127 MG/DL (ref 70–99)
HCO3 SERPL-SCNC: 22 MMOL/L (ref 22–29)
HGB BLD-MCNC: 10.3 G/DL (ref 11.7–15.7)
HOLD SPECIMEN: NORMAL
POTASSIUM SERPL-SCNC: 3.2 MMOL/L (ref 3.4–5.3)
SODIUM SERPL-SCNC: 141 MMOL/L (ref 135–145)

## 2024-12-21 PROCEDURE — 250N000013 HC RX MED GY IP 250 OP 250 PS 637: Performed by: INTERNAL MEDICINE

## 2024-12-21 PROCEDURE — 97116 GAIT TRAINING THERAPY: CPT | Mod: GP

## 2024-12-21 PROCEDURE — 85018 HEMOGLOBIN: CPT

## 2024-12-21 PROCEDURE — 82374 ASSAY BLOOD CARBON DIOXIDE: CPT | Performed by: INTERNAL MEDICINE

## 2024-12-21 PROCEDURE — 97110 THERAPEUTIC EXERCISES: CPT | Mod: GP

## 2024-12-21 PROCEDURE — 99232 SBSQ HOSP IP/OBS MODERATE 35: CPT | Performed by: INTERNAL MEDICINE

## 2024-12-21 PROCEDURE — 250N000011 HC RX IP 250 OP 636

## 2024-12-21 PROCEDURE — 120N000001 HC R&B MED SURG/OB

## 2024-12-21 PROCEDURE — 250N000013 HC RX MED GY IP 250 OP 250 PS 637

## 2024-12-21 PROCEDURE — 80048 BASIC METABOLIC PNL TOTAL CA: CPT | Performed by: INTERNAL MEDICINE

## 2024-12-21 PROCEDURE — 36415 COLL VENOUS BLD VENIPUNCTURE: CPT

## 2024-12-21 PROCEDURE — 250N000012 HC RX MED GY IP 250 OP 636 PS 637: Performed by: PHYSICIAN ASSISTANT

## 2024-12-21 RX ORDER — POTASSIUM CHLORIDE 1500 MG/1
40 TABLET, EXTENDED RELEASE ORAL ONCE
Status: COMPLETED | OUTPATIENT
Start: 2024-12-21 | End: 2024-12-21

## 2024-12-21 RX ORDER — DEXAMETHASONE 4 MG/1
4 TABLET ORAL EVERY 6 HOURS SCHEDULED
Status: COMPLETED | OUTPATIENT
Start: 2024-12-21 | End: 2024-12-22

## 2024-12-21 RX ADMIN — LOTEPREDNOL ETABONATE 1 DROP: 5 SUSPENSION/ DROPS OPHTHALMIC at 08:13

## 2024-12-21 RX ADMIN — GABAPENTIN 100 MG: 100 CAPSULE ORAL at 08:12

## 2024-12-21 RX ADMIN — POTASSIUM CHLORIDE 40 MEQ: 1500 TABLET, EXTENDED RELEASE ORAL at 13:07

## 2024-12-21 RX ADMIN — HYDROMORPHONE HYDROCHLORIDE 0.4 MG: 0.2 INJECTION, SOLUTION INTRAMUSCULAR; INTRAVENOUS; SUBCUTANEOUS at 00:32

## 2024-12-21 RX ADMIN — THERA TABS 1 TABLET: TAB at 08:12

## 2024-12-21 RX ADMIN — GABAPENTIN 100 MG: 100 CAPSULE ORAL at 22:10

## 2024-12-21 RX ADMIN — FERROUS SULFATE TAB 325 MG (65 MG ELEMENTAL FE) 325 MG: 325 (65 FE) TAB at 08:12

## 2024-12-21 RX ADMIN — POLYETHYLENE GLYCOL 3350 17 G: 17 POWDER, FOR SOLUTION ORAL at 08:13

## 2024-12-21 RX ADMIN — TIZANIDINE 4 MG: 4 TABLET ORAL at 08:12

## 2024-12-21 RX ADMIN — CEFAZOLIN 1 G: 1 INJECTION, POWDER, FOR SOLUTION INTRAMUSCULAR; INTRAVENOUS at 00:32

## 2024-12-21 RX ADMIN — PANTOPRAZOLE SODIUM 40 MG: 40 TABLET, DELAYED RELEASE ORAL at 22:10

## 2024-12-21 RX ADMIN — OXYCODONE 5 MG: 5 TABLET ORAL at 13:07

## 2024-12-21 RX ADMIN — DEXAMETHASONE 4 MG: 4 TABLET ORAL at 18:23

## 2024-12-21 RX ADMIN — LOSARTAN POTASSIUM 25 MG: 25 TABLET, FILM COATED ORAL at 08:12

## 2024-12-21 RX ADMIN — ACETAMINOPHEN 975 MG: 325 TABLET, FILM COATED ORAL at 00:32

## 2024-12-21 RX ADMIN — HYDROMORPHONE HYDROCHLORIDE 0.4 MG: 0.2 INJECTION, SOLUTION INTRAMUSCULAR; INTRAVENOUS; SUBCUTANEOUS at 18:20

## 2024-12-21 RX ADMIN — PANTOPRAZOLE SODIUM 40 MG: 40 TABLET, DELAYED RELEASE ORAL at 08:12

## 2024-12-21 RX ADMIN — LOTEPREDNOL ETABONATE 1 DROP: 5 SUSPENSION/ DROPS OPHTHALMIC at 13:07

## 2024-12-21 RX ADMIN — SENNOSIDES AND DOCUSATE SODIUM 1 TABLET: 50; 8.6 TABLET ORAL at 22:10

## 2024-12-21 RX ADMIN — CEFAZOLIN 1 G: 1 INJECTION, POWDER, FOR SOLUTION INTRAMUSCULAR; INTRAVENOUS at 08:12

## 2024-12-21 RX ADMIN — ACETAMINOPHEN 975 MG: 325 TABLET, FILM COATED ORAL at 08:12

## 2024-12-21 RX ADMIN — INSULIN ASPART 2 UNITS: 100 INJECTION, SOLUTION INTRAVENOUS; SUBCUTANEOUS at 14:07

## 2024-12-21 RX ADMIN — DEXAMETHASONE 4 MG: 4 TABLET ORAL at 13:07

## 2024-12-21 RX ADMIN — INSULIN GLARGINE 30 UNITS: 100 INJECTION, SOLUTION SUBCUTANEOUS at 08:12

## 2024-12-21 RX ADMIN — ACETAMINOPHEN 975 MG: 325 TABLET, FILM COATED ORAL at 17:16

## 2024-12-21 RX ADMIN — GABAPENTIN 100 MG: 100 CAPSULE ORAL at 13:07

## 2024-12-21 RX ADMIN — OXYCODONE 10 MG: 5 TABLET ORAL at 06:47

## 2024-12-21 RX ADMIN — SENNOSIDES AND DOCUSATE SODIUM 1 TABLET: 50; 8.6 TABLET ORAL at 08:12

## 2024-12-21 RX ADMIN — OXYCODONE 10 MG: 5 TABLET ORAL at 23:04

## 2024-12-21 RX ADMIN — LORATADINE 10 MG: 10 TABLET ORAL at 08:12

## 2024-12-21 RX ADMIN — TIZANIDINE 4 MG: 4 TABLET ORAL at 17:16

## 2024-12-21 RX ADMIN — INSULIN ASPART 1 UNITS: 100 INJECTION, SOLUTION INTRAVENOUS; SUBCUTANEOUS at 16:45

## 2024-12-21 ASSESSMENT — ACTIVITIES OF DAILY LIVING (ADL)
ADLS_ACUITY_SCORE: 42
ADLS_ACUITY_SCORE: 40
ADLS_ACUITY_SCORE: 42
ADLS_ACUITY_SCORE: 40
ADLS_ACUITY_SCORE: 42
ADLS_ACUITY_SCORE: 40
ADLS_ACUITY_SCORE: 42
ADLS_ACUITY_SCORE: 40
ADLS_ACUITY_SCORE: 42
ADLS_ACUITY_SCORE: 42
ADLS_ACUITY_SCORE: 40
ADLS_ACUITY_SCORE: 42
ADLS_ACUITY_SCORE: 40
ADLS_ACUITY_SCORE: 42
ADLS_ACUITY_SCORE: 42

## 2024-12-21 NOTE — PROGRESS NOTES
"Orthopedic Progress Note      Assessment: 1 Day Post-Op  S/P Procedure(s):  RIGHT LUMBAR 4-5 AND LUMBAR 5 - SACRAL 1 TRANSFORAMINAL LUMBAR INTERBODY FUSION WITH  BILATERAL LUMBAR 4-5 LAMINECTOMY USING STEALTH NAVIGATION @    Plan:   - Continue PT/OT  - Weightbearing status: WBAT can use brace for comfort  - No bending, twisting or lifting more than 10 pounds for 6 weeks.  - Will start decadrom 4mg q6h x 4 doses for radiating pain.  - Drain can be removed today  - Anticoagulation: no chemical prophylaxis in addition to SCDs, tulio stockings and early ambulation.  - Antibiotics: 2 grams of Ancef IV q 8 hours until drain is removed   - Orthosis consult placed for LSO brace  - Discharge planning: pending BM, pain control and progression in therapy. Given her lower extremity pain, possible discharge tomorrow      Subjective:  Pain: mild  Chest pain, SOB: no  Nausea, Vomiting:  no  Lightheadedness, Dizziness:  no  Neuro:  Patient denies new onset numbness or paresthesias    Patient states she does not have any back pain but she is having right lower extremity pain that radiates to her foot.    Objective:  BP (!) 155/72 (BP Location: Right arm, Patient Position: Left side, Cuff Size: Adult Regular)   Pulse 107   Temp 98.8  F (37.1  C) (Oral)   Resp 20   Ht 1.499 m (4' 11\")   Wt 58.1 kg (128 lb)   SpO2 96%   BMI 25.85 kg/m    The patient is A&Ox3. Appears comfortable.   Sensation is intact.  Dorsiflexion and plantar flexion is intact.  Dorsalis pedis pulse intact.  Calves are soft and non-tender. Negative Ldiia's.  The incision is covered. Dressing C/D/I.  Drain with no appreciable output    Pertinent Labs   Lab Results: personally reviewed.   No results found for: \"INR\", \"PROTIME\"  Lab Results   Component Value Date    WBC 8.6 04/07/2021    HGB 9.8 (L) 12/20/2024    HCT 26.7 (L) 04/07/2021    MCV 98 04/07/2021     04/07/2021     Lab Results   Component Value Date     04/07/2021    CO2 24 04/07/2021 "         Report completed by:  Selina Farrar PA-C , THOMAS/Dr. Verma  [facility]    Date: 12/21/2024   6:57 AM

## 2024-12-21 NOTE — PROGRESS NOTES
[facility] Daily Progress Note    Assessment/Plan:  Right L4-L5, L5-S1 fusion for severe degenerative disc disease, foraminal stenosis  Continue PT OT, pain control, DVT prophylaxis per spine surgery  Monitor hemoglobin  Drain removed    Hypokalemia  P.o. potassium ordered.     History of CAD status post stents  Last stent was 2 years ago per family  Resume aspirin, Plavix when spine surgery agrees     Diabetes mellitus type 2  Continue reduced dose of home Lantus, prandial insulin  Insulin sliding scale as needed  Holding semaglutide     Hypertension  Continue home losartan with holding parameters     Acute blood loss anemia  Iron deficiency anemia  Preop hemoglobin 12/5 at 12-9.8-10.3  Monitor hemoglobin  On home iron supplements     CKD stage III  Avoid nephrotoxic medications  Preop creatinine is 1.1     GERD  On omeprazole, Pepcid    Active Problems:    Status post lumbar surgery     LOS: 1 day     Subjective:   He had more pain overnight, did not sleep well.  She is more somnolent this morning after the pain meds.  Son interpreted at bedside.  had a bowel movement today.  Current Facility-Administered Medications   Medication Dose Route Frequency Provider Last Rate Last Admin    acetaminophen (TYLENOL) tablet 975 mg  975 mg Oral Q8H Coty Garay PA-C   975 mg at 12/21/24 0812    ceFAZolin (ANCEF) 1 g vial to attach to  ml bag for ADULT or 50 ml bag for PEDS  1 g Intravenous Q8H Jaz Collazo PA-C   1 g at 12/21/24 0812    dexAMETHasone (DECADRON) tablet 4 mg  4 mg Oral Q6H [facility] Selina Farrar PA-C        ferrous sulfate (FEROSUL) tablet 325 mg  325 mg Oral Daily with breakfast Yesy Diaz MD   325 mg at 12/21/24 0812    gabapentin (NEURONTIN) capsule 100 mg  100 mg Oral TID Coty Garay PA-C   100 mg at 12/21/24 0812    insulin aspart (NovoLOG) injection (RAPID ACTING)  4 Units Subcutaneous BID AC Yesy Diaz MD   4 Units at 12/21/24 0927    insulin aspart (NovoLOG) injection (RAPID ACTING)   1-7 Units Subcutaneous TID AC Yesy Diaz MD   1 Units at 12/20/24 1752    insulin aspart (NovoLOG) injection (RAPID ACTING)  1-5 Units Subcutaneous At Bedtime Yesy Diaz MD        insulin glargine (LANTUS PEN) injection 30 Units  30 Units Subcutaneous QAM Yesy Diaz MD   30 Units at 12/21/24 0812    loratadine (CLARITIN) tablet 10 mg  10 mg Oral Daily Yesy Diaz MD   10 mg at 12/21/24 0812    losartan (COZAAR) tablet 25 mg  25 mg Oral Daily Yesy Diaz MD   25 mg at 12/21/24 0812    loteprednol (LOTEMAX) 0.5 % ophthalmic susp 1 drop  1 drop Both Eyes 4x Daily Yesy Diaz MD   1 drop at 12/21/24 0813    multivitamin, therapeutic (THERA-VIT) tablet 1 tablet  1 tablet Oral Daily Coty Garay PA-C   1 tablet at 12/21/24 0812    pantoprazole (PROTONIX) EC tablet 40 mg  40 mg Oral BID Yesy Diaz MD   40 mg at 12/21/24 0812    polyethylene glycol (MIRALAX) Packet 17 g  17 g Oral Daily Coty Garay PA-C   17 g at 12/21/24 0813    senna-docusate (SENOKOT-S/PERICOLACE) 8.6-50 MG per tablet 1 tablet  1 tablet Oral BID Coty Garay PA-C   1 tablet at 12/21/24 0812       Objective:  Vital signs in last 24 hours:  Temp:  [98.1  F (36.7  C)-98.8  F (37.1  C)] 98.1  F (36.7  C)  Pulse:  [100-108] 108  Resp:  [17-20] 18  BP: (132-155)/(66-78) 152/66  SpO2:  [96 %-98 %] 98 %  Weight:   [unfilled]    Intake/Output last 3 shifts:  I/O last 3 completed shifts:  In: 1200 [P.O.:1200]  Out: 1470 [Urine:1470]  Intake/Output this shift:  I/O this shift:  In: -   Out: 500 [Urine:500]    Review of Systems:   As per subjective, all others negative.    Physical Exam:    General Appearance:  Alert, cooperative, no distress, appears stated age   Head:  Normocephalic, without obvious abnormality, atraumatic   Eyes:  PERRL,    Neck: Supple   Back:   S/p lumbar surgery   Lungs:   Clear to auscultation bilaterally, respirations unlabored   Chest Wall:  No tenderness or deformity   Heart:  Regular rate and rhythm,  S1, S2 normal,no murmur, rub or gallop   Abdomen:   Soft, non tender, non distended, bowel sounds present, no guarding or rigidity   Extremities: No edema   Skin: Skin color, texture, turgor normal, no rashes or lesions   Neurologic: Alert and oriented X 3, Moves all 4 extremities       Lab Results:  Personally Reviewed.   Recent Results (from the past 24 hours)   Glucose by meter    Collection Time: 12/20/24  1:21 PM   Result Value Ref Range    GLUCOSE BY METER POCT 119 (H) 70 - 99 mg/dL   Glucose by meter    Collection Time: 12/20/24  5:32 PM   Result Value Ref Range    GLUCOSE BY METER POCT 168 (H) 70 - 99 mg/dL   Glucose by meter    Collection Time: 12/20/24  9:39 PM   Result Value Ref Range    GLUCOSE BY METER POCT 127 (H) 70 - 99 mg/dL   Glucose by meter    Collection Time: 12/21/24  2:03 AM   Result Value Ref Range    GLUCOSE BY METER POCT 119 (H) 70 - 99 mg/dL   Hemoglobin    Collection Time: 12/21/24  7:43 AM   Result Value Ref Range    Hemoglobin 10.3 (L) 11.7 - 15.7 g/dL   Extra Green Top (Lithium Heparin) Tube    Collection Time: 12/21/24  7:43 AM   Result Value Ref Range    Hold Specimen [facility]    Basic metabolic panel    Collection Time: 12/21/24  7:43 AM   Result Value Ref Range    Sodium 141 135 - 145 mmol/L    Potassium 3.2 (L) 3.4 - 5.3 mmol/L    Chloride 107 98 - 107 mmol/L    Carbon Dioxide (CO2) 22 22 - 29 mmol/L    Anion Gap 12 7 - 15 mmol/L    Urea Nitrogen 17.5 8.0 - 23.0 mg/dL    Creatinine 0.84 0.51 - 0.95 mg/dL    GFR Estimate 77 >60 mL/min/1.73m2    Calcium 8.9 8.8 - 10.4 mg/dL    Glucose 127 (H) 70 - 99 mg/dL   Glucose by meter    Collection Time: 12/21/24  8:33 AM   Result Value Ref Range    GLUCOSE BY METER POCT 120 (H) 70 - 99 mg/dL         Yesy Diaz M.D  [facility] Service  Internal Medicine

## 2024-12-21 NOTE — PROGRESS NOTES
Patient is A&O X4 and pleasant and cooperative. VSS on RA. Pain is managed with scheduled tylenol and PRN tizanidine, & oxycodone, which was effective. Patient ambulates AX1 with gait belt and walker. PIV is clean, dry, intact & saline locked. Tolerating oral intake. 2 PVRs passed during AM shift.  Last BM was this morning. Patient is in bed resting with family at bedside. Call light in reach.     CC Halima, Nursing Student

## 2024-12-21 NOTE — PLAN OF CARE
Problem: Adult Inpatient Plan of Care  Goal: Optimal Comfort and Wellbeing  Outcome: Progressing     Problem: Spinal Surgery  Goal: Optimal Pain Control and Function  Outcome: Progressing  Patient vital signs are at baseline: Yes  Patient able to ambulate as they were prior to admission or with assist devices provided by therapies during their stay:  Yes  Patient MUST void prior to discharge:  Yes  Patient able to tolerate oral intake:  No, taking iv Dilaudid for pain  Pain has adequate pain control using Oral analgesics:  Yes  Does patient have an identified :  Yes  Has goal D/C date and time been discussed with patient:  Yes

## 2024-12-21 NOTE — CARE PLAN
Pts back dressing was saturated 100%. On call notified. order received to change the dressing. Dressing is now changed CDI.

## 2024-12-22 ENCOUNTER — APPOINTMENT (OUTPATIENT)
Dept: PHYSICAL THERAPY | Facility: CLINIC | Age: 64
DRG: 427 | End: 2024-12-22
Attending: ORTHOPAEDIC SURGERY
Payer: COMMERCIAL

## 2024-12-22 VITALS
SYSTOLIC BLOOD PRESSURE: 130 MMHG | HEART RATE: 92 BPM | BODY MASS INDEX: 25.8 KG/M2 | TEMPERATURE: 97.8 F | RESPIRATION RATE: 16 BRPM | HEIGHT: 59 IN | DIASTOLIC BLOOD PRESSURE: 72 MMHG | OXYGEN SATURATION: 94 % | WEIGHT: 128 LBS

## 2024-12-22 LAB
GLUCOSE BLDC GLUCOMTR-MCNC: 184 MG/DL (ref 70–99)
GLUCOSE BLDC GLUCOMTR-MCNC: 217 MG/DL (ref 70–99)
GLUCOSE BLDC GLUCOMTR-MCNC: 306 MG/DL (ref 70–99)
HOLD SPECIMEN: NORMAL
POTASSIUM SERPL-SCNC: 4.2 MMOL/L (ref 3.4–5.3)

## 2024-12-22 PROCEDURE — 250N000012 HC RX MED GY IP 250 OP 636 PS 637: Performed by: PHYSICIAN ASSISTANT

## 2024-12-22 PROCEDURE — 250N000013 HC RX MED GY IP 250 OP 250 PS 637

## 2024-12-22 PROCEDURE — 97116 GAIT TRAINING THERAPY: CPT | Mod: GP

## 2024-12-22 PROCEDURE — 250N000013 HC RX MED GY IP 250 OP 250 PS 637: Performed by: INTERNAL MEDICINE

## 2024-12-22 PROCEDURE — 36415 COLL VENOUS BLD VENIPUNCTURE: CPT | Performed by: INTERNAL MEDICINE

## 2024-12-22 PROCEDURE — 99232 SBSQ HOSP IP/OBS MODERATE 35: CPT | Performed by: INTERNAL MEDICINE

## 2024-12-22 PROCEDURE — 84132 ASSAY OF SERUM POTASSIUM: CPT | Performed by: INTERNAL MEDICINE

## 2024-12-22 RX ORDER — POLYETHYLENE GLYCOL 3350 17 G/17G
1 POWDER, FOR SOLUTION ORAL DAILY
Qty: 30 PACKET | Refills: 0 | Status: SHIPPED | OUTPATIENT
Start: 2024-12-22

## 2024-12-22 RX ADMIN — GABAPENTIN 100 MG: 100 CAPSULE ORAL at 13:33

## 2024-12-22 RX ADMIN — GABAPENTIN 100 MG: 100 CAPSULE ORAL at 08:30

## 2024-12-22 RX ADMIN — LOTEPREDNOL ETABONATE 1 DROP: 5 SUSPENSION/ DROPS OPHTHALMIC at 13:34

## 2024-12-22 RX ADMIN — THERA TABS 1 TABLET: TAB at 08:30

## 2024-12-22 RX ADMIN — ACETAMINOPHEN 975 MG: 325 TABLET, FILM COATED ORAL at 08:29

## 2024-12-22 RX ADMIN — INSULIN ASPART 4 UNITS: 100 INJECTION, SOLUTION INTRAVENOUS; SUBCUTANEOUS at 11:46

## 2024-12-22 RX ADMIN — LOTEPREDNOL ETABONATE 1 DROP: 5 SUSPENSION/ DROPS OPHTHALMIC at 08:37

## 2024-12-22 RX ADMIN — FERROUS SULFATE TAB 325 MG (65 MG ELEMENTAL FE) 325 MG: 325 (65 FE) TAB at 08:29

## 2024-12-22 RX ADMIN — DEXAMETHASONE 4 MG: 4 TABLET ORAL at 00:29

## 2024-12-22 RX ADMIN — DEXAMETHASONE 4 MG: 4 TABLET ORAL at 06:38

## 2024-12-22 RX ADMIN — ACETAMINOPHEN 975 MG: 325 TABLET, FILM COATED ORAL at 01:47

## 2024-12-22 RX ADMIN — OXYCODONE 5 MG: 5 TABLET ORAL at 14:21

## 2024-12-22 RX ADMIN — TIZANIDINE 4 MG: 4 TABLET ORAL at 10:11

## 2024-12-22 RX ADMIN — PANTOPRAZOLE SODIUM 40 MG: 40 TABLET, DELAYED RELEASE ORAL at 08:29

## 2024-12-22 RX ADMIN — POLYETHYLENE GLYCOL 3350 17 G: 17 POWDER, FOR SOLUTION ORAL at 08:30

## 2024-12-22 RX ADMIN — OXYCODONE 10 MG: 5 TABLET ORAL at 06:53

## 2024-12-22 RX ADMIN — INSULIN ASPART 1 UNITS: 100 INJECTION, SOLUTION INTRAVENOUS; SUBCUTANEOUS at 08:38

## 2024-12-22 RX ADMIN — LOSARTAN POTASSIUM 25 MG: 25 TABLET, FILM COATED ORAL at 08:29

## 2024-12-22 RX ADMIN — SENNOSIDES AND DOCUSATE SODIUM 1 TABLET: 50; 8.6 TABLET ORAL at 08:30

## 2024-12-22 RX ADMIN — LORATADINE 10 MG: 10 TABLET ORAL at 08:30

## 2024-12-22 RX ADMIN — INSULIN GLARGINE 30 UNITS: 100 INJECTION, SOLUTION SUBCUTANEOUS at 08:38

## 2024-12-22 ASSESSMENT — ACTIVITIES OF DAILY LIVING (ADL)
ADLS_ACUITY_SCORE: 42

## 2024-12-22 NOTE — PROGRESS NOTES
Care Management Discharge Note    Discharge Date: 12/22/2024       Discharge Disposition: Home, Home Care    Discharge Services: Home Care    Discharge DME: None    Discharge Transportation: family or friend will provide    Private pay costs discussed: Not applicable    Does the patient's insurance plan have a 3 day qualifying hospital stay waiver?  No    PAS Confirmation Code: N/A  Patient/family educated on Medicare website which has current facility and service quality ratings: yes    Education Provided on the Discharge Plan: Yes  Persons Notified of Discharge Plans: Pt and HC   Patient/Family in Agreement with the Plan: yes    Handoff Referral Completed: Yes, RICARDA PCP: Internal handoff referral completed    Additional Information:  Pt discharging home today. [facility] accepted for home PT.  Orders faxed to [facility]  Family to transport.     SERGEI Jacobo

## 2024-12-22 NOTE — DISCHARGE SUMMARY
DISCHARGE     Patient is appropriate for discharge. PIV has been removed. AVS, prescriptions, and post surgical education reviewed. Stoplight tool and [place] contact card given to patient. Patient ambulates SBA with gait belt and walker. All belongings and ice packs sent home with patient. Patient left unit at 2:25 PM via wheelchair with staff transportation to vehicle. Family to transport home.     EDD Varghese, Nursing Student

## 2024-12-22 NOTE — DISCHARGE SUMMARY
"Breana Boudreaux,  1960, MRN [id number]    Admission Date: 2024  Admission Diagnoses: Spinal stenosis, lumbar [M48.061]     Discharge Date:  24    Post-operative Day:  3 Days Post-Op    Reason for Admission: Brief Hospital Course: This 64 year old female underwent the aforementioned procedure with Dr. Verma.  There were no intraoperative complications and the patient was transferred to the recovery room and later the orthopedic unit in stable condition. Once the patient reached the orthopedic floor our orthopedic pain protocol was implemented along with the following:    Anticoagulation Medications: PER DR. VERMA OK TO RESUME ASA AND PLAVIX POD #5    Consultations during admission: Hospitalist service for medical management     Pertinent Results at Discharge:    Hemoglobin   Date/Time Value Ref Range Status   2024 07:43 AM 10.3 (L) 11.7 - 15.7 g/dL Final   2024 06:42 AM 9.8 (L) 11.7 - 15.7 g/dL Final   2021 05:36 AM 8.4 (L) 11.7 - 15.7 g/dL Final   2021 05:36 AM 7.9 (L) 11.7 - 15.7 g/dL Final   2021 05:29 AM 9.2 (L) 11.7 - 15.7 g/dL Final     Platelet Count   Date/Time Value Ref Range Status   2021 05:36  150 - 450 10e9/L Final   2021 05:36  150 - 450 10e9/L Final   2021 05:29  150 - 450 10e9/L Final     /72 (Cuff Size: Adult Regular)   Pulse 92   Temp 97.8  F (36.6  C) (Oral)   Resp 16   Ht 1.499 m (4' 11\")   Wt 58.1 kg (128 lb)   SpO2 94%   BMI 25.85 kg/m      Active Problems:    Status post lumbar surgery      Discharge Information:  Condition at discharge: good. improving  Discharge destination: home    Medications at discharge:      Medication List        Modified      polyethylene glycol 17 g packet  Commonly known as: MIRALAX  17 g, Oral, DAILY  What changed:   when to take this  reasons to take this            Discontinued      diphenhydrAMINE 25 MG capsule  Commonly known as: BENADRYL     Lorcet 5-325 MG " tablet  Generic drug: HYDROcodone-acetaminophen     multivitamin RENAL 1 tablet tablet              Activity: WBAT    Follow-up Care:  Per. Dr. Verma ok to resume plavix and asa on POD #5  The patient will be followed in our office in 2 weeks or sooner should the need arise.  Patient should follow up with their PCP as directed.  Patient was seen by myself on the date of discharge.    Dave Bucio PA-C    Date: 12/22/2024  Time: 12:15 PM

## 2024-12-22 NOTE — PROGRESS NOTES
Physical Therapy Discharge Summary    Reason for therapy discharge:    All goals and outcomes met, no further needs identified.  No further expectations of functional progress.    Progress towards therapy goal(s). See goals on Care Plan in [facility] electronic health record for goal details.  Goals met    Therapy recommendation(s):    Would recommend  PT to continue working on gait/stairs/strengthening as able

## 2024-12-22 NOTE — PLAN OF CARE
Problem: Adult Inpatient Plan of Care  Goal: Absence of Hospital-Acquired Illness or Injury  Intervention: Identify and Manage Fall Risk  Problem: Spinal Surgery  Goal: Absence of Bleeding  Intervention: Monitor and Manage Bleeding   Problem: Adult Inpatient Plan of Care  Goal: Optimal Comfort and Wellbeing  Intervention: Monitor Pain and Promote Comfort   Goal Outcome Evaluation:  Patient vital signs are at baseline: Yes  Patient able to ambulate as they were prior to admission or with assist devices provided by therapies during their stay:  Yes  Patient MUST void prior to discharge:  Yes  Patient able to tolerate oral intake:  Yes  Pain has adequate pain control using Oral analgesics:  Yes  Does patient have an identified :  Yes  Has goal D/C date and time been discussed with patient:  Yes   Patient is alert and oriented X 4. Spouse present in room and he assisted with translation. PRN Oxycodone and scheduled Tylenol administered for pain control. Ambulated to the  bathroom with SBA and voided. IV saline locked. VSS on RA. Bed alarms activated for safety.

## 2024-12-22 NOTE — PROGRESS NOTES
S Daily Progress Note    Assessment/Plan:  Right L4-L5, L5-S1 fusion for severe degenerative disc disease, foraminal stenosis  Continue PT OT, pain control, DVT prophylaxis per spine surgery  Monitor hemoglobin  Drain removed    Hypokalemia  Replaced.     History of CAD status post stents  Last stent was 2 years ago per family  Resume aspirin, Plavix when spine surgery agrees     Diabetes mellitus type 2  Continue home Lantus, prandial insulin  Insulin sliding scale as needed  Resume semaglutide at discharge      Hypertension  Continue home losartan with holding parameters     Acute blood loss anemia  Iron deficiency anemia  Preop hemoglobin 12/5 at 12-9.8-10.3  Monitor hemoglobin  On home iron supplements     CKD stage III  Avoid nephrotoxic medications  Preop creatinine is 1.1     GERD  On omeprazole, Pepcid    Active Problems:    Status post lumbar surgery     LOS: 1 day     Medically Ready for Discharge: Ready Now     Subjective:  She is doing better today.  Pain is controlled.  Has not had a bowel movement yet.  Passing gas.  No shortness of breath, chest pain.   interpreted at bedside.  Current Facility-Administered Medications   Medication Dose Route Frequency Provider Last Rate Last Admin    acetaminophen (TYLENOL) tablet 975 mg  975 mg Oral Q8H Coty Garay PA-C   975 mg at 12/22/24 0829    ferrous sulfate (FEROSUL) tablet 325 mg  325 mg Oral Daily with breakfast Yesy Diaz MD   325 mg at 12/22/24 0829    gabapentin (NEURONTIN) capsule 100 mg  100 mg Oral TID Coty Garay PA-C   100 mg at 12/22/24 0830    insulin aspart (NovoLOG) injection (RAPID ACTING)  4 Units Subcutaneous BID Yesy Freeman MD   4 Units at 12/22/24 0842    insulin aspart (NovoLOG) injection (RAPID ACTING)  1-7 Units Subcutaneous TID Yesy Freeman MD   1 Units at 12/22/24 0838    insulin aspart (NovoLOG) injection (RAPID ACTING)  1-5 Units Subcutaneous At Bedtime Yesy Diaz MD        insulin glargine  (LANTUS PEN) injection 30 Units  30 Units Subcutaneous QAM Yesy Diaz MD   30 Units at 12/22/24 0838    loratadine (CLARITIN) tablet 10 mg  10 mg Oral Daily Yesy Diaz MD   10 mg at 12/22/24 0830    losartan (COZAAR) tablet 25 mg  25 mg Oral Daily Yesy Diaz MD   25 mg at 12/22/24 0829    loteprednol (LOTEMAX) 0.5 % ophthalmic susp 1 drop  1 drop Both Eyes 4x Daily Yesy Diaz MD   1 drop at 12/22/24 0837    multivitamin, therapeutic (THERA-VIT) tablet 1 tablet  1 tablet Oral Daily Coty Garay PA-C   1 tablet at 12/22/24 0830    pantoprazole (PROTONIX) EC tablet 40 mg  40 mg Oral BID Yesy Diaz MD   40 mg at 12/22/24 0829    polyethylene glycol (MIRALAX) Packet 17 g  17 g Oral Daily Coty Garay PA-C   17 g at 12/22/24 0830    senna-docusate (SENOKOT-S/PERICOLACE) 8.6-50 MG per tablet 1 tablet  1 tablet Oral BID Coty Garay PA-C   1 tablet at 12/22/24 0830       Objective:  Vital signs in last 24 hours:  Temp:  [97.8  F (36.6  C)-98.8  F (37.1  C)] 97.8  F (36.6  C)  Pulse:  [] 92  Resp:  [16-18] 16  BP: (112-157)/(60-88) 130/72  SpO2:  [94 %-98 %] 94 %  Weight:   [unfilled]    Intake/Output last 3 shifts:  I/O last 3 completed shifts:  In: 240 [P.O.:240]  Out: 700 [Urine:700]  Intake/Output this shift:  No intake/output data recorded.    Review of Systems:   As per subjective, all others negative.    Physical Exam:    General Appearance:  Alert, cooperative, no distress, appears stated age   Head:  Normocephalic, without obvious abnormality, atraumatic   Eyes:  PERRL,    Neck: Supple   Back:   S/p lumbar surgery   Lungs:   Clear to auscultation bilaterally, respirations unlabored   Chest Wall:  No tenderness or deformity   Heart:  Regular rate and rhythm, S1, S2 normal,no murmur, rub or gallop   Abdomen:   Soft, non tender, non distended, bowel sounds present, no guarding or rigidity   Extremities: No edema   Skin: Skin color, texture, turgor normal, no rashes or lesions    Neurologic: Alert and oriented X 3, Moves all 4 extremities       Lab Results:  Personally Reviewed.   Recent Results (from the past 24 hours)   Glucose by meter    Collection Time: 12/21/24  1:47 PM   Result Value Ref Range    GLUCOSE BY METER POCT 220 (H) 70 - 99 mg/dL   Glucose by meter    Collection Time: 12/21/24  4:32 PM   Result Value Ref Range    GLUCOSE BY METER POCT 140 (H) 70 - 99 mg/dL   Glucose by meter    Collection Time: 12/21/24 10:07 PM   Result Value Ref Range    GLUCOSE BY METER POCT 149 (H) 70 - 99 mg/dL   Glucose by meter    Collection Time: 12/22/24  2:43 AM   Result Value Ref Range    GLUCOSE BY METER POCT 217 (H) 70 - 99 mg/dL   Potassium    Collection Time: 12/22/24  6:13 AM   Result Value Ref Range    Potassium 4.2 3.4 - 5.3 mmol/L   Extra Purple Top Tube    Collection Time: 12/22/24  6:13 AM   Result Value Ref Range    Hold Specimen JIC    Glucose by meter    Collection Time: 12/22/24  8:37 AM   Result Value Ref Range    GLUCOSE BY METER POCT 184 (H) 70 - 99 mg/dL         Yesy Diaz M.D  [facility]  Internal Medicine

## 2024-12-22 NOTE — PLAN OF CARE
Problem: Adult Inpatient Plan of Care  Goal: Optimal Comfort and Wellbeing  Intervention: Monitor Pain and Promote Comfort  Recent Flowsheet Documentation  Taken 12/21/2024 1716 by Marina Osorio, RN  Pain Management Interventions: medication (see MAR)     Pt A&Ox4, family at bedside. CMS intact. Dressing with dried drainage. Voiding adequately, passed PVRs. Up with sba with wallker. Brace on when OOB for comfort. Pain managed with PRN tizanidine and IV dilaudid. IV SL. Discharge with home care pending.

## 2024-12-22 NOTE — PROGRESS NOTES
"  Post-operative Day: 3 Days Post-Op  S/P Procedure(s):  RIGHT LUMBAR 4-5 AND LUMBAR 5 - SACRAL 1 TRANSFORAMINAL LUMBAR INTERBODY FUSION WITH  BILATERAL LUMBAR 4-5 LAMINECTOMY USING STEALTH NAVIGATION      Subjective:  Pain: Mild  Chest pain, SOB:  No  Nausea, vomiting:  No  Lightheadedness, dizziness:  No  Neuro:  Patient denies new onset numbness or paresthesias    Objective:  /72 (Cuff Size: Adult Regular)   Pulse 92   Temp 97.8  F (36.6  C) (Oral)   Resp 16   Ht 1.499 m (4' 11\")   Wt 58.1 kg (128 lb)   SpO2 94%   BMI 25.85 kg/m    Gen: A&O x 3. NAD.. Pleasant. Seated in chair  Wound status: c/d/i  Circulation, motion and sensation: Dorsiflexion/plantarflexion intact and equal bilaterally; distal lower extremity sensation is intact and equal bilaterally   Swelling: mild   Calf tenderness: calves are soft and non-tender bilaterally     Pertinent Labs   Lab Results: personally reviewed.   No results found for: \"INR\", \"PROTIME\"  Lab Results   Component Value Date    WBC 8.6 04/07/2021    HGB 10.3 (L) 12/21/2024    HCT 26.7 (L) 04/07/2021    MCV 98 04/07/2021     04/07/2021     Lab Results   Component Value Date     12/21/2024    CO2 22 12/21/2024     Assessment: 2 Day Post-Op  S/P Procedure(s):  RIGHT LUMBAR 4-5 AND LUMBAR 5 - SACRAL 1 TRANSFORAMINAL LUMBAR INTERBODY FUSION WITH  BILATERAL LUMBAR 4-5 LAMINECTOMY USING STEALTH NAVIGATION @    Plan:   - Continue PT/OT  - Weightbearing status: WBAT can use brace for comfort  - No bending, twisting or lifting more than 10 pounds for 6 weeks.  - Drain can be removed today  - Anticoagulation: no chemical prophylaxis in addition to SCDs, tulio stockings and early ambulation.  - Orthosis consult placed for LSO brace  - Discharge planning: Patient reports feeling much improved. She would like to discharge today. Ok to discharge if medically cleared.     Report completed by:  Dave Bucio PA-C PA-C  Date: 12/22/2024  Time: 11:59 AM    "

## 2025-02-28 ENCOUNTER — DOCUMENTATION ONLY (OUTPATIENT)
Dept: PHYSICAL MEDICINE AND REHAB | Facility: CLINIC | Age: 65
End: 2025-02-28
Payer: COMMERCIAL

## 2025-02-28 DIAGNOSIS — R26.89 IMPAIRED GAIT AND MOBILITY: Primary | ICD-10-CM

## (undated) DEVICE — CUSHION INSERT LG PRONE VIEW JACKSON TABLE

## (undated) DEVICE — DRAPE TOWEL IMPERVIOUS X2 7553

## (undated) DEVICE — DRAPE STERI TOWEL LG 1010

## (undated) DEVICE — DRSG PRIMAPORE 03 1/8X6" 66000318

## (undated) DEVICE — SUTURE VICRYL+ 2-0 CT-2 27" UND VCP269H

## (undated) DEVICE — BLADE BONE MILL STRK MED 5420-MED-000

## (undated) DEVICE — ESU ELEC BLADE 2.75" COATED/INSULATED E1455

## (undated) DEVICE — CATH IV 14GA 2IN REM FLASHPLUG DEHP-FR PVC FR 4251717-02

## (undated) DEVICE — CELL SAVER

## (undated) DEVICE — SU STRATAFIX PDS PLUS 1 CT-1 18" SXPP1A404

## (undated) DEVICE — CATH TRAY FOLEY SURESTEP 16FR DRAIN BAG STATOCK A899916

## (undated) DEVICE — DRAPE BACK TABLE PADDED 60X90

## (undated) DEVICE — ELECTRODE PATIENT RETURN ADULT L10 FT 2 PLATE CORD 0855C

## (undated) DEVICE — GLOVE BIOGEL PI SZ 7.0 40870

## (undated) DEVICE — GLOVE BIOGEL PI SZ 8.0 40880

## (undated) DEVICE — TOOL DISSECT MIDAS MR8 14CM MATCH HEAD 3MM MR8-14MH30

## (undated) DEVICE — POSITIONER ARM CRADLE LAMINECTOMY DISP

## (undated) DEVICE — KIT DRAIN CLOSED WOUND SUCTION MED 400ML RESVR

## (undated) DEVICE — SU DERMABOND ADVANCED .7ML DNX12

## (undated) DEVICE — MARKER SPHERES PASSIVE MEDT PACK 5 8801075

## (undated) DEVICE — ESU PENCIL SMOKE EVAC W/ROCKER SWITCH 0703-047-000

## (undated) DEVICE — GLOVE BIOGEL PI INDICATOR 8.0 LF 41680

## (undated) DEVICE — PACK MINOR SINGLE BASIN SSK3001

## (undated) DEVICE — DRSG BIOPATCH GERMICIDAL SPLIT SPONGE 4MM MED 4150

## (undated) DEVICE — SUTURE VICRYL+ 1 27IN CT-2 VLT VCP335H

## (undated) DEVICE — GOWN XLG DISP 9545

## (undated) DEVICE — RX SURGIFLO HEMOSTATIC MATRIX 8ML 2991

## (undated) DEVICE — SOL WATER IRRIG 1000ML BOTTLE 2F7114

## (undated) DEVICE — DRAPE O ARM TUBE 9732722

## (undated) DEVICE — CUSTOM PACK LUMBAR FUSION SNE5BLFHEA

## (undated) DEVICE — WRAP B-COOL TERI LUMBAR 08143380

## (undated) DEVICE — PACK 9X6IN THRP HOT COLD CMPR  MED GEL 80104

## (undated) DEVICE — SOL NACL 0.9% IRRIG 1000ML BOTTLE 2F7124

## (undated) DEVICE — GLOVE UNDER INDICATOR PI SZ 7.0 LF 41670

## (undated) DEVICE — SYR BULB IRRIG DOVER 60 ML LATEX FREE 67000

## (undated) DEVICE — SUTURE MONOCRYL 3-0 18 PS2 UND MCP497G

## (undated) RX ORDER — ONDANSETRON 2 MG/ML
INJECTION INTRAMUSCULAR; INTRAVENOUS
Status: DISPENSED
Start: 2024-12-19

## (undated) RX ORDER — CEFAZOLIN SODIUM 1 G/3ML
INJECTION, POWDER, FOR SOLUTION INTRAMUSCULAR; INTRAVENOUS
Status: DISPENSED
Start: 2024-12-19

## (undated) RX ORDER — LIDOCAINE HYDROCHLORIDE 10 MG/ML
INJECTION, SOLUTION EPIDURAL; INFILTRATION; INTRACAUDAL; PERINEURAL
Status: DISPENSED
Start: 2024-12-19

## (undated) RX ORDER — FENTANYL CITRATE-0.9 % NACL/PF 10 MCG/ML
PLASTIC BAG, INJECTION (ML) INTRAVENOUS
Status: DISPENSED
Start: 2024-12-19

## (undated) RX ORDER — PROPOFOL 10 MG/ML
INJECTION, EMULSION INTRAVENOUS
Status: DISPENSED
Start: 2024-12-19

## (undated) RX ORDER — TRANEXAMIC ACID 100 MG/ML
INJECTION, SOLUTION INTRAVENOUS
Status: DISPENSED
Start: 2024-12-19

## (undated) RX ORDER — DEXAMETHASONE SODIUM PHOSPHATE 10 MG/ML
INJECTION, EMULSION INTRAMUSCULAR; INTRAVENOUS
Status: DISPENSED
Start: 2024-12-19

## (undated) RX ORDER — FENTANYL CITRATE 50 UG/ML
INJECTION, SOLUTION INTRAMUSCULAR; INTRAVENOUS
Status: DISPENSED
Start: 2024-12-19